# Patient Record
Sex: FEMALE | Race: WHITE | NOT HISPANIC OR LATINO | Employment: OTHER | URBAN - METROPOLITAN AREA
[De-identification: names, ages, dates, MRNs, and addresses within clinical notes are randomized per-mention and may not be internally consistent; named-entity substitution may affect disease eponyms.]

---

## 2017-01-05 ENCOUNTER — OFFICE VISIT (OUTPATIENT)
Dept: FAMILY MEDICINE | Facility: CLINIC | Age: 52
End: 2017-01-05
Payer: COMMERCIAL

## 2017-01-05 VITALS
SYSTOLIC BLOOD PRESSURE: 140 MMHG | HEART RATE: 85 BPM | WEIGHT: 219.56 LBS | DIASTOLIC BLOOD PRESSURE: 93 MMHG | HEIGHT: 66 IN | BODY MASS INDEX: 35.29 KG/M2

## 2017-01-05 DIAGNOSIS — Z85.3 HISTORY OF BREAST CANCER: ICD-10-CM

## 2017-01-05 DIAGNOSIS — M19.90 OSTEOARTHRITIS, UNSPECIFIED OSTEOARTHRITIS TYPE, UNSPECIFIED SITE: ICD-10-CM

## 2017-01-05 DIAGNOSIS — M19.90 ARTHRITIS: ICD-10-CM

## 2017-01-05 DIAGNOSIS — Z76.89 ESTABLISHING CARE WITH NEW DOCTOR, ENCOUNTER FOR: Primary | ICD-10-CM

## 2017-01-05 PROCEDURE — 99999 PR PBB SHADOW E&M-NEW PATIENT-LVL III: CPT | Mod: PBBFAC,,, | Performed by: FAMILY MEDICINE

## 2017-01-05 PROCEDURE — 99203 OFFICE O/P NEW LOW 30 MIN: CPT | Mod: S$GLB,,, | Performed by: FAMILY MEDICINE

## 2017-01-05 PROCEDURE — 1159F MED LIST DOCD IN RCRD: CPT | Mod: S$GLB,,, | Performed by: FAMILY MEDICINE

## 2017-01-05 RX ORDER — TAMOXIFEN CITRATE 20 MG/1
20 TABLET ORAL DAILY
COMMUNITY
End: 2017-08-21

## 2017-01-05 NOTE — PROGRESS NOTES
Subjective:       Patient ID: Gil Cueva is a 51 y.o. female.    Chief Complaint: Establish Care (HX of  DJD (C- 2,3, and 4 removed in 2001))    HPI     Establish Care  Pt reports to the clinic to establish care.   The pt has a medical history which includes history of breast cancer, DJD, arthritis  As far as smoking is concerned, the pt denies.   The pt attempts to maintain a healthy diet and engages in regular exercise.   Consistent seatbelt usage reported.   Pt has no symptoms of depression.   Health maintenance addressed and updated in chart.    Review of Systems   Constitutional: Negative for chills and fever.   HENT: Negative for sore throat.    Eyes: Negative for visual disturbance.   Respiratory: Negative for cough and shortness of breath.    Cardiovascular: Negative for chest pain and leg swelling.   Gastrointestinal: Negative for abdominal pain, blood in stool, constipation, diarrhea and vomiting.   Genitourinary: Negative for difficulty urinating, dysuria and hematuria.   Musculoskeletal: Negative for arthralgias.   Neurological: Negative for dizziness and weakness.       Objective:      Physical Exam   Constitutional: She appears well-developed and well-nourished. No distress.   HENT:   Head: Normocephalic and atraumatic.   Mouth/Throat: Oropharynx is clear and moist. No oropharyngeal exudate.   Eyes: EOM are normal. Pupils are equal, round, and reactive to light.   Neck: Normal range of motion. Neck supple. No thyromegaly present.   Cardiovascular: Normal rate, regular rhythm, normal heart sounds and intact distal pulses.    Pulmonary/Chest: Effort normal and breath sounds normal. No respiratory distress. She has no wheezes.   Abdominal: Soft. Bowel sounds are normal. She exhibits no distension and no mass. There is no tenderness.   Musculoskeletal: She exhibits no edema.   Lymphadenopathy:     She has no cervical adenopathy.   Neurological: She is alert.   Skin: Skin is warm. No rash noted.  No erythema.   Psychiatric: She has a normal mood and affect. Her behavior is normal.   Vitals reviewed.      Assessment:       1. Establishing care with new doctor, encounter for    2. History of breast cancer        Plan:       1. Establishing care with new doctor, encounter for  - Basic metabolic panel; Future  - Lipid panel; Future  - Microalbumin/creatinine urine ratio; Future    2. History of breast cancer  She does continue to follow with oncology for this condition.  She is in remission at this point.     3. Arthritis  Condition currently stable. No changes to medication regimen on today.       Portions of this note were created using Dragon voice recognition software. There may be voice recognition errors found in the text, and attempts were made to correct these errors prior to signature    Darien Ferraro MD    Family Medicine  1/5/2017

## 2017-01-05 NOTE — PATIENT INSTRUCTIONS
Weight Management: Getting Started  Healthy bodies come in all shapes and sizes. Not all bodies are made to be thin. For some people, a healthy weight is higher than the average weight listed on weight charts. Your healthcare provider can help you decide on a healthy weight for you.    Reasons to lose weight  Losing weight can help with some health problems, such as high blood pressure, heart disease, diabetes, sleep apnea, and arthritis. You may also feel more energy.  Set your long-term goal  Your goal doesn't even have to be a specific weight. You may decide on a fitness goal (such as being able to walk 10 miles a week), or a health goal (such as lowering your blood pressure). Choose a goal that is measurable and reasonable, so you know when you've reached it. A goal of reaching a BMI of less than 25 is not always reasonable (or possible).   Make an action plan  Habits dont change overnight. Setting your goals too high can leave you feeling discouraged if you cant reach them. Be realistic. Choose one or two small changes you can make now. Set an action plan for how you are going to make these changes. When you can stick to this plan, keep making a few more small changes. Taking small steps will help you stay on the path to success.  Track your progress  Write down your goals. Then, keep a daily record of your progress. Write down what you eat and how active you are. This record lets you look back on how much youve done. It may also help when youre feeling frustrated. Reward yourself for success. Even if you dont reach every goal, give yourself credit for what you do get done.  Get support  Encouragement from others can help make losing weight easier. Ask your family members and friends for support. They may even want to join you. Also look to your healthcare provider, registered dietitian, and  for help. Your local hospital can give you more information about nutrition, exercise, and  weight loss.  © 1830-6696 The "[employer]. [address] [place] [address], [place] [address]. All rights reserved. This information is not intended as a substitute for professional medical care. Always follow your healthcare professional's instructions.

## 2017-01-05 NOTE — MR AVS SNAPSHOT
[facility]  [address]  [place] [address]  Phone: [phone]  Fax: [phone]                  Gil Cueva   2017 3:20 PM   Office Visit    Description:  Female : 1965   Provider:  Darien Ferraro MD   Department:  [facility]           Reason for Visit     Establish Care           Diagnoses this Visit        Comments    Establishing care with new doctor, encounter for    -  Primary     History of breast cancer         Arthritis         Osteoarthritis, unspecified osteoarthritis type, unspecified site                To Do List           Future Appointments        Provider Department Dept Phone    1/10/2017 9:15 AM LAB, [facility] - Lab [phone]    1/10/2017 9:30 AM SPECIMEN, [facility] - Lab [phone]      Goals (5 Years of Data)     None      Follow-Up and Disposition     Return in about 1 year (around 2018).      [facility] On Call     [facility] On Call Nurse [facility] -  Assistance  Registered nurses in the [facility] On Call Center provide clinical advisement, health education, appointment booking, and other advisory services.  Call for this free service at [phone].             Medications           Message regarding Medications     Verify the changes and/or additions to your medication regime listed below are the same as discussed with your clinician today.  If any of these changes or additions are incorrect, please notify your healthcare provider.             Verify that the below list of medications is an accurate representation of the medications you are currently taking.  If none reported, the list may be blank. If incorrect, please contact your healthcare provider. Carry this list with you in case of emergency.           Current Medications     tamoxifen (NOLVADEX) 20 MG Tab Take 20 mg by mouth once daily.           Clinical Reference Information           Vital Signs - Last Recorded  Most  "recent update: 1/5/2017  2:59 PM by Jerri Carrizales MA    BP Pulse Ht Wt BMI    (!) 140/93 85 5' 6" (1.676 m) 99.6 kg (219 lb 9.3 oz) 35.44 kg/m2      Blood Pressure          Most Recent Value    BP  (!)  140/93      Allergies as of 1/5/2017     Penicillins    Sulfa (Sulfonamide Antibiotics)      Immunizations Administered on Date of Encounter - 1/5/2017     None      Orders Placed During Today's Visit     Future Labs/Procedures Expected by Expires    Basic metabolic panel  1/5/2017 3/6/2018    Lipid panel  1/5/2017 3/6/2018    Microalbumin/creatinine urine ratio  1/5/2017 3/6/2018      [facility] Sign-Up     Activating your [facility] account is as easy as 1-2-3!     1) Visit [email or web address], select Sign Up Now, enter this activation code and your date of birth, then select Next.  [email or web address]  Expires: 2/19/2017  3:29 PM      2) Create a username and password to use when you visit [facility] in the future and select a security question in case you lose your password and select Next.    3) Enter your e-mail address and click Sign Up!    Additional Information  If you have questions, please e-mail [email or web address] or call [phone] to talk to our [facility] staff. Remember, [facility] is NOT to be used for urgent needs. For medical emergencies, dial 911.         Instructions      Weight Management: Getting Started  Healthy bodies come in all shapes and sizes. Not all bodies are made to be thin. For some people, a healthy weight is higher than the average weight listed on weight charts. Your healthcare provider can help you decide on a healthy weight for you.    Reasons to lose weight  Losing weight can help with some health problems, such as high blood pressure, heart disease, diabetes, sleep apnea, and arthritis. You may also feel more energy.  Set your long-term goal  Your goal doesn't even have to be a specific weight. You may decide on a fitness goal (such as being able to walk 10 miles a week), or a " health goal (such as lowering your blood pressure). Choose a goal that is measurable and reasonable, so you know when you've reached it. A goal of reaching a BMI of less than 25 is not always reasonable (or possible).   Make an action plan  Habits dont change overnight. Setting your goals too high can leave you feeling discouraged if you cant reach them. Be realistic. Choose one or two small changes you can make now. Set an action plan for how you are going to make these changes. When you can stick to this plan, keep making a few more small changes. Taking small steps will help you stay on the path to success.  Track your progress  Write down your goals. Then, keep a daily record of your progress. Write down what you eat and how active you are. This record lets you look back on how much youve done. It may also help when youre feeling frustrated. Reward yourself for success. Even if you dont reach every goal, give yourself credit for what you do get done.  Get support  Encouragement from others can help make losing weight easier. Ask your family members and friends for support. They may even want to join you. Also look to your healthcare provider, registered dietitian, and  for help. Your local hospital can give you more information about nutrition, exercise, and weight loss.  © 5008-2114 [employer]. [address] [place] [address], [place] [address]. All rights reserved. This information is not intended as a substitute for professional medical care. Always follow your healthcare professional's instructions.

## 2017-01-09 DIAGNOSIS — Z11.59 NEED FOR HEPATITIS C SCREENING TEST: ICD-10-CM

## 2017-01-10 ENCOUNTER — LAB VISIT (OUTPATIENT)
Dept: LAB | Facility: HOSPITAL | Age: 52
End: 2017-01-10
Attending: FAMILY MEDICINE
Payer: COMMERCIAL

## 2017-01-10 DIAGNOSIS — Z76.89 ESTABLISHING CARE WITH NEW DOCTOR, ENCOUNTER FOR: ICD-10-CM

## 2017-01-10 LAB
ANION GAP SERPL CALC-SCNC: 7 MMOL/L
BUN SERPL-MCNC: 16 MG/DL
CALCIUM SERPL-MCNC: 9.2 MG/DL
CHLORIDE SERPL-SCNC: 102 MMOL/L
CHOLEST/HDLC SERPL: 4.4 {RATIO}
CO2 SERPL-SCNC: 28 MMOL/L
CREAT SERPL-MCNC: 0.7 MG/DL
EST. GFR  (AFRICAN AMERICAN): >60 ML/MIN/1.73 M^2
EST. GFR  (NON AFRICAN AMERICAN): >60 ML/MIN/1.73 M^2
GLUCOSE SERPL-MCNC: 100 MG/DL
HDL/CHOLESTEROL RATIO: 22.5 %
HDLC SERPL-MCNC: 231 MG/DL
HDLC SERPL-MCNC: 52 MG/DL
LDLC SERPL CALC-MCNC: 164 MG/DL
NONHDLC SERPL-MCNC: 179 MG/DL
POTASSIUM SERPL-SCNC: 4.5 MMOL/L
SODIUM SERPL-SCNC: 137 MMOL/L
TRIGL SERPL-MCNC: 75 MG/DL

## 2017-01-10 PROCEDURE — 80061 LIPID PANEL: CPT

## 2017-01-10 PROCEDURE — 80048 BASIC METABOLIC PNL TOTAL CA: CPT

## 2017-01-10 PROCEDURE — 36415 COLL VENOUS BLD VENIPUNCTURE: CPT | Mod: PO

## 2017-01-13 ENCOUNTER — TELEPHONE (OUTPATIENT)
Dept: FAMILY MEDICINE | Facility: CLINIC | Age: 52
End: 2017-01-13

## 2017-01-17 ENCOUNTER — TELEPHONE (OUTPATIENT)
Dept: FAMILY MEDICINE | Facility: CLINIC | Age: 52
End: 2017-01-17

## 2017-05-04 ENCOUNTER — TELEPHONE (OUTPATIENT)
Dept: FAMILY MEDICINE | Facility: CLINIC | Age: 52
End: 2017-05-04

## 2017-05-04 NOTE — TELEPHONE ENCOUNTER
Spoke with patient regarding her call, inform ed her that we could not send pain medication out of state and that she would have to call to schedule an appointment to be seen when she get back in town. Patient expressed understanding.

## 2017-05-04 NOTE — TELEPHONE ENCOUNTER
----- Message from Lora Usama sent at 5/4/2017  7:31 AM CDT -----  [facility] ... She [facility] .. Was taken to urgent care.. Has 2 broken ankles (1 is detached) and a elbow .. Will not prescribe pain meds in [place] ... Only ibuprofen 800 mgs .. Requesting  Pain med to get home .. She cannot stand to go to potty / very upset  .. Has a 9 hour ride home ... Will need a follow up appt on Saturday ... Call pt at  [phone] to discuss / she needs help

## 2017-08-21 ENCOUNTER — OFFICE VISIT (OUTPATIENT)
Dept: HEMATOLOGY/ONCOLOGY | Facility: CLINIC | Age: 52
End: 2017-08-21
Payer: COMMERCIAL

## 2017-08-21 VITALS
TEMPERATURE: 98 F | HEART RATE: 85 BPM | DIASTOLIC BLOOD PRESSURE: 86 MMHG | HEIGHT: 66 IN | SYSTOLIC BLOOD PRESSURE: 133 MMHG | WEIGHT: 220 LBS | RESPIRATION RATE: 18 BRPM | BODY MASS INDEX: 35.36 KG/M2

## 2017-08-21 DIAGNOSIS — C50.112 MALIGNANT NEOPLASM OF CENTRAL PORTION OF LEFT BREAST IN FEMALE, ESTROGEN RECEPTOR POSITIVE: Chronic | ICD-10-CM

## 2017-08-21 DIAGNOSIS — Z17.0 MALIGNANT NEOPLASM OF CENTRAL PORTION OF LEFT BREAST IN FEMALE, ESTROGEN RECEPTOR POSITIVE: Chronic | ICD-10-CM

## 2017-08-21 PROCEDURE — 99213 OFFICE O/P EST LOW 20 MIN: CPT | Mod: ,,, | Performed by: INTERNAL MEDICINE

## 2017-08-21 RX ORDER — HYDROCODONE BITARTRATE AND ACETAMINOPHEN 10; 325 MG/1; MG/1
TABLET ORAL
COMMUNITY
Start: 2017-06-14 | End: 2017-08-21

## 2017-08-21 RX ORDER — TERBINAFINE HYDROCHLORIDE 250 MG/1
TABLET ORAL
COMMUNITY
Start: 2017-07-30 | End: 2017-08-21

## 2017-08-21 RX ORDER — HYDROCODONE BITARTRATE AND ACETAMINOPHEN 5; 325 MG/1; MG/1
TABLET ORAL
COMMUNITY
Start: 2017-07-05 | End: 2017-08-21

## 2017-08-21 NOTE — LETTER
August 21, 2017      Darien Ferraro MD  [address] [facility]  [address]  [place] [address]           [facility]  [address] [facility]  [address]  [place] [address]  Phone: [phone]  Fax: [phone]          Patient: Gil Cueva   MR Number: [id number]   YOB: 1965   Date of Visit: 8/21/2017       Dear Dr. Darien Ferraro:    Thank you for referring Gil Cueva to me for evaluation. Attached you will find relevant portions of my assessment and plan of care.    If you have questions, please do not hesitate to call me. I look forward to following Gil Cueva along with you.    Sincerely,    Jona Pretty MD    Enclosure  CC:  No Recipients    If you would like to receive this communication electronically, please contact [email or web address] or [phone] to request more information on [employer] Link access.    For providers and/or their staff who would like to refer a patient to [facility], please contact us through our one-stop-shop provider referral line, [facility], at [phone].    If you feel you have received this communication in error or would no longer like to receive these types of communications, please e-mail [email or web address]

## 2017-08-21 NOTE — PROGRESS NOTES
"                                                         PROGRESS NOTE    Subjective:       Patient ID: Gil Cueva is a 52 y.o. female.    Dx: 8/8/2011, IDCA  Lump/ALND: 8/22/2011  ER 99%, %, Her2 neg  T1rC5B1, 8mm tumor.   Low risk oncotype:  Began Lennon 10/3/460827/2017    Chief Complaint:  Follow-up and Results (mammo and labs from jan)  f/u breast cancer.     History of Present Illness:   Gil Cueva is a 52 y.o. female who presents routing f/u for breast cancer. No new complaints.        Family and Social history reviewed and is unchanged from 9/16/2011.       ROS:  Review of Systems   Constitutional: Negative for fever.   Respiratory: Negative for shortness of breath.    Cardiovascular: Negative for chest pain and leg swelling.   Gastrointestinal: Negative for abdominal pain and blood in stool.   Genitourinary: Negative for hematuria.   Skin: Negative for rash.        No current outpatient prescriptions on file.        Objective:       Physical Examination:     /86   Pulse 85   Temp 98.2 °F (36.8 °C)   Resp 18   Ht 5' 6" (1.676 m)   Wt 99.8 kg (220 lb)   BMI 35.51 kg/m²     Physical Exam   Constitutional: She appears well-developed and well-nourished.   HENT:   Head: Normocephalic and atraumatic.   Right Ear: External ear normal.   Left Ear: External ear normal.   Mouth/Throat: Oropharynx is clear and moist.   Eyes: Conjunctivae are normal. Pupils are equal, round, and reactive to light.   Neck: No tracheal deviation present. No thyromegaly present.   Cardiovascular: Normal rate, regular rhythm and normal heart sounds.    Pulmonary/Chest: Effort normal and breath sounds normal.       Abdominal: Soft. Bowel sounds are normal. She exhibits no distension and no mass. There is no tenderness.   Musculoskeletal: She exhibits no edema.   Neurological:   Neuro intact througout   Skin: No rash noted.   Psychiatric: She has a normal mood and affect. Her behavior is normal. Judgment and " thought content normal.       Labs:   No results found for this or any previous visit (from the past 336 hour(s)).  CMP  Sodium   Date Value Ref Range Status   01/10/2017 137 136 - 145 mmol/L Final   01/10/2017 138 136 - 145 mmol/L Final     Potassium   Date Value Ref Range Status   01/10/2017 4.5 3.5 - 5.1 mmol/L Final   01/10/2017 4.6 3.5 - 5.1 mmol/L Final     Chloride   Date Value Ref Range Status   01/10/2017 102 95 - 110 mmol/L Final   01/10/2017 102 95 - 110 mmol/L Final     CO2   Date Value Ref Range Status   01/10/2017 28 23 - 29 mmol/L Final   01/10/2017 29 23 - 29 mmol/L Final     Glucose   Date Value Ref Range Status   01/10/2017 100 70 - 110 mg/dL Final   01/10/2017 99 70 - 110 mg/dL Final     BUN, Bld   Date Value Ref Range Status   01/10/2017 16 6 - 20 mg/dL Final   01/10/2017 17 6 - 20 mg/dL Final     Creatinine   Date Value Ref Range Status   01/10/2017 0.7 0.5 - 1.4 mg/dL Final   01/10/2017 0.8 0.5 - 1.4 mg/dL Final     Calcium   Date Value Ref Range Status   01/10/2017 9.2 8.7 - 10.5 mg/dL Final   01/10/2017 9.4 8.7 - 10.5 mg/dL Final     Total Protein   Date Value Ref Range Status   01/10/2017 7.5 6.0 - 8.4 g/dL Final     Albumin   Date Value Ref Range Status   01/10/2017 3.9 3.5 - 5.2 g/dL Final     Total Bilirubin   Date Value Ref Range Status   01/10/2017 0.4 0.1 - 1.0 mg/dL Final     Comment:     For infants and newborns, interpretation of results should be based  on gestational age, weight and in agreement with clinical  observations.  Premature Infant recommended reference ranges:  Up to 24 hours.............<8.0 mg/dL  Up to 48 hours............<12.0 mg/dL  3-5 days..................<15.0 mg/dL  6-29 days.................<15.0 mg/dL       Alkaline Phosphatase   Date Value Ref Range Status   01/10/2017 83 55 - 135 U/L Final     AST   Date Value Ref Range Status   01/10/2017 14 10 - 40 U/L Final     ALT   Date Value Ref Range Status   01/10/2017 17 10 - 44 U/L Final     Anion Gap   Date  Value Ref Range Status   01/10/2017 7 (L) 8 - 16 mmol/L Final   01/10/2017 7 (L) 8 - 16 mmol/L Final     eGFR if    Date Value Ref Range Status   01/10/2017 >60.0 >60 mL/min/1.73 m^2 Final   01/10/2017 >60.0 >60 mL/min/1.73 m^2 Final     eGFR if non    Date Value Ref Range Status   01/10/2017 >60.0 >60 mL/min/1.73 m^2 Final     Comment:     Calculation used to obtain the estimated glomerular filtration  rate (eGFR) is the CKD-EPI equation. Since race is unknown   in our information system, the eGFR values for   -American and Non--American patients are given   for each creatinine result.     01/10/2017 >60.0 >60 mL/min/1.73 m^2 Final     Comment:     Calculation used to obtain the estimated glomerular filtration  rate (eGFR) is the CKD-EPI equation. Since race is unknown   in our information system, the eGFR values for   -American and Non--American patients are given   for each creatinine result.       No results found for: CEA  No results found for: PSA        Assessment/Plan:     Problem List Items Addressed This Visit     Malignant neoplasm of central portion of left breast in female, estrogen receptor positive (Chronic)     Dx: 8/8/2011, IDCA  Lump/ALND: 8/22/2011  ER 99%, %, Her2 neg  [id number], 8mm tumor.   Low risk oncotype:  Began Lennon 10/3/347307/2017    Patient is doing well.  No new issues. mammo negative and she is MARIO.  Will continue q6 mo follow up.             Other Visit Diagnoses    None.         Discussion:     Return in about 6 months (around 2/21/2018).      Electronically signed by Jona Arrington

## 2017-08-21 NOTE — ASSESSMENT & PLAN NOTE
Dx: 8/8/2011, IDCA  Lump/ALND: 8/22/2011  ER 99%, %, Her2 neg  [id number], 8mm tumor.   Low risk oncotype:  Began Lennon 10/3/843760/2017    Patient is doing well.  No new issues. mammo negative and she is MARIO.  Will continue q6 mo follow up.

## 2017-12-21 DIAGNOSIS — Z12.11 COLON CANCER SCREENING: ICD-10-CM

## 2018-02-19 ENCOUNTER — OFFICE VISIT (OUTPATIENT)
Dept: HEMATOLOGY/ONCOLOGY | Facility: CLINIC | Age: 53
End: 2018-02-19
Payer: COMMERCIAL

## 2018-02-19 VITALS
SYSTOLIC BLOOD PRESSURE: 133 MMHG | HEART RATE: 102 BPM | DIASTOLIC BLOOD PRESSURE: 90 MMHG | BODY MASS INDEX: 36.64 KG/M2 | WEIGHT: 227 LBS | RESPIRATION RATE: 18 BRPM | TEMPERATURE: 98 F

## 2018-02-19 DIAGNOSIS — C50.112 MALIGNANT NEOPLASM OF CENTRAL PORTION OF LEFT BREAST IN FEMALE, ESTROGEN RECEPTOR POSITIVE: Chronic | ICD-10-CM

## 2018-02-19 DIAGNOSIS — Z17.0 MALIGNANT NEOPLASM OF CENTRAL PORTION OF LEFT BREAST IN FEMALE, ESTROGEN RECEPTOR POSITIVE: Chronic | ICD-10-CM

## 2018-02-19 PROCEDURE — 99213 OFFICE O/P EST LOW 20 MIN: CPT | Mod: ,,, | Performed by: INTERNAL MEDICINE

## 2018-02-19 RX ORDER — DICLOFENAC SODIUM 20 MG/G
SOLUTION TOPICAL
Refills: 3 | COMMUNITY
Start: 2018-01-23 | End: 2019-07-23

## 2018-02-19 RX ORDER — CYCLOBENZAPRINE HCL 5 MG
TABLET ORAL
COMMUNITY
Start: 2018-01-05 | End: 2019-01-15

## 2018-02-19 NOTE — PROGRESS NOTES
PROGRESS NOTE    Subjective:       Patient ID: Gil Cueva is a 53 y.o. female.    Dx: 8/8/2011, IDCA  Lump/ALND: 8/22/2011  ER 99%, %, Her2 neg  [id number], 8mm tumor.   Low risk oncotype:  Began Lennon 10/3/340795/2017    Chief Complaint:  Follow-up and Results (labs in epic)  f/u breast cancer.     History of Present Illness:   Gil Cueva is a 53 y.o. female who presents routing f/u for breast cancer. No new complaints.  Patient is feeling well but has some insomnia.       Family and Social history reviewed and is unchanged from 9/16/2011.       ROS:  Review of Systems   Constitutional: Negative for fever.   Respiratory: Negative for shortness of breath.    Cardiovascular: Negative for chest pain and leg swelling.   Gastrointestinal: Negative for abdominal pain and blood in stool.   Genitourinary: Negative for hematuria.   Skin: Negative for rash.          Current Outpatient Prescriptions:     cyclobenzaprine (FLEXERIL) 5 MG tablet, , Disp: , Rfl:     PENNSAID 20 mg/gram /actuation(2 %) sopm, , Disp: , Rfl: 3        Objective:       Physical Examination:     BP (!) 133/90   Pulse 102   Temp 98.4 °F (36.9 °C)   Resp 18   Wt 103 kg (227 lb)   BMI 36.64 kg/m²     Physical Exam   Constitutional: She appears well-developed and well-nourished.   HENT:   Head: Normocephalic and atraumatic.   Right Ear: External ear normal.   Left Ear: External ear normal.   Mouth/Throat: Oropharynx is clear and moist.   Eyes: Conjunctivae are normal. Pupils are equal, round, and reactive to light.   Neck: No tracheal deviation present. No thyromegaly present.   Cardiovascular: Normal rate, regular rhythm and normal heart sounds.    Pulmonary/Chest: Effort normal and breath sounds normal.       Abdominal: Soft. Bowel sounds are normal. She exhibits no distension and no mass. There is no tenderness.   Musculoskeletal: She exhibits no edema.   Neurological:    Neuro intact througout   Skin: No rash noted.   Psychiatric: She has a normal mood and affect. Her behavior is normal. Judgment and thought content normal.       Labs:   No results found for this or any previous visit (from the past 336 hour(s)).  CMP  Sodium   Date Value Ref Range Status   01/10/2017 137 136 - 145 mmol/L Final   01/10/2017 138 136 - 145 mmol/L Final     Potassium   Date Value Ref Range Status   01/10/2017 4.5 3.5 - 5.1 mmol/L Final   01/10/2017 4.6 3.5 - 5.1 mmol/L Final     Chloride   Date Value Ref Range Status   01/10/2017 102 95 - 110 mmol/L Final   01/10/2017 102 95 - 110 mmol/L Final     CO2   Date Value Ref Range Status   01/10/2017 28 23 - 29 mmol/L Final   01/10/2017 29 23 - 29 mmol/L Final     Glucose   Date Value Ref Range Status   01/10/2017 100 70 - 110 mg/dL Final   01/10/2017 99 70 - 110 mg/dL Final     BUN, Bld   Date Value Ref Range Status   01/10/2017 16 6 - 20 mg/dL Final   01/10/2017 17 6 - 20 mg/dL Final     Creatinine   Date Value Ref Range Status   01/10/2017 0.7 0.5 - 1.4 mg/dL Final   01/10/2017 0.8 0.5 - 1.4 mg/dL Final     Calcium   Date Value Ref Range Status   01/10/2017 9.2 8.7 - 10.5 mg/dL Final   01/10/2017 9.4 8.7 - 10.5 mg/dL Final     Total Protein   Date Value Ref Range Status   01/10/2017 7.5 6.0 - 8.4 g/dL Final     Albumin   Date Value Ref Range Status   01/10/2017 3.9 3.5 - 5.2 g/dL Final     Total Bilirubin   Date Value Ref Range Status   01/10/2017 0.4 0.1 - 1.0 mg/dL Final     Comment:     For infants and newborns, interpretation of results should be based  on gestational age, weight and in agreement with clinical  observations.  Premature Infant recommended reference ranges:  Up to 24 hours.............<8.0 mg/dL  Up to 48 hours............<12.0 mg/dL  3-5 days..................<15.0 mg/dL  6-29 days.................<15.0 mg/dL       Alkaline Phosphatase   Date Value Ref Range Status   01/10/2017 83 55 - 135 U/L Final     AST   Date Value Ref Range  Status   01/10/2017 14 10 - 40 U/L Final     ALT   Date Value Ref Range Status   01/10/2017 17 10 - 44 U/L Final     Anion Gap   Date Value Ref Range Status   01/10/2017 7 (L) 8 - 16 mmol/L Final   01/10/2017 7 (L) 8 - 16 mmol/L Final     eGFR if    Date Value Ref Range Status   01/10/2017 >60.0 >60 mL/min/1.73 m^2 Final   01/10/2017 >60.0 >60 mL/min/1.73 m^2 Final     eGFR if non    Date Value Ref Range Status   01/10/2017 >60.0 >60 mL/min/1.73 m^2 Final     Comment:     Calculation used to obtain the estimated glomerular filtration  rate (eGFR) is the CKD-EPI equation. Since race is unknown   in our information system, the eGFR values for   -American and Non--American patients are given   for each creatinine result.     01/10/2017 >60.0 >60 mL/min/1.73 m^2 Final     Comment:     Calculation used to obtain the estimated glomerular filtration  rate (eGFR) is the CKD-EPI equation. Since race is unknown   in our information system, the eGFR values for   -American and Non--American patients are given   for each creatinine result.       No results found for: CEA  No results found for: PSA        Assessment/Plan:     Problem List Items Addressed This Visit     Malignant neoplasm of central portion of left breast in female, estrogen receptor positive (Chronic)     Patient is doing well.  Exam is negative and mammogram is due August 2018.  Has completed five years of Lennon.  Continue to see patient every six months.          Relevant Orders    Mammo Digital Diagnostic Bilat with CAD          Discussion:     Follow-up in about 6 months (around 8/19/2018).      Electronically signed by Jona Arrington

## 2018-02-19 NOTE — ASSESSMENT & PLAN NOTE
Patient is doing well.  Exam is negative and mammogram is due August 2018.  Has completed five years of Lennon.  Continue to see patient every six months.

## 2018-02-19 NOTE — LETTER
February 19, 2018      Darien Ferraro MD  [address] [facility]  [address]  [place] [address]           [facility]  [address] [facility]  [address]  [place] [address]  Phone: [phone]  Fax: [phone]          Patient: Gil Cueva   MR Number: [id number]   YOB: 1965   Date of Visit: 2/19/2018       Dear Dr. Darien Ferraro:    Thank you for referring Gil Cueva to me for evaluation. Attached you will find relevant portions of my assessment and plan of care.    If you have questions, please do not hesitate to call me. I look forward to following Gil Cueva along with you.    Sincerely,    Jona Pretty MD    Enclosure  CC:  No Recipients    If you would like to receive this communication electronically, please contact [email or web address] or [phone] to request more information on [employer] access.    For providers and/or their staff who would like to refer a patient to [facility], please contact us through our one-stop-shop provider referral line, [facility], at [phone].    If you feel you have received this communication in error or would no longer like to receive these types of communications, please e-mail [email or web address]

## 2018-03-15 DIAGNOSIS — Z11.59 NEED FOR HEPATITIS C SCREENING TEST: ICD-10-CM

## 2018-08-15 ENCOUNTER — OFFICE VISIT (OUTPATIENT)
Dept: HEMATOLOGY/ONCOLOGY | Facility: CLINIC | Age: 53
End: 2018-08-15
Payer: COMMERCIAL

## 2018-08-15 VITALS
WEIGHT: 203.13 LBS | HEART RATE: 77 BPM | TEMPERATURE: 99 F | SYSTOLIC BLOOD PRESSURE: 118 MMHG | DIASTOLIC BLOOD PRESSURE: 81 MMHG | RESPIRATION RATE: 18 BRPM | BODY MASS INDEX: 32.78 KG/M2

## 2018-08-15 DIAGNOSIS — Z17.0 MALIGNANT NEOPLASM OF CENTRAL PORTION OF LEFT BREAST IN FEMALE, ESTROGEN RECEPTOR POSITIVE: Chronic | ICD-10-CM

## 2018-08-15 DIAGNOSIS — C50.112 MALIGNANT NEOPLASM OF CENTRAL PORTION OF LEFT BREAST IN FEMALE, ESTROGEN RECEPTOR POSITIVE: Chronic | ICD-10-CM

## 2018-08-15 PROCEDURE — 99213 OFFICE O/P EST LOW 20 MIN: CPT | Mod: ,,, | Performed by: INTERNAL MEDICINE

## 2018-08-15 NOTE — PROGRESS NOTES
PROGRESS NOTE    Subjective:       Patient ID: Gil Cueva is a 53 y.o. female.    Dx: 8/8/2011, IDCA  Lump/ALND: 8/22/2011  ER 99%, %, Her2 neg  [id number], 8mm tumor.   Low risk oncotype:  Began Lennon 10/3/518879/2017    Chief Complaint:  Follow-up and Results  f/u breast cancer.     History of Present Illness:   Gil Cueva is a 53 y.o. female who presents routing f/u for breast cancer. No new complaints. Patient dong well at this time.  Exercising and losing wt.      Family and Social history reviewed and is unchanged from 9/16/2011.       ROS:  Review of Systems   Constitutional: Negative for fever.   Respiratory: Negative for shortness of breath.    Cardiovascular: Negative for chest pain and leg swelling.   Gastrointestinal: Negative for abdominal pain and blood in stool.   Genitourinary: Negative for hematuria.   Skin: Negative for rash.          Current Outpatient Medications:     cyclobenzaprine (FLEXERIL) 5 MG tablet, , Disp: , Rfl:     PENNSAID 20 mg/gram /actuation(2 %) sopm, , Disp: , Rfl: 3        Objective:       Physical Examination:     /81   Pulse 77   Temp 98.7 °F (37.1 °C)   Resp 18   Wt 92.1 kg (203 lb 1.6 oz)   BMI 32.78 kg/m²     Physical Exam   Constitutional: She appears well-developed and well-nourished.   HENT:   Head: Normocephalic and atraumatic.   Right Ear: External ear normal.   Left Ear: External ear normal.   Mouth/Throat: Oropharynx is clear and moist.   Eyes: Conjunctivae are normal. Pupils are equal, round, and reactive to light.   Neck: No tracheal deviation present. No thyromegaly present.   Cardiovascular: Normal rate, regular rhythm and normal heart sounds.   Pulmonary/Chest: Effort normal and breath sounds normal.       Abdominal: Soft. Bowel sounds are normal. She exhibits no distension and no mass. There is no tenderness.   Musculoskeletal: She exhibits no edema.   Neurological:   Neuro  intact througout   Skin: No rash noted.   Psychiatric: She has a normal mood and affect. Her behavior is normal. Judgment and thought content normal.       Labs:   No results found for this or any previous visit (from the past 336 hour(s)).  CMP  Sodium   Date Value Ref Range Status   01/10/2017 137 136 - 145 mmol/L Final   01/10/2017 138 136 - 145 mmol/L Final     Potassium   Date Value Ref Range Status   01/10/2017 4.5 3.5 - 5.1 mmol/L Final   01/10/2017 4.6 3.5 - 5.1 mmol/L Final     Chloride   Date Value Ref Range Status   01/10/2017 102 95 - 110 mmol/L Final   01/10/2017 102 95 - 110 mmol/L Final     CO2   Date Value Ref Range Status   01/10/2017 28 23 - 29 mmol/L Final   01/10/2017 29 23 - 29 mmol/L Final     Glucose   Date Value Ref Range Status   01/10/2017 100 70 - 110 mg/dL Final   01/10/2017 99 70 - 110 mg/dL Final     BUN, Bld   Date Value Ref Range Status   01/10/2017 16 6 - 20 mg/dL Final   01/10/2017 17 6 - 20 mg/dL Final     Creatinine   Date Value Ref Range Status   01/10/2017 0.7 0.5 - 1.4 mg/dL Final   01/10/2017 0.8 0.5 - 1.4 mg/dL Final     Calcium   Date Value Ref Range Status   01/10/2017 9.2 8.7 - 10.5 mg/dL Final   01/10/2017 9.4 8.7 - 10.5 mg/dL Final     Total Protein   Date Value Ref Range Status   01/10/2017 7.5 6.0 - 8.4 g/dL Final     Albumin   Date Value Ref Range Status   01/10/2017 3.9 3.5 - 5.2 g/dL Final     Total Bilirubin   Date Value Ref Range Status   01/10/2017 0.4 0.1 - 1.0 mg/dL Final     Comment:     For infants and newborns, interpretation of results should be based  on gestational age, weight and in agreement with clinical  observations.  Premature Infant recommended reference ranges:  Up to 24 hours.............<8.0 mg/dL  Up to 48 hours............<12.0 mg/dL  3-5 days..................<15.0 mg/dL  6-29 days.................<15.0 mg/dL       Alkaline Phosphatase   Date Value Ref Range Status   01/10/2017 83 55 - 135 U/L Final     AST   Date Value Ref Range Status    01/10/2017 14 10 - 40 U/L Final     ALT   Date Value Ref Range Status   01/10/2017 17 10 - 44 U/L Final     Anion Gap   Date Value Ref Range Status   01/10/2017 7 (L) 8 - 16 mmol/L Final   01/10/2017 7 (L) 8 - 16 mmol/L Final     eGFR if    Date Value Ref Range Status   01/10/2017 >60.0 >60 mL/min/1.73 m^2 Final   01/10/2017 >60.0 >60 mL/min/1.73 m^2 Final     eGFR if non    Date Value Ref Range Status   01/10/2017 >60.0 >60 mL/min/1.73 m^2 Final     Comment:     Calculation used to obtain the estimated glomerular filtration  rate (eGFR) is the CKD-EPI equation. Since race is unknown   in our information system, the eGFR values for   -American and Non--American patients are given   for each creatinine result.     01/10/2017 >60.0 >60 mL/min/1.73 m^2 Final     Comment:     Calculation used to obtain the estimated glomerular filtration  rate (eGFR) is the CKD-EPI equation. Since race is unknown   in our information system, the eGFR values for   -American and Non--American patients are given   for each creatinine result.       No results found for: CEA  No results found for: PSA        Assessment/Plan:     Problem List Items Addressed This Visit     Malignant neoplasm of central portion of left breast in female, estrogen receptor positive (Chronic)     Patient is doing well currently.  Mammogram is negative this month.  Exam is negative and she is MARIO.  Will continue to watch every six moths and with yearly mammograms and discussed this today.                Discussion:     Follow-up in about 6 months (around 2/15/2019).      Electronically signed by Jona Arrington

## 2018-08-15 NOTE — ASSESSMENT & PLAN NOTE
Patient is doing well currently.  Mammogram is negative this month.  Exam is negative and she is MARIO.  Will continue to watch every six moths and with yearly mammograms and discussed this today.

## 2018-08-15 NOTE — LETTER
August 15, 2018      MONIKA Ambriz  [address]  [place] [address]           [facility]  [address] [facility]  [address]  [place] [address]  Phone: [phone]  Fax: [phone]          Patient: Gil Cueva   MR Number: [id number]   YOB: 1965   Date of Visit: 8/15/2018       Dear Arleen Reyes:    Thank you for referring Gil Cueva to me for evaluation. Attached you will find relevant portions of my assessment and plan of care.    If you have questions, please do not hesitate to call me. I look forward to following Gil Cueva along with you.    Sincerely,    Jona Pretty MD    Enclosure  CC:  No Recipients    If you would like to receive this communication electronically, please contact [email or web address] or [phone] to request more information on [employer] access.    For providers and/or their staff who would like to refer a patient to [facility], please contact us through our one-stop-shop provider referral line, [facility], at [phone].    If you feel you have received this communication in error or would no longer like to receive these types of communications, please e-mail [email or web address]

## 2018-08-24 ENCOUNTER — TELEPHONE (OUTPATIENT)
Dept: FAMILY MEDICINE | Facility: CLINIC | Age: 53
End: 2018-08-24

## 2018-08-24 NOTE — TELEPHONE ENCOUNTER
Called pt regarding below message. Informed pt that Arleen FRANK does not have any available appts today. Pt verbalized understanding and will report to nearest urgent care.     ----- Message from Mariely Lindsay sent at 8/24/2018  9:56 AM CDT -----  Contact: self  Patient is congested, only wants to see Eric, next available is 9/28. Please call back at [phone] (home)

## 2019-01-15 ENCOUNTER — LAB VISIT (OUTPATIENT)
Dept: LAB | Facility: HOSPITAL | Age: 54
End: 2019-01-15
Attending: FAMILY MEDICINE
Payer: COMMERCIAL

## 2019-01-15 ENCOUNTER — HOSPITAL ENCOUNTER (OUTPATIENT)
Dept: RADIOLOGY | Facility: CLINIC | Age: 54
Discharge: HOME | End: 2019-01-15
Attending: FAMILY MEDICINE
Payer: COMMERCIAL

## 2019-01-15 ENCOUNTER — OFFICE VISIT (OUTPATIENT)
Dept: FAMILY MEDICINE | Facility: CLINIC | Age: 54
End: 2019-01-15
Payer: COMMERCIAL

## 2019-01-15 VITALS
RESPIRATION RATE: 12 BRPM | SYSTOLIC BLOOD PRESSURE: 104 MMHG | BODY MASS INDEX: 29.87 KG/M2 | WEIGHT: 185.88 LBS | OXYGEN SATURATION: 95 % | TEMPERATURE: 98 F | HEART RATE: 80 BPM | DIASTOLIC BLOOD PRESSURE: 67 MMHG | HEIGHT: 66 IN

## 2019-01-15 DIAGNOSIS — Z85.3 HISTORY OF BREAST CANCER: ICD-10-CM

## 2019-01-15 DIAGNOSIS — R06.09 DOE (DYSPNEA ON EXERTION): ICD-10-CM

## 2019-01-15 DIAGNOSIS — Z00.00 ANNUAL PHYSICAL EXAM: ICD-10-CM

## 2019-01-15 DIAGNOSIS — M50.30 DDD (DEGENERATIVE DISC DISEASE), CERVICAL: ICD-10-CM

## 2019-01-15 DIAGNOSIS — Z11.59 NEED FOR HEPATITIS C SCREENING TEST: ICD-10-CM

## 2019-01-15 DIAGNOSIS — Z00.00 ANNUAL PHYSICAL EXAM: Primary | ICD-10-CM

## 2019-01-15 DIAGNOSIS — Z13.29 THYROID DISORDER SCREENING: ICD-10-CM

## 2019-01-15 DIAGNOSIS — J06.9 UPPER RESPIRATORY TRACT INFECTION, UNSPECIFIED TYPE: ICD-10-CM

## 2019-01-15 DIAGNOSIS — Z13.220 LIPID SCREENING: ICD-10-CM

## 2019-01-15 DIAGNOSIS — Z23 NEED FOR PNEUMOCOCCAL VACCINE: ICD-10-CM

## 2019-01-15 DIAGNOSIS — Z23 FLU VACCINE NEED: ICD-10-CM

## 2019-01-15 DIAGNOSIS — Z23 NEED FOR TDAP VACCINATION: ICD-10-CM

## 2019-01-15 LAB
ALBUMIN SERPL BCP-MCNC: 4.2 G/DL
ALP SERPL-CCNC: 63 U/L
ALT SERPL W/O P-5'-P-CCNC: 13 U/L
ANION GAP SERPL CALC-SCNC: 9 MMOL/L
AST SERPL-CCNC: 14 U/L
BASOPHILS # BLD AUTO: 0.04 K/UL
BASOPHILS NFR BLD: 0.9 %
BILIRUB SERPL-MCNC: 0.5 MG/DL
BUN SERPL-MCNC: 17 MG/DL
CALCIUM SERPL-MCNC: 10.1 MG/DL
CHLORIDE SERPL-SCNC: 105 MMOL/L
CHOLEST SERPL-MCNC: 221 MG/DL
CHOLEST/HDLC SERPL: 3.6 {RATIO}
CO2 SERPL-SCNC: 25 MMOL/L
CREAT SERPL-MCNC: 0.7 MG/DL
DIFFERENTIAL METHOD: ABNORMAL
EOSINOPHIL # BLD AUTO: 0 K/UL
EOSINOPHIL NFR BLD: 0.4 %
ERYTHROCYTE [DISTWIDTH] IN BLOOD BY AUTOMATED COUNT: 13.3 %
EST. GFR  (AFRICAN AMERICAN): >60 ML/MIN/1.73 M^2
EST. GFR  (NON AFRICAN AMERICAN): >60 ML/MIN/1.73 M^2
GLUCOSE SERPL-MCNC: 76 MG/DL
HCT VFR BLD AUTO: 41.4 %
HDLC SERPL-MCNC: 62 MG/DL
HDLC SERPL: 28.1 %
HGB BLD-MCNC: 12.8 G/DL
IMM GRANULOCYTES # BLD AUTO: 0.02 K/UL
IMM GRANULOCYTES NFR BLD AUTO: 0.4 %
LDLC SERPL CALC-MCNC: 149.4 MG/DL
LYMPHOCYTES # BLD AUTO: 1.8 K/UL
LYMPHOCYTES NFR BLD: 38.8 %
MCH RBC QN AUTO: 30.1 PG
MCHC RBC AUTO-ENTMCNC: 30.9 G/DL
MCV RBC AUTO: 97 FL
MONOCYTES # BLD AUTO: 0.3 K/UL
MONOCYTES NFR BLD: 7.4 %
NEUTROPHILS # BLD AUTO: 2.4 K/UL
NEUTROPHILS NFR BLD: 52.1 %
NONHDLC SERPL-MCNC: 159 MG/DL
NRBC BLD-RTO: 0 /100 WBC
PLATELET # BLD AUTO: 353 K/UL
PMV BLD AUTO: 10.8 FL
POTASSIUM SERPL-SCNC: 4.8 MMOL/L
PROT SERPL-MCNC: 7.7 G/DL
RBC # BLD AUTO: 4.25 M/UL
SODIUM SERPL-SCNC: 139 MMOL/L
TRIGL SERPL-MCNC: 48 MG/DL
TSH SERPL DL<=0.005 MIU/L-ACNC: 1.04 UIU/ML
WBC # BLD AUTO: 4.61 K/UL

## 2019-01-15 PROCEDURE — 93005 ELECTROCARDIOGRAM TRACING: CPT | Mod: S$GLB,,, | Performed by: FAMILY MEDICINE

## 2019-01-15 PROCEDURE — 71046 X-RAY EXAM CHEST 2 VIEWS: CPT | Mod: 26,,, | Performed by: RADIOLOGY

## 2019-01-15 PROCEDURE — 86803 HEPATITIS C AB TEST: CPT

## 2019-01-15 PROCEDURE — 36415 COLL VENOUS BLD VENIPUNCTURE: CPT | Mod: PO

## 2019-01-15 PROCEDURE — 71046 XR CHEST PA AND LATERAL: ICD-10-PCS | Mod: 26,,, | Performed by: RADIOLOGY

## 2019-01-15 PROCEDURE — 93005 EKG 12-LEAD: ICD-10-PCS | Mod: S$GLB,,, | Performed by: FAMILY MEDICINE

## 2019-01-15 PROCEDURE — 80061 LIPID PANEL: CPT

## 2019-01-15 PROCEDURE — 93000 ELECTROCARDIOGRAM COMPLETE: CPT | Mod: S$GLB,,, | Performed by: INTERNAL MEDICINE

## 2019-01-15 PROCEDURE — 90688 IIV4 VACCINE SPLT 0.5 ML IM: CPT | Mod: S$GLB,,, | Performed by: FAMILY MEDICINE

## 2019-01-15 PROCEDURE — 99999 PR PBB SHADOW E&M-EST. PATIENT-LVL IV: ICD-10-PCS | Mod: PBBFAC,,, | Performed by: FAMILY MEDICINE

## 2019-01-15 PROCEDURE — 90472 PNEUMOCOCCAL POLYSACCHARIDE VACCINE 23-VALENT =>2YO SQ IM: ICD-10-PCS | Mod: S$GLB,,, | Performed by: FAMILY MEDICINE

## 2019-01-15 PROCEDURE — 3008F PR BODY MASS INDEX (BMI) DOCUMENTED: ICD-10-PCS | Mod: CPTII,S$GLB,, | Performed by: FAMILY MEDICINE

## 2019-01-15 PROCEDURE — 99999 PR PBB SHADOW E&M-EST. PATIENT-LVL IV: CPT | Mod: PBBFAC,,, | Performed by: FAMILY MEDICINE

## 2019-01-15 PROCEDURE — 99214 PR OFFICE/OUTPT VISIT, EST, LEVL IV, 30-39 MIN: ICD-10-PCS | Mod: 25,S$GLB,, | Performed by: FAMILY MEDICINE

## 2019-01-15 PROCEDURE — 90688 FLU VACCINE (QUAD) GREATER THAN OR EQUAL TO 3YO W/ PRESERVATIVE: ICD-10-PCS | Mod: S$GLB,,, | Performed by: FAMILY MEDICINE

## 2019-01-15 PROCEDURE — 99214 OFFICE O/P EST MOD 30 MIN: CPT | Mod: 25,S$GLB,, | Performed by: FAMILY MEDICINE

## 2019-01-15 PROCEDURE — 90472 IMMUNIZATION ADMIN EACH ADD: CPT | Mod: S$GLB,,, | Performed by: FAMILY MEDICINE

## 2019-01-15 PROCEDURE — 90471 FLU VACCINE (QUAD) GREATER THAN OR EQUAL TO 3YO W/ PRESERVATIVE: ICD-10-PCS | Mod: S$GLB,,, | Performed by: FAMILY MEDICINE

## 2019-01-15 PROCEDURE — 90732 PPSV23 VACC 2 YRS+ SUBQ/IM: CPT | Mod: S$GLB,,, | Performed by: FAMILY MEDICINE

## 2019-01-15 PROCEDURE — 90732 PNEUMOCOCCAL POLYSACCHARIDE VACCINE 23-VALENT =>2YO SQ IM: ICD-10-PCS | Mod: S$GLB,,, | Performed by: FAMILY MEDICINE

## 2019-01-15 PROCEDURE — 84443 ASSAY THYROID STIM HORMONE: CPT

## 2019-01-15 PROCEDURE — 90471 IMMUNIZATION ADMIN: CPT | Mod: S$GLB,,, | Performed by: FAMILY MEDICINE

## 2019-01-15 PROCEDURE — 85025 COMPLETE CBC W/AUTO DIFF WBC: CPT

## 2019-01-15 PROCEDURE — 71046 X-RAY EXAM CHEST 2 VIEWS: CPT | Mod: TC,FY,PO

## 2019-01-15 PROCEDURE — 80053 COMPREHEN METABOLIC PANEL: CPT

## 2019-01-15 PROCEDURE — 3008F BODY MASS INDEX DOCD: CPT | Mod: CPTII,S$GLB,, | Performed by: FAMILY MEDICINE

## 2019-01-15 PROCEDURE — 93000 EKG 12-LEAD: ICD-10-PCS | Mod: S$GLB,,, | Performed by: INTERNAL MEDICINE

## 2019-01-15 NOTE — NURSING
2 patient identifiers used (name & ). Administered Flu vaccine R Chris IM. Patient tolerated well, no bleeding at insertion site noted. Pain scale 0/10. Aseptic technique maintained. Immunization information given to patient. Advised patient to remain in clinic for 15 minutes to monitor for reaction. No AR noted.2 patient identifiers used (name & ). Administered Pneumovax vaccine R Glt IM. Patient tolerated well, no bleeding at insertion site noted. Pain scale 0/10. Aseptic technique maintained. Immunization information given to patient.    Unable to use L side due to Lymph-edema.

## 2019-01-15 NOTE — PROGRESS NOTES
"Subjective:       Patient ID: Gil Cueva is a 53 y.o. female.    Chief Complaint: Establish Care    HPI  Review of Systems   Constitutional: Positive for activity change. Negative for unexpected weight change.   HENT: Positive for rhinorrhea. Negative for hearing loss and trouble swallowing.    Eyes: Negative for discharge and visual disturbance.   Respiratory: Negative for chest tightness and wheezing.    Cardiovascular: Positive for palpitations. Negative for chest pain.   Gastrointestinal: Positive for diarrhea. Negative for blood in stool, constipation and vomiting.   Endocrine: Negative for polydipsia and polyuria.   Genitourinary: Negative for difficulty urinating, dysuria, hematuria and menstrual problem.   Musculoskeletal: Positive for arthralgias and joint swelling. Negative for neck pain.   Neurological: Positive for headaches. Negative for weakness.   Psychiatric/Behavioral: Negative for confusion and dysphoric mood.       Patient Active Problem List   Diagnosis    History of breast cancer    Arthritis    Osteoarthritis    Malignant neoplasm of central portion of left breast in female, estrogen receptor positive    DDD (degenerative disc disease), cervical s/p fusion 2003     Patient is here to establish care  Onc Dr. Carter h/o left breast ca 2008 lumpectomy     Card Dr. Brown-has seen for palpitations -ended up related to anxiety.  High stress at  Home. 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 Under care of Dr. William.  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      C/o flu like illness starting 12/25.  Has persisted roberto roberson and lack of energy. Dizziness with exercise. Goes to the gym usually 3 days per week. No CP or fainting. No leg swelling. No fever since beginning or cough but has h/o walking pneumonia without cough  Objective:      Physical Exam   Constitutional: She is oriented to person, place, and time. She appears " well-developed and well-nourished.   HENT:   Right Ear: Tympanic membrane and ear canal normal.   Left Ear: Tympanic membrane and ear canal normal.   Nose: Mucosal edema and rhinorrhea present. Right sinus exhibits no maxillary sinus tenderness and no frontal sinus tenderness. Left sinus exhibits no maxillary sinus tenderness and no frontal sinus tenderness.   Mouth/Throat: Posterior oropharyngeal erythema present. No oropharyngeal exudate, posterior oropharyngeal edema or tonsillar abscesses.   Cardiovascular: Normal rate, regular rhythm and normal heart sounds.   Pulmonary/Chest: Effort normal and breath sounds normal.   Musculoskeletal: She exhibits no edema.   Neurological: She is alert and oriented to person, place, and time.   Skin: Skin is warm and dry.   Psychiatric: She has a normal mood and affect.   Nursing note and vitals reviewed.      Assessment:       1. Annual physical exam    2. History of breast cancer    3. Need for Tdap vaccination    4. Need for pneumococcal vaccine    5. Flu vaccine need    6. Lipid screening    7. Need for hepatitis C screening test    8. Thyroid disorder screening    9. TUTTLE (dyspnea on exertion)    10. DDD (degenerative disc disease), cervical s/p fusion 2013    11. Upper respiratory tract infection, unspecified type        Plan:         1. Annual physical exam  Discussed health maintenance guidelines appropriate for age.  Printed materials given.      - Comprehensive metabolic panel; Future  - CBC auto differential; Future    2. History of breast cancer  Cont onc surveillance    3. Need for Tdap vaccination  Defer    4. Need for pneumococcal vaccine  Immunize today.  Counseled patient on risks, benefits and side effects.  Patient elected to proceed with vaccination.    - (In Office Administered) Pneumococcal Polysaccharide Vaccine (23 Valent) (SQ/IM)    5. Flu vaccine need  Immunize today.  Counseled patient on risks, benefits and side effects.  Patient elected to proceed  with vaccination.    - Influenza - Quadrivalent (3 years & older)    6. Lipid screening  Screen and treat as indicated:    - Lipid panel; Future    7. Need for hepatitis C screening test  Screen and treat as indicated:    - Hepatitis C antibody; Future    8. Thyroid disorder screening  Screen and treat as indicated:    - TSH; Future    9. TUTTLE (dyspnea on exertion)  Work up  - X-Ray Chest PA And Lateral; Future  - IN OFFICE EKG 12-LEAD (to Muse)    10. DDD (degenerative disc disease), cervical s/p fusion 2013  Cont home exercises and mgmt    11. Upper respiratory tract infection, unspecified type  I counseled the patient on general home care guidelines for cough and congestion including increasing fluid intake, getting plenty of rest and use of OTC cough and cold medications.  I recommended guafenesin for congestion and dextromethorphan as directed for cough.  A brand like Mucinex DM is recommended.  Avoidance of decongestants is recommended for patients with heart problems and hypertension.  Extra vitamin C may also benefit.  Return to clinic if symptoms last longer than 10 days or sooner if worsen with symptoms like fever > 100.4, severe sinus pain or headache, thick yellow nasal discharge or sputum, dehydration or lethargy.        Time spent with patient: 20 minutes    Patient with be reevaluated in 6 months or sooner prn    Greater than 50% of this visit was spent counseling as described in above documentation:Yes

## 2019-01-16 ENCOUNTER — PATIENT MESSAGE (OUTPATIENT)
Dept: FAMILY MEDICINE | Facility: CLINIC | Age: 54
End: 2019-01-16

## 2019-01-16 LAB — HCV AB SERPL QL IA: NEGATIVE

## 2019-01-17 ENCOUNTER — PATIENT MESSAGE (OUTPATIENT)
Dept: FAMILY MEDICINE | Facility: CLINIC | Age: 54
End: 2019-01-17

## 2019-02-14 ENCOUNTER — OFFICE VISIT (OUTPATIENT)
Dept: ORTHOPEDICS | Facility: CLINIC | Age: 54
End: 2019-02-14
Payer: COMMERCIAL

## 2019-02-14 VITALS
DIASTOLIC BLOOD PRESSURE: 83 MMHG | WEIGHT: 186.38 LBS | SYSTOLIC BLOOD PRESSURE: 116 MMHG | HEIGHT: 66 IN | BODY MASS INDEX: 29.95 KG/M2 | HEART RATE: 69 BPM

## 2019-02-14 DIAGNOSIS — M25.571 RIGHT ANKLE PAIN, UNSPECIFIED CHRONICITY: Primary | ICD-10-CM

## 2019-02-14 PROCEDURE — 3008F PR BODY MASS INDEX (BMI) DOCUMENTED: ICD-10-PCS | Mod: CPTII,S$GLB,, | Performed by: ORTHOPAEDIC SURGERY

## 2019-02-14 PROCEDURE — 99999 PR PBB SHADOW E&M-EST. PATIENT-LVL III: CPT | Mod: PBBFAC,,, | Performed by: ORTHOPAEDIC SURGERY

## 2019-02-14 PROCEDURE — 99999 PR PBB SHADOW E&M-EST. PATIENT-LVL III: ICD-10-PCS | Mod: PBBFAC,,, | Performed by: ORTHOPAEDIC SURGERY

## 2019-02-14 PROCEDURE — 99204 OFFICE O/P NEW MOD 45 MIN: CPT | Mod: S$GLB,,, | Performed by: ORTHOPAEDIC SURGERY

## 2019-02-14 PROCEDURE — 3008F BODY MASS INDEX DOCD: CPT | Mod: CPTII,S$GLB,, | Performed by: ORTHOPAEDIC SURGERY

## 2019-02-14 PROCEDURE — 99204 PR OFFICE/OUTPT VISIT, NEW, LEVL IV, 45-59 MIN: ICD-10-PCS | Mod: S$GLB,,, | Performed by: ORTHOPAEDIC SURGERY

## 2019-02-18 ENCOUNTER — OFFICE VISIT (OUTPATIENT)
Dept: HEMATOLOGY/ONCOLOGY | Facility: CLINIC | Age: 54
End: 2019-02-18
Payer: COMMERCIAL

## 2019-02-18 VITALS
BODY MASS INDEX: 30.25 KG/M2 | HEART RATE: 80 BPM | TEMPERATURE: 98 F | DIASTOLIC BLOOD PRESSURE: 79 MMHG | WEIGHT: 187.38 LBS | SYSTOLIC BLOOD PRESSURE: 110 MMHG | RESPIRATION RATE: 20 BRPM

## 2019-02-18 DIAGNOSIS — Z17.0 MALIGNANT NEOPLASM OF CENTRAL PORTION OF LEFT BREAST IN FEMALE, ESTROGEN RECEPTOR POSITIVE: Chronic | ICD-10-CM

## 2019-02-18 DIAGNOSIS — C50.112 MALIGNANT NEOPLASM OF CENTRAL PORTION OF LEFT BREAST IN FEMALE, ESTROGEN RECEPTOR POSITIVE: Chronic | ICD-10-CM

## 2019-02-18 DIAGNOSIS — R55 SYNCOPE, UNSPECIFIED SYNCOPE TYPE: ICD-10-CM

## 2019-02-18 PROCEDURE — 99214 PR OFFICE/OUTPT VISIT, EST, LEVL IV, 30-39 MIN: ICD-10-PCS | Mod: ,,, | Performed by: INTERNAL MEDICINE

## 2019-02-18 PROCEDURE — 3008F PR BODY MASS INDEX (BMI) DOCUMENTED: ICD-10-PCS | Mod: ,,, | Performed by: INTERNAL MEDICINE

## 2019-02-18 PROCEDURE — 99214 OFFICE O/P EST MOD 30 MIN: CPT | Mod: ,,, | Performed by: INTERNAL MEDICINE

## 2019-02-18 PROCEDURE — 3008F BODY MASS INDEX DOCD: CPT | Mod: ,,, | Performed by: INTERNAL MEDICINE

## 2019-02-18 NOTE — LETTER
February 18, 2019      Erika Morfin MD  [address] [facility]  [place] [address]           [facility] - Hematology Oncology  [address] [facility]  [address]  [place] [address]  Phone: [phone]  Fax: [phone]          Patient: Gil Cueva   MR Number: [id number]   YOB: 1965   Date of Visit: 2/18/2019       Dear Dr. Erika Morfin:    Thank you for referring Gil Cueva to me for evaluation. Attached you will find relevant portions of my assessment and plan of care.    If you have questions, please do not hesitate to call me. I look forward to following Gil Cueva along with you.    Sincerely,    Jona Pretty MD    Enclosure  CC:  No Recipients    If you would like to receive this communication electronically, please contact [email or web address] or [phone] to request more information on [employer] Link access.    For providers and/or their staff who would like to refer a patient to [facility], please contact us through our one-stop-shop provider referral line, [facility], at [phone].    If you feel you have received this communication in error or would no longer like to receive these types of communications, please e-mail [email or web address]

## 2019-02-18 NOTE — ASSESSMENT & PLAN NOTE
Patient physically has nothing to explain this but is does seem to be a vaso-vagal episode.  I discussed today and would like her to see cardiology to make sure there are no issues here.  Will refer to Dr. ENRIQUE Mayo.  Patient agreeable with this plan.

## 2019-02-18 NOTE — ASSESSMENT & PLAN NOTE
Patient is doing well from this standpoint and appears MARIO at this time.  Will continue to follow patient every six months for this and discussed today.  Mammogram due in August of this year.

## 2019-02-18 NOTE — PROGRESS NOTES
PROGRESS NOTE    Subjective:       Patient ID: Gil Cueva is a 54 y.o. female.    Dx: 8/8/2011, IDCA  Lump/ALND: 8/22/2011  ER 99%, %, Her2 neg  [id number], 8mm tumor.   Low risk oncotype:  Began Lennon 10/3/910019/2017    Chief Complaint:  Follow-up and Results  f/u breast cancer.     History of Present Illness:   Gil Cueva is a 54 y.o. female who presents routing f/u for breast cancer. Patient gives an episode of fainting which happened last week.  She describes these as getting sweaty, followed by approximately 10s of being unconscious.  Felt ok afterward, no cardiac or pulmonary symptoms.        Family and Social history reviewed and is unchanged from 9/16/2011.       ROS:  Review of Systems   Constitutional: Negative for fever.   Respiratory: Negative for shortness of breath.    Cardiovascular: Negative for chest pain and leg swelling.   Gastrointestinal: Negative for abdominal pain and blood in stool.   Genitourinary: Negative for hematuria.   Skin: Negative for rash.          Current Outpatient Medications:     PENNSAID 20 mg/gram /actuation(2 %) sopm, , Disp: , Rfl: 3        Objective:       Physical Examination:     /79   Pulse 80   Temp 98.2 °F (36.8 °C)   Resp 20   Wt 85 kg (187 lb 6.4 oz)   BMI 30.25 kg/m²     Physical Exam   Constitutional: She appears well-developed and well-nourished.   HENT:   Head: Normocephalic and atraumatic.   Right Ear: External ear normal.   Left Ear: External ear normal.   Mouth/Throat: Oropharynx is clear and moist.   Eyes: Conjunctivae are normal. Pupils are equal, round, and reactive to light.   Neck: No tracheal deviation present. No thyromegaly present.   Cardiovascular: Normal rate, regular rhythm and normal heart sounds.   Pulmonary/Chest: Effort normal and breath sounds normal.       Abdominal: Soft. Bowel sounds are normal. She exhibits no distension and no mass. There is no  tenderness.   Musculoskeletal: She exhibits no edema.   Neurological:   Neuro intact througout   Skin: No rash noted.   Psychiatric: She has a normal mood and affect. Her behavior is normal. Judgment and thought content normal.       Labs:   No results found for this or any previous visit (from the past 336 hour(s)).  CMP  Sodium   Date Value Ref Range Status   01/15/2019 139 136 - 145 mmol/L Final     Potassium   Date Value Ref Range Status   01/15/2019 4.8 3.5 - 5.1 mmol/L Final     Chloride   Date Value Ref Range Status   01/15/2019 105 95 - 110 mmol/L Final     CO2   Date Value Ref Range Status   01/15/2019 25 23 - 29 mmol/L Final     Glucose   Date Value Ref Range Status   01/15/2019 76 70 - 110 mg/dL Final     BUN, Bld   Date Value Ref Range Status   01/15/2019 17 6 - 20 mg/dL Final     Creatinine   Date Value Ref Range Status   01/15/2019 0.7 0.5 - 1.4 mg/dL Final     Calcium   Date Value Ref Range Status   01/15/2019 10.1 8.7 - 10.5 mg/dL Final     Total Protein   Date Value Ref Range Status   01/15/2019 7.7 6.0 - 8.4 g/dL Final     Albumin   Date Value Ref Range Status   01/15/2019 4.2 3.5 - 5.2 g/dL Final     Total Bilirubin   Date Value Ref Range Status   01/15/2019 0.5 0.1 - 1.0 mg/dL Final     Comment:     For infants and newborns, interpretation of results should be based  on gestational age, weight and in agreement with clinical  observations.  Premature Infant recommended reference ranges:  Up to 24 hours.............<8.0 mg/dL  Up to 48 hours............<12.0 mg/dL  3-5 days..................<15.0 mg/dL  6-29 days.................<15.0 mg/dL       Alkaline Phosphatase   Date Value Ref Range Status   01/15/2019 63 55 - 135 U/L Final     AST   Date Value Ref Range Status   01/15/2019 14 10 - 40 U/L Final     ALT   Date Value Ref Range Status   01/15/2019 13 10 - 44 U/L Final     Anion Gap   Date Value Ref Range Status   01/15/2019 9 8 - 16 mmol/L Final     eGFR if    Date Value Ref  Range Status   01/15/2019 >60.0 >60 mL/min/1.73 m^2 Final     eGFR if non    Date Value Ref Range Status   01/15/2019 >60.0 >60 mL/min/1.73 m^2 Final     Comment:     Calculation used to obtain the estimated glomerular filtration  rate (eGFR) is the CKD-EPI equation.        No results found for: CEA  No results found for: PSA        Assessment/Plan:     Problem List Items Addressed This Visit     Malignant neoplasm of central portion of left breast in female, estrogen receptor positive (Chronic)     Patient is doing well from this standpoint and appears MARIO at this time.  Will continue to follow patient every six months for this and discussed today.  Mammogram due in August of this year.          Relevant Orders    CBC auto differential    Comprehensive metabolic panel    Syncope     Patient physically has nothing to explain this but is does seem to be a vaso-vagal episode.  I discussed today and would like her to see cardiology to make sure there are no issues here.  Will refer to Dr. ENRIQUE Mayo.  Patient agreeable with this plan.                Discussion:     Follow-up in about 6 months (around 8/18/2019).      Electronically signed by Jona Arrington

## 2019-02-28 NOTE — PROGRESS NOTES
"Orthopaedic Surgery History and Physical     History of present illness:   Gil Cueva is a 54 y.o. female who presents with RIGHT ankle pain s/p fall from standing height [place].  Underwent previous ORIF and has continued to have post-operative pain.  Has returned to most all ADL's.      Allergies:   Review of patient's allergies indicates:   Allergen Reactions    Penicillins     Sulfa (sulfonamide antibiotics)        Past medical history:   No past medical history on file.    Past surgical history:  Past Surgical History:   Procedure Laterality Date    ANKLE SURGERY Right     2016    BREAST LUMPECTOMY Left 2008    CERVICAL FUSION N/A 20003    c-3, 4, 5    ROTATOR CUFF REPAIR Right 2016    Dr. Perez       Social history:   Reviewed per EPIC history for tobacco or alcohol use     Medications:    Current Outpatient Medications:     PENNSAID 20 mg/gram /actuation(2 %) sopm, , Disp: , Rfl: 3    Review of systems:  Denies chest pain, palpitations, shortness of breath.   Denies excessive thirst, urination or heat or cold intolerance.   Denies nausea, vomiting, melena or hematochezia.    Denies fever, chills, night sweats, weight loss.    Denies dysuria or hematuria.   Denies history of anxiety or depression.   Denies any skin abnormalities or rash.   Denies upper or lower extremity paresthesias or lightheadedness.    Denies cough, shortness of breath or hemoptysis.     Physical Exam:   Vitals:    02/14/19 1046   BP: 116/83   Pulse: 69   Weight: 84.5 kg (186 lb 6.4 oz)   Height: 5' 6" (1.676 m)     Awake/alert/oriented x3, No acute distress, Afebrile, Vital signs stable  Normocephalic, Atraumatic  Heart is beating at normal rate  Good inspiratory effort with unlaboured breathing  Abdomen soft/nondistended/nontender    Right lower extremity  Motor intact L2-S1  Sensation intact L2-S2  2+ popliteal/dorsalis pedis/posterior tibial pulses  <2s CR refill to " digits  +scars    Assessment:   54 y.o. female with RIGHT ankle pain    Plan:   Activity as tolerated  RTC prn    Ag Negron MD  [facility]

## 2019-04-25 ENCOUNTER — TELEPHONE (OUTPATIENT)
Dept: HEMATOLOGY/ONCOLOGY | Facility: CLINIC | Age: 54
End: 2019-04-25

## 2019-04-25 DIAGNOSIS — C50.112 MALIGNANT NEOPLASM OF CENTRAL PORTION OF LEFT BREAST IN FEMALE, ESTROGEN RECEPTOR POSITIVE: Primary | ICD-10-CM

## 2019-04-25 DIAGNOSIS — Z17.0 MALIGNANT NEOPLASM OF CENTRAL PORTION OF LEFT BREAST IN FEMALE, ESTROGEN RECEPTOR POSITIVE: Primary | ICD-10-CM

## 2019-04-25 DIAGNOSIS — Z85.3 HISTORY OF BREAST CANCER: ICD-10-CM

## 2019-04-25 NOTE — TELEPHONE ENCOUNTER
----- Message from Heather Shahid sent at 4/25/2019 11:36 AM CDT -----  Need mammo orders input for patient.

## 2019-04-25 NOTE — TELEPHONE ENCOUNTER
Called to clarify that the mammo isn't due until aug 2019. Pt understands and just wanted to make sure it gets scheduled.

## 2019-05-28 DIAGNOSIS — Z17.0 MALIGNANT NEOPLASM OF CENTRAL PORTION OF LEFT BREAST IN FEMALE, ESTROGEN RECEPTOR POSITIVE: Primary | Chronic | ICD-10-CM

## 2019-05-28 DIAGNOSIS — C50.112 MALIGNANT NEOPLASM OF CENTRAL PORTION OF LEFT BREAST IN FEMALE, ESTROGEN RECEPTOR POSITIVE: Primary | Chronic | ICD-10-CM

## 2019-06-21 DIAGNOSIS — Z12.11 COLON CANCER SCREENING: ICD-10-CM

## 2019-07-09 ENCOUNTER — PATIENT OUTREACH (OUTPATIENT)
Dept: ADMINISTRATIVE | Facility: HOSPITAL | Age: 54
End: 2019-07-09

## 2019-07-23 ENCOUNTER — OFFICE VISIT (OUTPATIENT)
Dept: FAMILY MEDICINE | Facility: CLINIC | Age: 54
End: 2019-07-23
Payer: COMMERCIAL

## 2019-07-23 VITALS
DIASTOLIC BLOOD PRESSURE: 60 MMHG | RESPIRATION RATE: 14 BRPM | SYSTOLIC BLOOD PRESSURE: 100 MMHG | WEIGHT: 183 LBS | OXYGEN SATURATION: 99 % | BODY MASS INDEX: 29.41 KG/M2 | HEART RATE: 78 BPM | TEMPERATURE: 98 F | HEIGHT: 66 IN

## 2019-07-23 DIAGNOSIS — F41.9 ANXIETY: ICD-10-CM

## 2019-07-23 DIAGNOSIS — C50.112 MALIGNANT NEOPLASM OF CENTRAL PORTION OF LEFT FEMALE BREAST: Primary | ICD-10-CM

## 2019-07-23 DIAGNOSIS — E78.00 HYPERCHOLESTEREMIA: Primary | ICD-10-CM

## 2019-07-23 DIAGNOSIS — G47.00 INSOMNIA, UNSPECIFIED TYPE: ICD-10-CM

## 2019-07-23 DIAGNOSIS — Z17.0 ESTROGEN RECEPTOR POSITIVE: ICD-10-CM

## 2019-07-23 DIAGNOSIS — Z85.3 HISTORY OF BREAST CANCER: ICD-10-CM

## 2019-07-23 PROCEDURE — 99999 PR PBB SHADOW E&M-EST. PATIENT-LVL IV: CPT | Mod: PBBFAC,,, | Performed by: FAMILY MEDICINE

## 2019-07-23 PROCEDURE — 3008F PR BODY MASS INDEX (BMI) DOCUMENTED: ICD-10-PCS | Mod: CPTII,S$GLB,, | Performed by: FAMILY MEDICINE

## 2019-07-23 PROCEDURE — 3008F BODY MASS INDEX DOCD: CPT | Mod: CPTII,S$GLB,, | Performed by: FAMILY MEDICINE

## 2019-07-23 PROCEDURE — 99214 PR OFFICE/OUTPT VISIT, EST, LEVL IV, 30-39 MIN: ICD-10-PCS | Mod: S$GLB,,, | Performed by: FAMILY MEDICINE

## 2019-07-23 PROCEDURE — 99999 PR PBB SHADOW E&M-EST. PATIENT-LVL IV: ICD-10-PCS | Mod: PBBFAC,,, | Performed by: FAMILY MEDICINE

## 2019-07-23 PROCEDURE — 99214 OFFICE O/P EST MOD 30 MIN: CPT | Mod: S$GLB,,, | Performed by: FAMILY MEDICINE

## 2019-07-23 RX ORDER — AMITRIPTYLINE HYDROCHLORIDE 25 MG/1
25 TABLET, FILM COATED ORAL NIGHTLY PRN
Qty: 30 TABLET | Refills: 11 | Status: SHIPPED | OUTPATIENT
Start: 2019-07-23 | End: 2019-08-26

## 2019-07-23 RX ORDER — HYDROXYZINE HYDROCHLORIDE 25 MG/1
TABLET, FILM COATED ORAL
Qty: 30 TABLET | Refills: 5 | Status: SHIPPED | OUTPATIENT
Start: 2019-07-23 | End: 2019-08-26

## 2019-07-23 NOTE — PROGRESS NOTES
"Subjective:       Patient ID: Gil Cueva is a 54 y.o. female.    Chief Complaint: Follow-up (6mth f/u)    HPI  Review of Systems   Constitutional: Negative for activity change and unexpected weight change.   HENT: Negative for hearing loss, rhinorrhea and trouble swallowing.    Eyes: Negative for discharge and visual disturbance.   Respiratory: Negative for chest tightness and wheezing.    Cardiovascular: Negative for chest pain and palpitations.   Gastrointestinal: Negative for blood in stool, constipation, diarrhea and vomiting.   Endocrine: Negative for polydipsia and polyuria.   Genitourinary: Negative for difficulty urinating, dysuria, hematuria and menstrual problem.   Musculoskeletal: Negative for arthralgias, joint swelling and neck pain.   Neurological: Positive for headaches. Negative for weakness.   Psychiatric/Behavioral: Negative for confusion and dysphoric mood.       Patient Active Problem List   Diagnosis    History of breast cancer    Arthritis    Osteoarthritis    Malignant neoplasm of central portion of left breast in female, estrogen receptor positive    DDD (degenerative disc disease), cervical s/p fusion 2003    Syncope     Patient is here for a chronic conditions follow up.    Onc Dr. Carter h/o left breast ca 2008 lumpectomy      Card Dr. Brown-has seen for palpitations -ended up related to anxiety.  High stress at  Home. 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 Under care of Dr. William.  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  Extremely resistant to taking meds. Intolerant to several which made her worse     High chol     PAP utd- 2 months ago Dr. Alvarado    Declines vaccines  Objective:      Physical Exam   Constitutional: She is oriented to person, place, and time. She appears well-developed and well-nourished.   Cardiovascular: Normal rate, regular rhythm and normal heart sounds.   Pulmonary/Chest: Effort " normal and breath sounds normal.   Musculoskeletal: She exhibits no edema.   Neurological: She is alert and oriented to person, place, and time.   Skin: Skin is warm and dry.   Psychiatric: She has a normal mood and affect.   Nursing note and vitals reviewed.      Assessment:       1. Hypercholesteremia    2. History of breast cancer    3. Anxiety    4. Insomnia, unspecified type        Plan:       1. Hypercholesteremia  Stable condition.  Continue current medications.  Will adjust based on lab findings or if condition changes.    - CBC auto differential; Future  - Comprehensive metabolic panel; Future  - Lipid panel; Future    2. History of breast cancer  Cont surveillance    3. Anxiety  Use prn  - hydrOXYzine HCl (ATARAX) 25 MG tablet; 1-2 po qhs prn insomnia/anxiety attacks  Dispense: 30 tablet; Refill: 5  Add  - amitriptyline (ELAVIL) 25 MG tablet; Take 1 tablet (25 mg total) by mouth nightly as needed for Insomnia.  Dispense: 30 tablet; Refill: 11  Recommend counseling    4. Insomnia, unspecified type  Use prn  - hydrOXYzine HCl (ATARAX) 25 MG tablet; 1-2 po qhs prn insomnia/anxiety attacks  Dispense: 30 tablet; Refill: 5  - amitriptyline (ELAVIL) 25 MG tablet; Take 1 tablet (25 mg total) by mouth nightly as needed for Insomnia.  Dispense: 30 tablet; Refill: 11        Time spent with patient: 20 minutes    Patient with be reevaluated in 6 months or sooner prn    Greater than 50% of this visit was spent counseling as described in above documentation:Yes

## 2019-07-24 ENCOUNTER — PATIENT MESSAGE (OUTPATIENT)
Dept: FAMILY MEDICINE | Facility: CLINIC | Age: 54
End: 2019-07-24

## 2019-08-20 ENCOUNTER — HOSPITAL ENCOUNTER (OUTPATIENT)
Dept: RADIOLOGY | Facility: HOSPITAL | Age: 54
Discharge: HOME | End: 2019-08-20
Attending: INTERNAL MEDICINE
Payer: COMMERCIAL

## 2019-08-20 VITALS — BODY MASS INDEX: 29.25 KG/M2 | WEIGHT: 182 LBS | HEIGHT: 66 IN

## 2019-08-20 DIAGNOSIS — C50.112 MALIGNANT NEOPLASM OF CENTRAL PORTION OF LEFT FEMALE BREAST: ICD-10-CM

## 2019-08-20 DIAGNOSIS — Z17.0 ESTROGEN RECEPTOR POSITIVE: ICD-10-CM

## 2019-08-20 PROCEDURE — 77066 DX MAMMO INCL CAD BI: CPT | Mod: TC,PO

## 2019-08-22 ENCOUNTER — TELEPHONE (OUTPATIENT)
Dept: HEMATOLOGY/ONCOLOGY | Facility: CLINIC | Age: 54
End: 2019-08-22

## 2019-08-22 NOTE — TELEPHONE ENCOUNTER
Called to see if the pt had any labs done prior to f/u appt.  Patient states she was not given blood work orders. Marisel has been given the call.

## 2019-08-26 ENCOUNTER — OFFICE VISIT (OUTPATIENT)
Dept: HEMATOLOGY/ONCOLOGY | Facility: CLINIC | Age: 54
End: 2019-08-26
Payer: COMMERCIAL

## 2019-08-26 VITALS
RESPIRATION RATE: 20 BRPM | HEART RATE: 73 BPM | TEMPERATURE: 99 F | DIASTOLIC BLOOD PRESSURE: 82 MMHG | BODY MASS INDEX: 29.65 KG/M2 | WEIGHT: 183.69 LBS | SYSTOLIC BLOOD PRESSURE: 130 MMHG

## 2019-08-26 DIAGNOSIS — Z17.0 MALIGNANT NEOPLASM OF CENTRAL PORTION OF LEFT BREAST IN FEMALE, ESTROGEN RECEPTOR POSITIVE: Chronic | ICD-10-CM

## 2019-08-26 DIAGNOSIS — R55 SYNCOPE, UNSPECIFIED SYNCOPE TYPE: ICD-10-CM

## 2019-08-26 DIAGNOSIS — C50.112 MALIGNANT NEOPLASM OF CENTRAL PORTION OF LEFT BREAST IN FEMALE, ESTROGEN RECEPTOR POSITIVE: Chronic | ICD-10-CM

## 2019-08-26 PROCEDURE — 3008F PR BODY MASS INDEX (BMI) DOCUMENTED: ICD-10-PCS | Mod: S$GLB,,, | Performed by: INTERNAL MEDICINE

## 2019-08-26 PROCEDURE — 99214 OFFICE O/P EST MOD 30 MIN: CPT | Mod: S$GLB,,, | Performed by: INTERNAL MEDICINE

## 2019-08-26 PROCEDURE — 99214 PR OFFICE/OUTPT VISIT, EST, LEVL IV, 30-39 MIN: ICD-10-PCS | Mod: S$GLB,,, | Performed by: INTERNAL MEDICINE

## 2019-08-26 PROCEDURE — 3008F BODY MASS INDEX DOCD: CPT | Mod: S$GLB,,, | Performed by: INTERNAL MEDICINE

## 2019-08-26 NOTE — PROGRESS NOTES
PROGRESS NOTE    Subjective:       Patient ID: Gil Cueva is a 54 y.o. female.    Dx: 8/8/2011, IDCA  Lump/ALND: 8/22/2011  ER 99%, %, Her2 neg  [id number], 8mm tumor.   Low risk oncotype:  Began Lennon 10/3/906007/2017    Chief Complaint:  No chief complaint on file.  f/u breast cancer.     History of Present Illness:   Gil Cueva is a 54 y.o. female who presents routine f/u for breast cancer.   Had syncope c/o last visit, she saw Dr. ENRIQUE Mayo and had work up.  Heart monitor etc.  She states her work up was negative.      Mammo neg 8/20/2019.   No Labs    Family and Social history reviewed and is unchanged from 9/16/2011.       ROS:  Review of Systems   Constitutional: Negative for fever.   Respiratory: Negative for shortness of breath.    Cardiovascular: Negative for chest pain and leg swelling.   Gastrointestinal: Negative for abdominal pain and blood in stool.   Genitourinary: Negative for hematuria.   Skin: Negative for rash.        No current outpatient medications on file.        Objective:       Physical Examination:     /82   Pulse 73   Temp 98.7 °F (37.1 °C) (Oral)   Resp 20   Wt 83.3 kg (183 lb 11.2 oz)   BMI 29.65 kg/m²     Physical Exam   Constitutional: She appears well-developed and well-nourished.   HENT:   Head: Normocephalic and atraumatic.   Right Ear: External ear normal.   Left Ear: External ear normal.   Mouth/Throat: Oropharynx is clear and moist.   Eyes: Pupils are equal, round, and reactive to light. Conjunctivae are normal.   Neck: No tracheal deviation present. No thyromegaly present.   Cardiovascular: Normal rate, regular rhythm and normal heart sounds.   Pulmonary/Chest: Effort normal and breath sounds normal.       Abdominal: Soft. Bowel sounds are normal. She exhibits no distension and no mass. There is no tenderness.   Musculoskeletal: She exhibits no edema.   Neurological:   Neuro intact througout    Skin: No rash noted.   Psychiatric: She has a normal mood and affect. Her behavior is normal. Judgment and thought content normal.       Labs:   No results found for this or any previous visit (from the past 336 hour(s)).  CMP  Sodium   Date Value Ref Range Status   01/15/2019 139 136 - 145 mmol/L Final     Potassium   Date Value Ref Range Status   01/15/2019 4.8 3.5 - 5.1 mmol/L Final     Chloride   Date Value Ref Range Status   01/15/2019 105 95 - 110 mmol/L Final     CO2   Date Value Ref Range Status   01/15/2019 25 23 - 29 mmol/L Final     Glucose   Date Value Ref Range Status   01/15/2019 76 70 - 110 mg/dL Final     BUN, Bld   Date Value Ref Range Status   01/15/2019 17 6 - 20 mg/dL Final     Creatinine   Date Value Ref Range Status   01/15/2019 0.7 0.5 - 1.4 mg/dL Final     Calcium   Date Value Ref Range Status   01/15/2019 10.1 8.7 - 10.5 mg/dL Final     Total Protein   Date Value Ref Range Status   01/15/2019 7.7 6.0 - 8.4 g/dL Final     Albumin   Date Value Ref Range Status   01/15/2019 4.2 3.5 - 5.2 g/dL Final     Total Bilirubin   Date Value Ref Range Status   01/15/2019 0.5 0.1 - 1.0 mg/dL Final     Comment:     For infants and newborns, interpretation of results should be based  on gestational age, weight and in agreement with clinical  observations.  Premature Infant recommended reference ranges:  Up to 24 hours.............<8.0 mg/dL  Up to 48 hours............<12.0 mg/dL  3-5 days..................<15.0 mg/dL  6-29 days.................<15.0 mg/dL       Alkaline Phosphatase   Date Value Ref Range Status   01/15/2019 63 55 - 135 U/L Final     AST   Date Value Ref Range Status   01/15/2019 14 10 - 40 U/L Final     ALT   Date Value Ref Range Status   01/15/2019 13 10 - 44 U/L Final     Anion Gap   Date Value Ref Range Status   01/15/2019 9 8 - 16 mmol/L Final     eGFR if    Date Value Ref Range Status   01/15/2019 >60.0 >60 mL/min/1.73 m^2 Final     eGFR if non    Date  Value Ref Range Status   01/15/2019 >60.0 >60 mL/min/1.73 m^2 Final     Comment:     Calculation used to obtain the estimated glomerular filtration  rate (eGFR) is the CKD-EPI equation.        No results found for: CEA  No results found for: PSA        Assessment/Plan:     Problem List Items Addressed This Visit     Malignant neoplasm of central portion of left breast in female, estrogen receptor positive (Chronic)     Patient is doing well.  She appears MARIO at this time.  Will send for labs and will continue to see her on a six month basis.           Relevant Orders    CBC auto differential    Comprehensive metabolic panel    Syncope     Patient had cardiology work up since last visit and this was reported negative.  Will get sent by Dr. ENRIQUE Mayo.  No further episodes of this.                 Discussion:     Follow up in about 6 months (around 2/26/2020).      Electronically signed by Jona Arrington

## 2019-08-26 NOTE — ASSESSMENT & PLAN NOTE
Patient had cardiology work up since last visit and this was reported negative.  Will get sent by Dr. ENRIQUE Mayo.  No further episodes of this.

## 2019-08-26 NOTE — ASSESSMENT & PLAN NOTE
Patient is doing well.  She appears MARIO at this time.  Will send for labs and will continue to see her on a six month basis.

## 2019-08-28 ENCOUNTER — PATIENT OUTREACH (OUTPATIENT)
Dept: ADMINISTRATIVE | Facility: HOSPITAL | Age: 54
End: 2019-08-28

## 2019-09-05 ENCOUNTER — PATIENT OUTREACH (OUTPATIENT)
Dept: ADMINISTRATIVE | Facility: HOSPITAL | Age: 54
End: 2019-09-05

## 2019-09-09 ENCOUNTER — PATIENT OUTREACH (OUTPATIENT)
Dept: ADMINISTRATIVE | Facility: HOSPITAL | Age: 54
End: 2019-09-09

## 2019-09-09 NOTE — PROGRESS NOTES
Requested pap smear report on August 27, 2019 from [facility]

## 2019-09-23 ENCOUNTER — TELEPHONE (OUTPATIENT)
Dept: FAMILY MEDICINE | Facility: CLINIC | Age: 54
End: 2019-09-23

## 2019-09-23 ENCOUNTER — LAB VISIT (OUTPATIENT)
Dept: LAB | Facility: HOSPITAL | Age: 54
End: 2019-09-23
Attending: FAMILY MEDICINE
Payer: COMMERCIAL

## 2019-09-23 DIAGNOSIS — C50.112 MALIGNANT NEOPLASM OF CENTRAL PORTION OF LEFT BREAST IN FEMALE, ESTROGEN RECEPTOR POSITIVE: Chronic | ICD-10-CM

## 2019-09-23 DIAGNOSIS — E78.00 HYPERCHOLESTEREMIA: ICD-10-CM

## 2019-09-23 DIAGNOSIS — Z17.0 MALIGNANT NEOPLASM OF CENTRAL PORTION OF LEFT BREAST IN FEMALE, ESTROGEN RECEPTOR POSITIVE: Chronic | ICD-10-CM

## 2019-09-23 LAB
ALBUMIN SERPL BCP-MCNC: 4.2 G/DL (ref 3.5–5.2)
ALBUMIN SERPL BCP-MCNC: 4.2 G/DL (ref 3.5–5.2)
ALP SERPL-CCNC: 53 U/L (ref 55–135)
ALP SERPL-CCNC: 53 U/L (ref 55–135)
ALT SERPL W/O P-5'-P-CCNC: 10 U/L (ref 10–44)
ALT SERPL W/O P-5'-P-CCNC: 10 U/L (ref 10–44)
ANION GAP SERPL CALC-SCNC: 9 MMOL/L (ref 8–16)
ANION GAP SERPL CALC-SCNC: 9 MMOL/L (ref 8–16)
AST SERPL-CCNC: 14 U/L (ref 10–40)
AST SERPL-CCNC: 14 U/L (ref 10–40)
BASOPHILS # BLD AUTO: 0.03 K/UL (ref 0–0.2)
BASOPHILS # BLD AUTO: 0.03 K/UL (ref 0–0.2)
BASOPHILS NFR BLD: 0.7 % (ref 0–1.9)
BASOPHILS NFR BLD: 0.7 % (ref 0–1.9)
BILIRUB SERPL-MCNC: 0.4 MG/DL (ref 0.1–1)
BILIRUB SERPL-MCNC: 0.4 MG/DL (ref 0.1–1)
BUN SERPL-MCNC: 18 MG/DL (ref 6–20)
BUN SERPL-MCNC: 18 MG/DL (ref 6–20)
CALCIUM SERPL-MCNC: 9.5 MG/DL (ref 8.7–10.5)
CALCIUM SERPL-MCNC: 9.5 MG/DL (ref 8.7–10.5)
CHLORIDE SERPL-SCNC: 105 MMOL/L (ref 95–110)
CHLORIDE SERPL-SCNC: 105 MMOL/L (ref 95–110)
CHOLEST SERPL-MCNC: 212 MG/DL (ref 120–199)
CHOLEST/HDLC SERPL: 3.4 {RATIO} (ref 2–5)
CO2 SERPL-SCNC: 25 MMOL/L (ref 23–29)
CO2 SERPL-SCNC: 25 MMOL/L (ref 23–29)
CREAT SERPL-MCNC: 0.8 MG/DL (ref 0.5–1.4)
CREAT SERPL-MCNC: 0.8 MG/DL (ref 0.5–1.4)
DIFFERENTIAL METHOD: NORMAL
DIFFERENTIAL METHOD: NORMAL
EOSINOPHIL # BLD AUTO: 0 K/UL (ref 0–0.5)
EOSINOPHIL # BLD AUTO: 0 K/UL (ref 0–0.5)
EOSINOPHIL NFR BLD: 0.7 % (ref 0–8)
EOSINOPHIL NFR BLD: 0.7 % (ref 0–8)
ERYTHROCYTE [DISTWIDTH] IN BLOOD BY AUTOMATED COUNT: 12.9 % (ref 11.5–14.5)
ERYTHROCYTE [DISTWIDTH] IN BLOOD BY AUTOMATED COUNT: 12.9 % (ref 11.5–14.5)
EST. GFR  (AFRICAN AMERICAN): >60 ML/MIN/1.73 M^2
EST. GFR  (AFRICAN AMERICAN): >60 ML/MIN/1.73 M^2
EST. GFR  (NON AFRICAN AMERICAN): >60 ML/MIN/1.73 M^2
EST. GFR  (NON AFRICAN AMERICAN): >60 ML/MIN/1.73 M^2
GLUCOSE SERPL-MCNC: 89 MG/DL (ref 70–110)
GLUCOSE SERPL-MCNC: 89 MG/DL (ref 70–110)
HCT VFR BLD AUTO: 38.4 % (ref 37–48.5)
HCT VFR BLD AUTO: 38.4 % (ref 37–48.5)
HDLC SERPL-MCNC: 63 MG/DL (ref 40–75)
HDLC SERPL: 29.7 % (ref 20–50)
HGB BLD-MCNC: 12.4 G/DL (ref 12–16)
HGB BLD-MCNC: 12.4 G/DL (ref 12–16)
IMM GRANULOCYTES # BLD AUTO: 0.02 K/UL (ref 0–0.04)
IMM GRANULOCYTES # BLD AUTO: 0.02 K/UL (ref 0–0.04)
IMM GRANULOCYTES NFR BLD AUTO: 0.4 % (ref 0–0.5)
IMM GRANULOCYTES NFR BLD AUTO: 0.4 % (ref 0–0.5)
LDLC SERPL CALC-MCNC: 141.2 MG/DL (ref 63–159)
LYMPHOCYTES # BLD AUTO: 1.8 K/UL (ref 1–4.8)
LYMPHOCYTES # BLD AUTO: 1.8 K/UL (ref 1–4.8)
LYMPHOCYTES NFR BLD: 40.3 % (ref 18–48)
LYMPHOCYTES NFR BLD: 40.3 % (ref 18–48)
MCH RBC QN AUTO: 30.9 PG (ref 27–31)
MCH RBC QN AUTO: 30.9 PG (ref 27–31)
MCHC RBC AUTO-ENTMCNC: 32.3 G/DL (ref 32–36)
MCHC RBC AUTO-ENTMCNC: 32.3 G/DL (ref 32–36)
MCV RBC AUTO: 96 FL (ref 82–98)
MCV RBC AUTO: 96 FL (ref 82–98)
MONOCYTES # BLD AUTO: 0.4 K/UL (ref 0.3–1)
MONOCYTES # BLD AUTO: 0.4 K/UL (ref 0.3–1)
MONOCYTES NFR BLD: 9.2 % (ref 4–15)
MONOCYTES NFR BLD: 9.2 % (ref 4–15)
NEUTROPHILS # BLD AUTO: 2.2 K/UL (ref 1.8–7.7)
NEUTROPHILS # BLD AUTO: 2.2 K/UL (ref 1.8–7.7)
NEUTROPHILS NFR BLD: 48.7 % (ref 38–73)
NEUTROPHILS NFR BLD: 48.7 % (ref 38–73)
NONHDLC SERPL-MCNC: 149 MG/DL
NRBC BLD-RTO: 0 /100 WBC
NRBC BLD-RTO: 0 /100 WBC
PLATELET # BLD AUTO: 326 K/UL (ref 150–350)
PLATELET # BLD AUTO: 326 K/UL (ref 150–350)
PMV BLD AUTO: 10.6 FL (ref 9.2–12.9)
PMV BLD AUTO: 10.6 FL (ref 9.2–12.9)
POTASSIUM SERPL-SCNC: 4.9 MMOL/L (ref 3.5–5.1)
POTASSIUM SERPL-SCNC: 4.9 MMOL/L (ref 3.5–5.1)
PROT SERPL-MCNC: 7.4 G/DL (ref 6–8.4)
PROT SERPL-MCNC: 7.4 G/DL (ref 6–8.4)
RBC # BLD AUTO: 4.01 M/UL (ref 4–5.4)
RBC # BLD AUTO: 4.01 M/UL (ref 4–5.4)
SODIUM SERPL-SCNC: 139 MMOL/L (ref 136–145)
SODIUM SERPL-SCNC: 139 MMOL/L (ref 136–145)
TRIGL SERPL-MCNC: 39 MG/DL (ref 30–150)
WBC # BLD AUTO: 4.47 K/UL (ref 3.9–12.7)
WBC # BLD AUTO: 4.47 K/UL (ref 3.9–12.7)

## 2019-09-23 PROCEDURE — 36415 COLL VENOUS BLD VENIPUNCTURE: CPT | Mod: PO

## 2019-09-23 PROCEDURE — 85025 COMPLETE CBC W/AUTO DIFF WBC: CPT

## 2019-09-23 PROCEDURE — 80053 COMPREHEN METABOLIC PANEL: CPT

## 2019-09-23 PROCEDURE — 80061 LIPID PANEL: CPT

## 2019-09-23 NOTE — TELEPHONE ENCOUNTER
----- Message from Meghan Castillo sent at 9/23/2019  9:34 AM CDT -----  Patient said she gave a copy of her lab orders to Dr. Morfin and need the results sent to Yoel Mayo.

## 2019-09-24 ENCOUNTER — TELEPHONE (OUTPATIENT)
Dept: FAMILY MEDICINE | Facility: CLINIC | Age: 54
End: 2019-09-24

## 2019-09-24 ENCOUNTER — PATIENT MESSAGE (OUTPATIENT)
Dept: FAMILY MEDICINE | Facility: CLINIC | Age: 54
End: 2019-09-24

## 2019-09-24 NOTE — TELEPHONE ENCOUNTER
----- Message from Arleen Cheatham LPN sent at 9/23/2019  1:43 PM CDT -----  Regarding: fax lab results  Fax lab results to Dr. ENRIQUE Mayo.

## 2020-02-13 ENCOUNTER — OFFICE VISIT (OUTPATIENT)
Dept: FAMILY MEDICINE | Facility: CLINIC | Age: 55
End: 2020-02-13
Payer: COMMERCIAL

## 2020-02-13 VITALS
RESPIRATION RATE: 12 BRPM | OXYGEN SATURATION: 99 % | BODY MASS INDEX: 28.63 KG/M2 | DIASTOLIC BLOOD PRESSURE: 60 MMHG | WEIGHT: 178.13 LBS | SYSTOLIC BLOOD PRESSURE: 120 MMHG | HEART RATE: 95 BPM | HEIGHT: 66 IN | TEMPERATURE: 98 F

## 2020-02-13 DIAGNOSIS — F41.1 GAD (GENERALIZED ANXIETY DISORDER): ICD-10-CM

## 2020-02-13 DIAGNOSIS — Z85.3 HISTORY OF BREAST CANCER: ICD-10-CM

## 2020-02-13 DIAGNOSIS — E78.00 HYPERCHOLESTEREMIA: ICD-10-CM

## 2020-02-13 DIAGNOSIS — G47.00 INSOMNIA, UNSPECIFIED TYPE: Primary | ICD-10-CM

## 2020-02-13 PROCEDURE — 99213 OFFICE O/P EST LOW 20 MIN: CPT | Mod: S$GLB,,, | Performed by: FAMILY MEDICINE

## 2020-02-13 PROCEDURE — 99999 PR PBB SHADOW E&M-EST. PATIENT-LVL IV: CPT | Mod: PBBFAC,,, | Performed by: FAMILY MEDICINE

## 2020-02-13 PROCEDURE — 99999 PR PBB SHADOW E&M-EST. PATIENT-LVL IV: ICD-10-PCS | Mod: PBBFAC,,, | Performed by: FAMILY MEDICINE

## 2020-02-13 PROCEDURE — 3008F BODY MASS INDEX DOCD: CPT | Mod: CPTII,S$GLB,, | Performed by: FAMILY MEDICINE

## 2020-02-13 PROCEDURE — 99213 PR OFFICE/OUTPT VISIT, EST, LEVL III, 20-29 MIN: ICD-10-PCS | Mod: S$GLB,,, | Performed by: FAMILY MEDICINE

## 2020-02-13 PROCEDURE — 3008F PR BODY MASS INDEX (BMI) DOCUMENTED: ICD-10-PCS | Mod: CPTII,S$GLB,, | Performed by: FAMILY MEDICINE

## 2020-02-13 RX ORDER — ALPRAZOLAM 0.5 MG/1
0.5 TABLET ORAL 3 TIMES DAILY PRN
Qty: 90 TABLET | Refills: 0 | Status: SHIPPED | OUTPATIENT
Start: 2020-02-13 | End: 2020-04-24 | Stop reason: SDUPTHER

## 2020-02-13 NOTE — PROGRESS NOTES
"Subjective:       Patient ID: Gil Cueva is a 55 y.o. female.    Chief Complaint: Follow-up (6mth f/u )    HPI  Review of Systems   Constitutional: Negative for activity change.   Eyes: Negative for discharge.   Respiratory: Negative for wheezing.    Cardiovascular: Negative for chest pain and palpitations.   Gastrointestinal: Negative for constipation, diarrhea and vomiting.   Genitourinary: Negative for difficulty urinating and hematuria.   Neurological: Negative for headaches.   Psychiatric/Behavioral: Negative for dysphoric mood.       Patient Active Problem List   Diagnosis    History of breast cancer    Arthritis    Osteoarthritis    Malignant neoplasm of central portion of left breast in female, estrogen receptor positive    DDD (degenerative disc disease), cervical s/p fusion 2003    Syncope     Patient is here for a chronic conditions follow up.    Onc Dr. Carter h/o left breast ca 2008 lumpectomy      Card Dr. Brown-has seen for palpitations -ended up related to anxiety.  High stress at  Home. 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 Under care of Dr. William.  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  Extremely resistant to taking meds. Intolerant to several which made her worse     High chol      PAP utd- 5/2019 ago Dr. Alvarado     Declines vaccines  Objective:      Physical Exam   Constitutional: She is oriented to person, place, and time. She appears well-developed and well-nourished.   Cardiovascular: Normal rate, regular rhythm and normal heart sounds.   Pulmonary/Chest: Effort normal and breath sounds normal.   Musculoskeletal: She exhibits no edema.   Neurological: She is alert and oriented to person, place, and time.   Skin: Skin is warm and dry.   Psychiatric: She has a normal mood and affect.   Nursing note and vitals reviewed.      Assessment:       1. Insomnia, unspecified type    2. Hypercholesteremia    3. ADIA " (generalized anxiety disorder)    4. History of breast cancer        Plan:       1. Hypercholesteremia  I recommend a heart healthy diet rich in fiber, fresh vegetables and fruit and low in saturated fats (fried foods, red meat, etc.).  I also recommend regular exercise including a minimum of 150 minutes of cardio exercise per week and 2-30 minute workouts of strength training like light weights, yoga, pilates, etc.  You should work toward a body mass index of < 25.        - CBC auto differential; Future  - Comprehensive metabolic panel; Future  - Lipid panel; Future    2. Insomnia, unspecified type  Uncontrolled.  USe prn  - ALPRAZolam (XANAX) 0.5 MG tablet; Take 1 tablet (0.5 mg total) by mouth 3 (three) times daily as needed for Anxiety.  Dispense: 90 tablet; Refill: 0    3. ADIA (generalized anxiety disorder)  Use prn  - ALPRAZolam (XANAX) 0.5 MG tablet; Take 1 tablet (0.5 mg total) by mouth 3 (three) times daily as needed for Anxiety.  Dispense: 90 tablet; Refill: 0    4. History of breast cancer  Cont onc surveillance'      Time spent with patient: 20 minutes    Patient with be reevaluated in 6 months or sooner prn    Greater than 50% of this visit was spent counseling as described in above documentation:Yes

## 2020-02-17 ENCOUNTER — OFFICE VISIT (OUTPATIENT)
Dept: HEMATOLOGY/ONCOLOGY | Facility: CLINIC | Age: 55
End: 2020-02-17
Payer: COMMERCIAL

## 2020-02-17 VITALS
WEIGHT: 179.81 LBS | SYSTOLIC BLOOD PRESSURE: 139 MMHG | BODY MASS INDEX: 29.02 KG/M2 | RESPIRATION RATE: 19 BRPM | HEART RATE: 76 BPM | TEMPERATURE: 99 F | DIASTOLIC BLOOD PRESSURE: 90 MMHG

## 2020-02-17 DIAGNOSIS — C50.112 MALIGNANT NEOPLASM OF CENTRAL PORTION OF LEFT BREAST IN FEMALE, ESTROGEN RECEPTOR POSITIVE: Chronic | ICD-10-CM

## 2020-02-17 DIAGNOSIS — Z17.0 MALIGNANT NEOPLASM OF CENTRAL PORTION OF LEFT BREAST IN FEMALE, ESTROGEN RECEPTOR POSITIVE: Chronic | ICD-10-CM

## 2020-02-17 PROCEDURE — 96372 THER/PROPH/DIAG INJ SC/IM: CPT | Mod: S$GLB,,, | Performed by: INTERNAL MEDICINE

## 2020-02-17 PROCEDURE — 3008F PR BODY MASS INDEX (BMI) DOCUMENTED: ICD-10-PCS | Mod: S$GLB,,, | Performed by: INTERNAL MEDICINE

## 2020-02-17 PROCEDURE — 99213 PR OFFICE/OUTPT VISIT, EST, LEVL III, 20-29 MIN: ICD-10-PCS | Mod: 25,S$GLB,, | Performed by: INTERNAL MEDICINE

## 2020-02-17 PROCEDURE — 3008F BODY MASS INDEX DOCD: CPT | Mod: S$GLB,,, | Performed by: INTERNAL MEDICINE

## 2020-02-17 PROCEDURE — 96372 PR INJECTION,THERAP/PROPH/DIAG2ST, IM OR SUBCUT: ICD-10-PCS | Mod: S$GLB,,, | Performed by: INTERNAL MEDICINE

## 2020-02-17 PROCEDURE — 99213 OFFICE O/P EST LOW 20 MIN: CPT | Mod: 25,S$GLB,, | Performed by: INTERNAL MEDICINE

## 2020-02-17 RX ORDER — CYANOCOBALAMIN 1000 UG/ML
1000 INJECTION, SOLUTION INTRAMUSCULAR; SUBCUTANEOUS
Status: SHIPPED | OUTPATIENT
Start: 2020-02-17

## 2020-02-17 RX ADMIN — CYANOCOBALAMIN 1000 MCG: 1000 INJECTION, SOLUTION INTRAMUSCULAR; SUBCUTANEOUS at 10:02

## 2020-02-17 NOTE — ASSESSMENT & PLAN NOTE
Patient is doing well from this standpoint and appears MARIO.  Will continue to see her on a six month basis and will get mammogram in August of 2020.

## 2020-02-17 NOTE — PROGRESS NOTES
PROGRESS NOTE    Subjective:       Patient ID: Gil Cueva is a 55 y.o. female.    Dx: 8/8/2011, IDCA  Lump/ALND: 8/22/2011  ER 99%, %, Her2 neg  [id number], 8mm tumor.   Low risk oncotype:  Began Lennon 10/3/536041/2017    Chief Complaint:  No chief complaint on file.  f/u breast cancer.     History of Present Illness:   Gil Cueva is a 55 y.o. female who presents routine f/u for breast cancer.     Patient has the flu currently.  No new complaints otherwise.     Mammo neg 8/20/2019.   No Labs    Family and Social history reviewed and is unchanged from 9/16/2011.       ROS:  Review of Systems   Constitutional: Negative for appetite change, fever and unexpected weight change.   Eyes: Negative for visual disturbance.   Respiratory: Negative for cough and shortness of breath.    Cardiovascular: Negative for chest pain and leg swelling.   Gastrointestinal: Negative for abdominal pain, blood in stool and diarrhea.   Genitourinary: Negative for frequency and hematuria.   Musculoskeletal: Negative for back pain.   Skin: Negative for rash.   Neurological: Negative for headaches.   Hematological: Negative for adenopathy.   Psychiatric/Behavioral: The patient is not nervous/anxious.           Current Outpatient Medications:     ALPRAZolam (XANAX) 0.5 MG tablet, Take 1 tablet (0.5 mg total) by mouth 3 (three) times daily as needed for Anxiety., Disp: 90 tablet, Rfl: 0        Objective:       Physical Examination:     BP (!) 139/90   Pulse 76   Temp 98.6 °F (37 °C) (Oral)   Resp 19   Wt 81.6 kg (179 lb 12.8 oz)   BMI 29.02 kg/m²     Physical Exam   Constitutional: She appears well-developed and well-nourished.   HENT:   Head: Normocephalic and atraumatic.   Right Ear: External ear normal.   Left Ear: External ear normal.   Mouth/Throat: Oropharynx is clear and moist.   Eyes: Pupils are equal, round, and reactive to light. Conjunctivae are normal.    Neck: No tracheal deviation present. No thyromegaly present.   Cardiovascular: Normal rate, regular rhythm and normal heart sounds.   Pulmonary/Chest: Effort normal and breath sounds normal.       Abdominal: Soft. Bowel sounds are normal. She exhibits no distension and no mass. There is no tenderness.   Musculoskeletal: She exhibits no edema.   Neurological:   Neuro intact througout   Skin: No rash noted.   Psychiatric: She has a normal mood and affect. Her behavior is normal. Judgment and thought content normal.       Labs:   No results found for this or any previous visit (from the past 336 hour(s)).  CMP  Sodium   Date Value Ref Range Status   09/23/2019 139 136 - 145 mmol/L Final   09/23/2019 139 136 - 145 mmol/L Final     Potassium   Date Value Ref Range Status   09/23/2019 4.9 3.5 - 5.1 mmol/L Final   09/23/2019 4.9 3.5 - 5.1 mmol/L Final     Chloride   Date Value Ref Range Status   09/23/2019 105 95 - 110 mmol/L Final   09/23/2019 105 95 - 110 mmol/L Final     CO2   Date Value Ref Range Status   09/23/2019 25 23 - 29 mmol/L Final   09/23/2019 25 23 - 29 mmol/L Final     Glucose   Date Value Ref Range Status   09/23/2019 89 70 - 110 mg/dL Final   09/23/2019 89 70 - 110 mg/dL Final     BUN, Bld   Date Value Ref Range Status   09/23/2019 18 6 - 20 mg/dL Final   09/23/2019 18 6 - 20 mg/dL Final     Creatinine   Date Value Ref Range Status   09/23/2019 0.8 0.5 - 1.4 mg/dL Final   09/23/2019 0.8 0.5 - 1.4 mg/dL Final     Calcium   Date Value Ref Range Status   09/23/2019 9.5 8.7 - 10.5 mg/dL Final   09/23/2019 9.5 8.7 - 10.5 mg/dL Final     Total Protein   Date Value Ref Range Status   09/23/2019 7.4 6.0 - 8.4 g/dL Final   09/23/2019 7.4 6.0 - 8.4 g/dL Final     Albumin   Date Value Ref Range Status   09/23/2019 4.2 3.5 - 5.2 g/dL Final   09/23/2019 4.2 3.5 - 5.2 g/dL Final     Total Bilirubin   Date Value Ref Range Status   09/23/2019 0.4 0.1 - 1.0 mg/dL Final     Comment:     For infants and newborns,  interpretation of results should be based  on gestational age, weight and in agreement with clinical  observations.  Premature Infant recommended reference ranges:  Up to 24 hours.............<8.0 mg/dL  Up to 48 hours............<12.0 mg/dL  3-5 days..................<15.0 mg/dL  6-29 days.................<15.0 mg/dL     09/23/2019 0.4 0.1 - 1.0 mg/dL Final     Comment:     For infants and newborns, interpretation of results should be based  on gestational age, weight and in agreement with clinical  observations.  Premature Infant recommended reference ranges:  Up to 24 hours.............<8.0 mg/dL  Up to 48 hours............<12.0 mg/dL  3-5 days..................<15.0 mg/dL  6-29 days.................<15.0 mg/dL       Alkaline Phosphatase   Date Value Ref Range Status   09/23/2019 53 (L) 55 - 135 U/L Final   09/23/2019 53 (L) 55 - 135 U/L Final     AST   Date Value Ref Range Status   09/23/2019 14 10 - 40 U/L Final   09/23/2019 14 10 - 40 U/L Final     ALT   Date Value Ref Range Status   09/23/2019 10 10 - 44 U/L Final   09/23/2019 10 10 - 44 U/L Final     Anion Gap   Date Value Ref Range Status   09/23/2019 9 8 - 16 mmol/L Final   09/23/2019 9 8 - 16 mmol/L Final     eGFR if    Date Value Ref Range Status   09/23/2019 >60.0 >60 mL/min/1.73 m^2 Final   09/23/2019 >60.0 >60 mL/min/1.73 m^2 Final     eGFR if non    Date Value Ref Range Status   09/23/2019 >60.0 >60 mL/min/1.73 m^2 Final     Comment:     Calculation used to obtain the estimated glomerular filtration  rate (eGFR) is the CKD-EPI equation.      09/23/2019 >60.0 >60 mL/min/1.73 m^2 Final     Comment:     Calculation used to obtain the estimated glomerular filtration  rate (eGFR) is the CKD-EPI equation.        No results found for: CEA  No results found for: PSA        Assessment/Plan:     Problem List Items Addressed This Visit     Malignant neoplasm of central portion of left breast in female, estrogen receptor positive  (Chronic)     Patient is doing well from this standpoint and appears MARIO.  Will continue to see her on a six month basis and will get mammogram in August of 2020.           Relevant Orders    Mammo Digital Diagnostic Bilat w/ Nabeel    US Breast Bilateral Complete          Discussion:     Follow up in about 6 months (around 8/17/2020).      Electronically signed by Jona Arrington

## 2020-04-23 ENCOUNTER — PATIENT MESSAGE (OUTPATIENT)
Dept: FAMILY MEDICINE | Facility: CLINIC | Age: 55
End: 2020-04-23

## 2020-04-23 DIAGNOSIS — F41.1 GAD (GENERALIZED ANXIETY DISORDER): ICD-10-CM

## 2020-04-23 DIAGNOSIS — G47.00 INSOMNIA, UNSPECIFIED TYPE: ICD-10-CM

## 2020-04-24 ENCOUNTER — PATIENT MESSAGE (OUTPATIENT)
Dept: FAMILY MEDICINE | Facility: CLINIC | Age: 55
End: 2020-04-24

## 2020-04-24 RX ORDER — ALPRAZOLAM 0.5 MG/1
0.5 TABLET ORAL 3 TIMES DAILY PRN
Qty: 90 TABLET | Refills: 0 | Status: SHIPPED | OUTPATIENT
Start: 2020-04-24 | End: 2020-07-14 | Stop reason: SDUPTHER

## 2020-04-30 DIAGNOSIS — R10.84 GENERALIZED ABDOMINAL PAIN: ICD-10-CM

## 2020-05-05 ENCOUNTER — HOSPITAL ENCOUNTER (OUTPATIENT)
Dept: RADIOLOGY | Facility: HOSPITAL | Age: 55
Discharge: HOME | End: 2020-05-05
Attending: SPECIALIST
Payer: COMMERCIAL

## 2020-05-05 DIAGNOSIS — R10.84 GENERALIZED ABDOMINAL PAIN: ICD-10-CM

## 2020-05-05 PROCEDURE — 25500020 PHARM REV CODE 255: Mod: PO | Performed by: SPECIALIST

## 2020-05-05 PROCEDURE — 74178 CT ABD&PLV WO CNTR FLWD CNTR: CPT | Mod: TC,PO

## 2020-05-05 RX ADMIN — IOHEXOL 100 ML: 350 INJECTION, SOLUTION INTRAVENOUS at 10:05

## 2020-07-30 ENCOUNTER — PATIENT MESSAGE (OUTPATIENT)
Dept: FAMILY MEDICINE | Facility: CLINIC | Age: 55
End: 2020-07-30

## 2020-07-30 DIAGNOSIS — Z01.84 ENCOUNTER FOR ANTIBODY RESPONSE EXAMINATION: ICD-10-CM

## 2020-08-10 ENCOUNTER — LAB VISIT (OUTPATIENT)
Dept: LAB | Facility: HOSPITAL | Age: 55
End: 2020-08-10
Attending: FAMILY MEDICINE
Payer: COMMERCIAL

## 2020-08-10 DIAGNOSIS — E78.00 HYPERCHOLESTEREMIA: ICD-10-CM

## 2020-08-10 DIAGNOSIS — Z01.84 ENCOUNTER FOR ANTIBODY RESPONSE EXAMINATION: ICD-10-CM

## 2020-08-10 LAB
ALBUMIN SERPL BCP-MCNC: 4.2 G/DL (ref 3.5–5.2)
ALP SERPL-CCNC: 52 U/L (ref 55–135)
ALT SERPL W/O P-5'-P-CCNC: 18 U/L (ref 10–44)
ANION GAP SERPL CALC-SCNC: 9 MMOL/L (ref 8–16)
AST SERPL-CCNC: 18 U/L (ref 10–40)
BASOPHILS # BLD AUTO: 0.03 K/UL (ref 0–0.2)
BASOPHILS NFR BLD: 0.7 % (ref 0–1.9)
BILIRUB SERPL-MCNC: 0.6 MG/DL (ref 0.1–1)
BUN SERPL-MCNC: 20 MG/DL (ref 6–20)
CALCIUM SERPL-MCNC: 9.5 MG/DL (ref 8.7–10.5)
CHLORIDE SERPL-SCNC: 106 MMOL/L (ref 95–110)
CHOLEST SERPL-MCNC: 233 MG/DL (ref 120–199)
CHOLEST/HDLC SERPL: 3.2 {RATIO} (ref 2–5)
CO2 SERPL-SCNC: 25 MMOL/L (ref 23–29)
CREAT SERPL-MCNC: 0.8 MG/DL (ref 0.5–1.4)
DIFFERENTIAL METHOD: ABNORMAL
EOSINOPHIL # BLD AUTO: 0 K/UL (ref 0–0.5)
EOSINOPHIL NFR BLD: 0.9 % (ref 0–8)
ERYTHROCYTE [DISTWIDTH] IN BLOOD BY AUTOMATED COUNT: 12.8 % (ref 11.5–14.5)
EST. GFR  (AFRICAN AMERICAN): >60 ML/MIN/1.73 M^2
EST. GFR  (NON AFRICAN AMERICAN): >60 ML/MIN/1.73 M^2
GLUCOSE SERPL-MCNC: 92 MG/DL (ref 70–110)
HCT VFR BLD AUTO: 40.2 % (ref 37–48.5)
HDLC SERPL-MCNC: 73 MG/DL (ref 40–75)
HDLC SERPL: 31.3 % (ref 20–50)
HGB BLD-MCNC: 12.8 G/DL (ref 12–16)
IMM GRANULOCYTES # BLD AUTO: 0.01 K/UL (ref 0–0.04)
IMM GRANULOCYTES NFR BLD AUTO: 0.2 % (ref 0–0.5)
LDLC SERPL CALC-MCNC: 145.6 MG/DL (ref 63–159)
LYMPHOCYTES # BLD AUTO: 1.7 K/UL (ref 1–4.8)
LYMPHOCYTES NFR BLD: 35.9 % (ref 18–48)
MCH RBC QN AUTO: 31.4 PG (ref 27–31)
MCHC RBC AUTO-ENTMCNC: 31.8 G/DL (ref 32–36)
MCV RBC AUTO: 99 FL (ref 82–98)
MONOCYTES # BLD AUTO: 0.4 K/UL (ref 0.3–1)
MONOCYTES NFR BLD: 7.6 % (ref 4–15)
NEUTROPHILS # BLD AUTO: 2.5 K/UL (ref 1.8–7.7)
NEUTROPHILS NFR BLD: 54.7 % (ref 38–73)
NONHDLC SERPL-MCNC: 160 MG/DL
NRBC BLD-RTO: 0 /100 WBC
PLATELET # BLD AUTO: 358 K/UL (ref 150–350)
PMV BLD AUTO: 10.4 FL (ref 9.2–12.9)
POTASSIUM SERPL-SCNC: 4.6 MMOL/L (ref 3.5–5.1)
PROT SERPL-MCNC: 7.4 G/DL (ref 6–8.4)
RBC # BLD AUTO: 4.07 M/UL (ref 4–5.4)
SARS-COV-2 IGG SERPLBLD QL IA.RAPID: NEGATIVE
SODIUM SERPL-SCNC: 140 MMOL/L (ref 136–145)
TRIGL SERPL-MCNC: 72 MG/DL (ref 30–150)
WBC # BLD AUTO: 4.59 K/UL (ref 3.9–12.7)

## 2020-08-10 PROCEDURE — 85025 COMPLETE CBC W/AUTO DIFF WBC: CPT

## 2020-08-10 PROCEDURE — 86769 SARS-COV-2 COVID-19 ANTIBODY: CPT

## 2020-08-10 PROCEDURE — 80053 COMPREHEN METABOLIC PANEL: CPT

## 2020-08-10 PROCEDURE — 36415 COLL VENOUS BLD VENIPUNCTURE: CPT | Mod: PO

## 2020-08-10 PROCEDURE — 80061 LIPID PANEL: CPT

## 2020-08-21 ENCOUNTER — HOSPITAL ENCOUNTER (OUTPATIENT)
Dept: RADIOLOGY | Facility: HOSPITAL | Age: 55
Discharge: HOME | End: 2020-08-21
Attending: INTERNAL MEDICINE
Payer: COMMERCIAL

## 2020-08-21 VITALS — WEIGHT: 179.88 LBS | BODY MASS INDEX: 28.91 KG/M2 | HEIGHT: 66 IN

## 2020-08-21 DIAGNOSIS — Z17.0 MALIGNANT NEOPLASM OF CENTRAL PORTION OF LEFT BREAST IN FEMALE, ESTROGEN RECEPTOR POSITIVE: ICD-10-CM

## 2020-08-21 DIAGNOSIS — Z17.0 MALIGNANT NEOPLASM OF CENTRAL PORTION OF LEFT BREAST IN FEMALE, ESTROGEN RECEPTOR POSITIVE: Chronic | ICD-10-CM

## 2020-08-21 DIAGNOSIS — C50.112 MALIGNANT NEOPLASM OF CENTRAL PORTION OF LEFT BREAST IN FEMALE, ESTROGEN RECEPTOR POSITIVE: ICD-10-CM

## 2020-08-21 DIAGNOSIS — C50.112 MALIGNANT NEOPLASM OF CENTRAL PORTION OF LEFT BREAST IN FEMALE, ESTROGEN RECEPTOR POSITIVE: Chronic | ICD-10-CM

## 2020-08-21 PROCEDURE — 77066 DX MAMMO INCL CAD BI: CPT | Mod: TC,PO

## 2020-08-26 ENCOUNTER — OFFICE VISIT (OUTPATIENT)
Dept: HEMATOLOGY/ONCOLOGY | Facility: CLINIC | Age: 55
End: 2020-08-26
Payer: COMMERCIAL

## 2020-08-26 VITALS
HEART RATE: 97 BPM | TEMPERATURE: 97 F | WEIGHT: 184.31 LBS | RESPIRATION RATE: 17 BRPM | DIASTOLIC BLOOD PRESSURE: 88 MMHG | BODY MASS INDEX: 29.75 KG/M2 | SYSTOLIC BLOOD PRESSURE: 137 MMHG

## 2020-08-26 DIAGNOSIS — C50.112 MALIGNANT NEOPLASM OF CENTRAL PORTION OF LEFT BREAST IN FEMALE, ESTROGEN RECEPTOR POSITIVE: Chronic | ICD-10-CM

## 2020-08-26 DIAGNOSIS — Z17.0 MALIGNANT NEOPLASM OF CENTRAL PORTION OF LEFT BREAST IN FEMALE, ESTROGEN RECEPTOR POSITIVE: Chronic | ICD-10-CM

## 2020-08-26 PROCEDURE — 99213 OFFICE O/P EST LOW 20 MIN: CPT | Mod: S$GLB,,, | Performed by: INTERNAL MEDICINE

## 2020-08-26 PROCEDURE — 99213 PR OFFICE/OUTPT VISIT, EST, LEVL III, 20-29 MIN: ICD-10-PCS | Mod: S$GLB,,, | Performed by: INTERNAL MEDICINE

## 2020-08-26 NOTE — PROGRESS NOTES
PROGRESS NOTE    Subjective:       Patient ID: Gil Cueva is a 55 y.o. female.    Dx: 8/8/2011, IDCA  Lump/ALND: 8/22/2011  ER 99%, %, Her2 neg  [id number], 8mm tumor.   Low risk oncotype:  Began Lennon 10/3/027622/2017    Chief Complaint:  No chief complaint on file.  f/u breast cancer.     History of Present Illness:   Gil Cueva is a 55 y.o. female who presents routine f/u for breast cancer.     Patient has no new complaints today.     Mammo neg 8/21/2020. Labs    Family and Social history reviewed and is unchanged from 9/16/2011.       ROS:  Review of Systems   Constitutional: Negative for appetite change, fever and unexpected weight change.   HENT: Negative for mouth sores.    Eyes: Negative for visual disturbance.   Respiratory: Negative for cough and shortness of breath.    Cardiovascular: Negative for chest pain and leg swelling.   Gastrointestinal: Negative for abdominal pain, blood in stool and diarrhea.   Genitourinary: Negative for frequency and hematuria.   Musculoskeletal: Negative for back pain.   Skin: Negative for rash.   Neurological: Negative for headaches.   Hematological: Negative for adenopathy.   Psychiatric/Behavioral: The patient is not nervous/anxious.           Current Outpatient Medications:     ALPRAZolam (XANAX) 0.5 MG tablet, Take 1 tablet (0.5 mg total) by mouth 3 (three) times daily as needed for Anxiety., Disp: 90 tablet, Rfl: 0    Current Facility-Administered Medications:     cyanocobalamin injection 1,000 mcg, 1,000 mcg, Subcutaneous, Q30 Days, Jona Pretty MD, 1,000 mcg at 02/17/20 1025        Objective:       Physical Examination:     /88   Pulse 97   Temp 97.4 °F (36.3 °C)   Resp 17   Wt 83.6 kg (184 lb 4.8 oz)   BMI 29.75 kg/m²     Physical Exam  Constitutional:       Appearance: She is well-developed.   HENT:      Head: Normocephalic and atraumatic.      Right Ear: External ear  normal.      Left Ear: External ear normal.   Eyes:      Conjunctiva/sclera: Conjunctivae normal.      Pupils: Pupils are equal, round, and reactive to light.   Neck:      Thyroid: No thyromegaly.      Trachea: No tracheal deviation.   Cardiovascular:      Rate and Rhythm: Normal rate and regular rhythm.      Heart sounds: Normal heart sounds.   Pulmonary:      Effort: Pulmonary effort is normal.      Breath sounds: Normal breath sounds.   Chest:       Abdominal:      General: Bowel sounds are normal. There is no distension.      Palpations: Abdomen is soft. There is no mass.      Tenderness: There is no abdominal tenderness.   Skin:     Findings: No rash.   Neurological:      Comments: Neuro intact througout   Psychiatric:         Behavior: Behavior normal.         Thought Content: Thought content normal.         Judgment: Judgment normal.         Labs:   No results found for this or any previous visit (from the past 336 hour(s)).  CMP  Sodium   Date Value Ref Range Status   08/10/2020 140 136 - 145 mmol/L Final     Potassium   Date Value Ref Range Status   08/10/2020 4.6 3.5 - 5.1 mmol/L Final     Chloride   Date Value Ref Range Status   08/10/2020 106 95 - 110 mmol/L Final     CO2   Date Value Ref Range Status   08/10/2020 25 23 - 29 mmol/L Final     Glucose   Date Value Ref Range Status   08/10/2020 92 70 - 110 mg/dL Final     BUN, Bld   Date Value Ref Range Status   08/10/2020 20 6 - 20 mg/dL Final     Creatinine   Date Value Ref Range Status   08/10/2020 0.8 0.5 - 1.4 mg/dL Final     Calcium   Date Value Ref Range Status   08/10/2020 9.5 8.7 - 10.5 mg/dL Final     Total Protein   Date Value Ref Range Status   08/10/2020 7.4 6.0 - 8.4 g/dL Final     Albumin   Date Value Ref Range Status   08/10/2020 4.2 3.5 - 5.2 g/dL Final     Total Bilirubin   Date Value Ref Range Status   08/10/2020 0.6 0.1 - 1.0 mg/dL Final     Comment:     For infants and newborns, interpretation of results should be based  on gestational  age, weight and in agreement with clinical  observations.  Premature Infant recommended reference ranges:  Up to 24 hours.............<8.0 mg/dL  Up to 48 hours............<12.0 mg/dL  3-5 days..................<15.0 mg/dL  6-29 days.................<15.0 mg/dL       Alkaline Phosphatase   Date Value Ref Range Status   08/10/2020 52 (L) 55 - 135 U/L Final     AST   Date Value Ref Range Status   08/10/2020 18 10 - 40 U/L Final     ALT   Date Value Ref Range Status   08/10/2020 18 10 - 44 U/L Final     Anion Gap   Date Value Ref Range Status   08/10/2020 9 8 - 16 mmol/L Final     eGFR if    Date Value Ref Range Status   08/10/2020 >60.0 >60 mL/min/1.73 m^2 Final     eGFR if non    Date Value Ref Range Status   08/10/2020 >60.0 >60 mL/min/1.73 m^2 Final     Comment:     Calculation used to obtain the estimated glomerular filtration  rate (eGFR) is the CKD-EPI equation.        No results found for: CEA  No results found for: PSA        Assessment/Plan:     Problem List Items Addressed This Visit     Malignant neoplasm of central portion of left breast in female, estrogen receptor positive (Chronic)     Patient is doing well at this time and mammogram and exam is negative.  Patient appears MARIO.  Will continue to watch her on a six month basis and discussed this today.                Discussion:     Follow up in about 6 months (around 2/26/2021).      Electronically signed by Jona Arrington

## 2020-08-26 NOTE — ASSESSMENT & PLAN NOTE
Patient is doing well at this time and mammogram and exam is negative.  Patient appears MARIO.  Will continue to watch her on a six month basis and discussed this today.

## 2020-09-08 DIAGNOSIS — F41.1 GAD (GENERALIZED ANXIETY DISORDER): ICD-10-CM

## 2020-09-08 DIAGNOSIS — G47.00 INSOMNIA, UNSPECIFIED TYPE: ICD-10-CM

## 2020-09-08 RX ORDER — ALPRAZOLAM 0.5 MG/1
0.5 TABLET ORAL 3 TIMES DAILY PRN
Qty: 90 TABLET | Refills: 0 | Status: SHIPPED | OUTPATIENT
Start: 2020-09-08 | End: 2020-10-26 | Stop reason: SDUPTHER

## 2020-09-08 NOTE — TELEPHONE ENCOUNTER
LR--7-  LOV--2-  FOV--9-  [facility] Drug Screen--None Noted   Pain Contract--None Noted  Checked [place] Prescription Monitoring Program site. No unusual or abnormal behavior noted.Confirmed per  this medication last refilled on 7-.

## 2020-10-26 DIAGNOSIS — F41.1 GAD (GENERALIZED ANXIETY DISORDER): ICD-10-CM

## 2020-10-26 DIAGNOSIS — G47.00 INSOMNIA, UNSPECIFIED TYPE: ICD-10-CM

## 2020-10-27 RX ORDER — ALPRAZOLAM 0.5 MG/1
0.5 TABLET ORAL 3 TIMES DAILY PRN
Qty: 90 TABLET | Refills: 0 | Status: SHIPPED | OUTPATIENT
Start: 2020-10-27 | End: 2020-12-03 | Stop reason: SDUPTHER

## 2020-12-03 DIAGNOSIS — F41.1 GAD (GENERALIZED ANXIETY DISORDER): ICD-10-CM

## 2020-12-03 DIAGNOSIS — G47.00 INSOMNIA, UNSPECIFIED TYPE: ICD-10-CM

## 2020-12-06 RX ORDER — ALPRAZOLAM 0.5 MG/1
0.5 TABLET ORAL 3 TIMES DAILY PRN
Qty: 90 TABLET | Refills: 0 | Status: SHIPPED | OUTPATIENT
Start: 2020-12-06 | End: 2021-01-11 | Stop reason: SDUPTHER

## 2021-01-11 DIAGNOSIS — G47.00 INSOMNIA, UNSPECIFIED TYPE: ICD-10-CM

## 2021-01-11 DIAGNOSIS — F41.1 GAD (GENERALIZED ANXIETY DISORDER): ICD-10-CM

## 2021-01-15 RX ORDER — ALPRAZOLAM 0.5 MG/1
0.5 TABLET ORAL 3 TIMES DAILY PRN
Qty: 90 TABLET | Refills: 0 | Status: SHIPPED | OUTPATIENT
Start: 2021-01-15 | End: 2021-02-08 | Stop reason: SDUPTHER

## 2021-02-01 ENCOUNTER — OFFICE VISIT (OUTPATIENT)
Dept: FAMILY MEDICINE | Facility: CLINIC | Age: 56
End: 2021-02-01
Payer: COMMERCIAL

## 2021-02-01 ENCOUNTER — LAB VISIT (OUTPATIENT)
Dept: LAB | Facility: HOSPITAL | Age: 56
End: 2021-02-01
Attending: NURSE PRACTITIONER
Payer: COMMERCIAL

## 2021-02-01 VITALS
DIASTOLIC BLOOD PRESSURE: 62 MMHG | HEIGHT: 66 IN | HEART RATE: 80 BPM | TEMPERATURE: 97 F | SYSTOLIC BLOOD PRESSURE: 116 MMHG | OXYGEN SATURATION: 96 % | BODY MASS INDEX: 31.75 KG/M2 | WEIGHT: 197.56 LBS

## 2021-02-01 DIAGNOSIS — F41.1 GAD (GENERALIZED ANXIETY DISORDER): ICD-10-CM

## 2021-02-01 DIAGNOSIS — E66.9 OBESITY (BMI 30.0-34.9): ICD-10-CM

## 2021-02-01 DIAGNOSIS — Z01.89 PATIENT REQUESTED DIAGNOSTIC TESTING: ICD-10-CM

## 2021-02-01 DIAGNOSIS — F41.1 GAD (GENERALIZED ANXIETY DISORDER): Primary | ICD-10-CM

## 2021-02-01 PROCEDURE — 82607 VITAMIN B-12: CPT

## 2021-02-01 PROCEDURE — 83540 ASSAY OF IRON: CPT

## 2021-02-01 PROCEDURE — 86769 SARS-COV-2 COVID-19 ANTIBODY: CPT

## 2021-02-01 PROCEDURE — 36415 COLL VENOUS BLD VENIPUNCTURE: CPT | Mod: PO

## 2021-02-01 PROCEDURE — 3008F PR BODY MASS INDEX (BMI) DOCUMENTED: ICD-10-PCS | Mod: CPTII,S$GLB,, | Performed by: NURSE PRACTITIONER

## 2021-02-01 PROCEDURE — 1125F AMNT PAIN NOTED PAIN PRSNT: CPT | Mod: S$GLB,,, | Performed by: NURSE PRACTITIONER

## 2021-02-01 PROCEDURE — 99999 PR PBB SHADOW E&M-EST. PATIENT-LVL IV: CPT | Mod: PBBFAC,,, | Performed by: NURSE PRACTITIONER

## 2021-02-01 PROCEDURE — 99999 PR PBB SHADOW E&M-EST. PATIENT-LVL IV: ICD-10-PCS | Mod: PBBFAC,,, | Performed by: NURSE PRACTITIONER

## 2021-02-01 PROCEDURE — 80307 DRUG TEST PRSMV CHEM ANLYZR: CPT

## 2021-02-01 PROCEDURE — 99213 PR OFFICE/OUTPT VISIT, EST, LEVL III, 20-29 MIN: ICD-10-PCS | Mod: S$GLB,,, | Performed by: NURSE PRACTITIONER

## 2021-02-01 PROCEDURE — 82728 ASSAY OF FERRITIN: CPT

## 2021-02-01 PROCEDURE — 3008F BODY MASS INDEX DOCD: CPT | Mod: CPTII,S$GLB,, | Performed by: NURSE PRACTITIONER

## 2021-02-01 PROCEDURE — 1125F PR PAIN SEVERITY QUANTIFIED, PAIN PRESENT: ICD-10-PCS | Mod: S$GLB,,, | Performed by: NURSE PRACTITIONER

## 2021-02-01 PROCEDURE — 99213 OFFICE O/P EST LOW 20 MIN: CPT | Mod: S$GLB,,, | Performed by: NURSE PRACTITIONER

## 2021-02-02 LAB
FERRITIN SERPL-MCNC: 118 NG/ML (ref 20–300)
IRON SERPL-MCNC: 93 UG/DL (ref 30–160)
SARS-COV-2 IGG SERPLBLD QL IA.RAPID: NEGATIVE
SATURATED IRON: 23 % (ref 20–50)
TOTAL IRON BINDING CAPACITY: 404 UG/DL (ref 250–450)
TRANSFERRIN SERPL-MCNC: 273 MG/DL (ref 200–375)
VIT B12 SERPL-MCNC: 479 PG/ML (ref 210–950)

## 2021-02-05 LAB
6MAM UR QL: NOT DETECTED
7AMINOCLONAZEPAM UR QL: NOT DETECTED
A-OH ALPRAZ UR QL: PRESENT
ALPRAZ UR QL: PRESENT
AMPHET UR QL SCN: NOT DETECTED
ANNOTATION COMMENT IMP: NORMAL
ANNOTATION COMMENT IMP: NORMAL
BARBITURATES UR QL: NOT DETECTED
BUPRENORPHINE UR QL: NOT DETECTED
BZE UR QL: NOT DETECTED
CARBOXYTHC UR QL: NOT DETECTED
CARISOPRODOL UR QL: NOT DETECTED
CLONAZEPAM UR QL: NOT DETECTED
CODEINE UR QL: NOT DETECTED
CREAT UR-MCNC: 42.9 MG/DL (ref 20–400)
DIAZEPAM UR QL: NOT DETECTED
ETHYL GLUCURONIDE UR QL: PRESENT
FENTANYL UR QL: NOT DETECTED
HYDROCODONE UR QL: NOT DETECTED
HYDROMORPHONE UR QL: NOT DETECTED
LORAZEPAM UR QL: NOT DETECTED
MDA UR QL: NOT DETECTED
MDEA UR QL: NOT DETECTED
MDMA UR QL: NOT DETECTED
ME-PHENIDATE UR QL: NOT DETECTED
MEPERIDINE UR QL: NOT DETECTED
METHADONE UR QL: NOT DETECTED
METHAMPHET UR QL: NOT DETECTED
MIDAZOLAM UR QL SCN: NOT DETECTED
MORPHINE UR QL: NOT DETECTED
NORBUPRENORPHINE UR QL CFM: NOT DETECTED
NORDIAZEPAM UR QL: NOT DETECTED
NORFENTANYL UR QL: NOT DETECTED
NORHYDROCODONE UR QL CFM: NOT DETECTED
NOROXYCODONE UR QL CFM: NOT DETECTED
NOROXYMORPHONE: NOT DETECTED
OXAZEPAM UR QL: NOT DETECTED
OXYCODONE UR QL: NOT DETECTED
OXYMORPHONE UR QL: NOT DETECTED
PATHOLOGY STUDY: NORMAL
PCP UR QL: NOT DETECTED
PHENTERMINE UR QL: NOT DETECTED
PROPOXYPH UR QL: NOT DETECTED
SERVICE CMNT-IMP: NORMAL
TAPENTADOL UR QL SCN: NOT DETECTED
TAPENTADOL-O-SULF: NOT DETECTED
TEMAZEPAM UR QL: NOT DETECTED
TRAMADOL UR QL: NOT DETECTED
ZOLPIDEM UR QL: NOT DETECTED

## 2021-02-08 DIAGNOSIS — G47.00 INSOMNIA, UNSPECIFIED TYPE: ICD-10-CM

## 2021-02-08 DIAGNOSIS — F41.1 GAD (GENERALIZED ANXIETY DISORDER): ICD-10-CM

## 2021-02-09 RX ORDER — ALPRAZOLAM 0.5 MG/1
0.5 TABLET ORAL 3 TIMES DAILY PRN
Qty: 90 TABLET | Refills: 0 | Status: SHIPPED | OUTPATIENT
Start: 2021-02-15 | End: 2021-03-13

## 2021-02-24 ENCOUNTER — OFFICE VISIT (OUTPATIENT)
Dept: HEMATOLOGY/ONCOLOGY | Facility: CLINIC | Age: 56
End: 2021-02-24
Payer: COMMERCIAL

## 2021-02-24 VITALS
SYSTOLIC BLOOD PRESSURE: 133 MMHG | HEART RATE: 80 BPM | WEIGHT: 198 LBS | BODY MASS INDEX: 31.82 KG/M2 | RESPIRATION RATE: 18 BRPM | DIASTOLIC BLOOD PRESSURE: 84 MMHG | HEIGHT: 66 IN

## 2021-02-24 DIAGNOSIS — C50.112 MALIGNANT NEOPLASM OF CENTRAL PORTION OF LEFT BREAST IN FEMALE, ESTROGEN RECEPTOR POSITIVE: Chronic | ICD-10-CM

## 2021-02-24 DIAGNOSIS — Z17.0 MALIGNANT NEOPLASM OF CENTRAL PORTION OF LEFT BREAST IN FEMALE, ESTROGEN RECEPTOR POSITIVE: Chronic | ICD-10-CM

## 2021-02-24 PROCEDURE — 99213 OFFICE O/P EST LOW 20 MIN: CPT | Mod: S$GLB,,, | Performed by: INTERNAL MEDICINE

## 2021-02-24 PROCEDURE — 1126F AMNT PAIN NOTED NONE PRSNT: CPT | Mod: S$GLB,,, | Performed by: INTERNAL MEDICINE

## 2021-02-24 PROCEDURE — 99213 PR OFFICE/OUTPT VISIT, EST, LEVL III, 20-29 MIN: ICD-10-PCS | Mod: S$GLB,,, | Performed by: INTERNAL MEDICINE

## 2021-02-24 PROCEDURE — 3008F PR BODY MASS INDEX (BMI) DOCUMENTED: ICD-10-PCS | Mod: S$GLB,,, | Performed by: INTERNAL MEDICINE

## 2021-02-24 PROCEDURE — 3008F BODY MASS INDEX DOCD: CPT | Mod: S$GLB,,, | Performed by: INTERNAL MEDICINE

## 2021-02-24 PROCEDURE — 1126F PR PAIN SEVERITY QUANTIFIED, NO PAIN PRESENT: ICD-10-PCS | Mod: S$GLB,,, | Performed by: INTERNAL MEDICINE

## 2021-03-12 ENCOUNTER — PATIENT MESSAGE (OUTPATIENT)
Dept: FAMILY MEDICINE | Facility: CLINIC | Age: 56
End: 2021-03-12

## 2021-03-12 DIAGNOSIS — F41.1 GAD (GENERALIZED ANXIETY DISORDER): ICD-10-CM

## 2021-03-12 DIAGNOSIS — G47.00 INSOMNIA, UNSPECIFIED TYPE: ICD-10-CM

## 2021-03-12 RX ORDER — ALPRAZOLAM 0.5 MG/1
0.5 TABLET ORAL 3 TIMES DAILY PRN
Qty: 90 TABLET | Refills: 0 | Status: CANCELLED | OUTPATIENT
Start: 2021-03-12 | End: 2021-04-11

## 2021-03-15 ENCOUNTER — PATIENT MESSAGE (OUTPATIENT)
Dept: FAMILY MEDICINE | Facility: CLINIC | Age: 56
End: 2021-03-15

## 2021-03-23 ENCOUNTER — PATIENT MESSAGE (OUTPATIENT)
Dept: HEMATOLOGY/ONCOLOGY | Facility: CLINIC | Age: 56
End: 2021-03-23

## 2021-05-06 ENCOUNTER — PATIENT MESSAGE (OUTPATIENT)
Dept: RESEARCH | Facility: HOSPITAL | Age: 56
End: 2021-05-06

## 2021-06-02 ENCOUNTER — PATIENT MESSAGE (OUTPATIENT)
Dept: FAMILY MEDICINE | Facility: CLINIC | Age: 56
End: 2021-06-02

## 2021-08-17 ENCOUNTER — PATIENT MESSAGE (OUTPATIENT)
Dept: FAMILY MEDICINE | Facility: CLINIC | Age: 56
End: 2021-08-17

## 2021-08-17 DIAGNOSIS — Z20.822 EXPOSURE TO COVID-19 VIRUS: Primary | ICD-10-CM

## 2021-08-19 ENCOUNTER — IMMUNIZATION (OUTPATIENT)
Dept: PRIMARY CARE CLINIC | Facility: CLINIC | Age: 56
End: 2021-08-19
Payer: COMMERCIAL

## 2021-08-19 DIAGNOSIS — Z23 NEED FOR VACCINATION: Primary | ICD-10-CM

## 2021-08-19 PROCEDURE — 0001A COVID-19, MRNA, LNP-S, PF, 30 MCG/0.3 ML DOSE VACCINE: ICD-10-PCS | Mod: CV19,S$GLB,, | Performed by: FAMILY MEDICINE

## 2021-08-19 PROCEDURE — 0001A COVID-19, MRNA, LNP-S, PF, 30 MCG/0.3 ML DOSE VACCINE: CPT | Mod: CV19,S$GLB,, | Performed by: FAMILY MEDICINE

## 2021-08-19 PROCEDURE — 91300 COVID-19, MRNA, LNP-S, PF, 30 MCG/0.3 ML DOSE VACCINE: CPT | Mod: S$GLB,,, | Performed by: FAMILY MEDICINE

## 2021-08-19 PROCEDURE — 91300 COVID-19, MRNA, LNP-S, PF, 30 MCG/0.3 ML DOSE VACCINE: ICD-10-PCS | Mod: S$GLB,,, | Performed by: FAMILY MEDICINE

## 2021-08-20 ENCOUNTER — PATIENT MESSAGE (OUTPATIENT)
Dept: FAMILY MEDICINE | Facility: CLINIC | Age: 56
End: 2021-08-20

## 2021-08-25 ENCOUNTER — LAB VISIT (OUTPATIENT)
Dept: LAB | Facility: HOSPITAL | Age: 56
End: 2021-08-25
Attending: INTERNAL MEDICINE
Payer: COMMERCIAL

## 2021-08-25 ENCOUNTER — OFFICE VISIT (OUTPATIENT)
Dept: HEMATOLOGY/ONCOLOGY | Facility: CLINIC | Age: 56
End: 2021-08-25
Payer: COMMERCIAL

## 2021-08-25 VITALS
BODY MASS INDEX: 32.02 KG/M2 | WEIGHT: 204 LBS | SYSTOLIC BLOOD PRESSURE: 135 MMHG | RESPIRATION RATE: 18 BRPM | DIASTOLIC BLOOD PRESSURE: 85 MMHG | HEART RATE: 78 BPM | HEIGHT: 67 IN

## 2021-08-25 DIAGNOSIS — C50.112 MALIGNANT NEOPLASM OF CENTRAL PORTION OF LEFT BREAST IN FEMALE, ESTROGEN RECEPTOR POSITIVE: Chronic | ICD-10-CM

## 2021-08-25 DIAGNOSIS — Z17.0 MALIGNANT NEOPLASM OF CENTRAL PORTION OF LEFT BREAST IN FEMALE, ESTROGEN RECEPTOR POSITIVE: Chronic | ICD-10-CM

## 2021-08-25 DIAGNOSIS — M25.50 ARTHRALGIA, UNSPECIFIED JOINT: ICD-10-CM

## 2021-08-25 LAB
ALBUMIN SERPL BCP-MCNC: 4.4 G/DL (ref 3.5–5.2)
ALP SERPL-CCNC: 57 U/L (ref 55–135)
ALT SERPL W/O P-5'-P-CCNC: 26 U/L (ref 10–44)
ANION GAP SERPL CALC-SCNC: 8 MMOL/L (ref 8–16)
AST SERPL-CCNC: 21 U/L (ref 10–40)
BASOPHILS # BLD AUTO: 0.04 K/UL (ref 0–0.2)
BASOPHILS NFR BLD: 0.7 % (ref 0–1.9)
BILIRUB SERPL-MCNC: 0.8 MG/DL (ref 0.1–1)
BUN SERPL-MCNC: 18 MG/DL (ref 6–20)
CALCIUM SERPL-MCNC: 9.3 MG/DL (ref 8.7–10.5)
CHLORIDE SERPL-SCNC: 106 MMOL/L (ref 95–110)
CO2 SERPL-SCNC: 26 MMOL/L (ref 23–29)
CREAT SERPL-MCNC: 0.8 MG/DL (ref 0.5–1.4)
CRP SERPL-MCNC: 0.66 MG/DL
DIFFERENTIAL METHOD: ABNORMAL
EOSINOPHIL # BLD AUTO: 0 K/UL (ref 0–0.5)
EOSINOPHIL NFR BLD: 0.7 % (ref 0–8)
ERYTHROCYTE [DISTWIDTH] IN BLOOD BY AUTOMATED COUNT: 12.6 % (ref 11.5–14.5)
ERYTHROCYTE [SEDIMENTATION RATE] IN BLOOD BY WESTERGREN METHOD: 17 MM/HR (ref 0–20)
EST. GFR  (AFRICAN AMERICAN): >60 ML/MIN/1.73 M^2
EST. GFR  (NON AFRICAN AMERICAN): >60 ML/MIN/1.73 M^2
GLUCOSE SERPL-MCNC: 100 MG/DL (ref 70–110)
HCT VFR BLD AUTO: 40.6 % (ref 37–48.5)
HGB BLD-MCNC: 13.5 G/DL (ref 12–16)
IMM GRANULOCYTES # BLD AUTO: 0.02 K/UL (ref 0–0.04)
IMM GRANULOCYTES NFR BLD AUTO: 0.3 % (ref 0–0.5)
LYMPHOCYTES # BLD AUTO: 1.9 K/UL (ref 1–4.8)
LYMPHOCYTES NFR BLD: 31.2 % (ref 18–48)
MCH RBC QN AUTO: 31.8 PG (ref 27–31)
MCHC RBC AUTO-ENTMCNC: 33.3 G/DL (ref 32–36)
MCV RBC AUTO: 96 FL (ref 82–98)
MONOCYTES # BLD AUTO: 0.5 K/UL (ref 0.3–1)
MONOCYTES NFR BLD: 8.4 % (ref 4–15)
NEUTROPHILS # BLD AUTO: 3.5 K/UL (ref 1.8–7.7)
NEUTROPHILS NFR BLD: 58.7 % (ref 38–73)
NRBC BLD-RTO: 0 /100 WBC
PLATELET # BLD AUTO: 329 K/UL (ref 150–450)
PMV BLD AUTO: 9.2 FL (ref 9.2–12.9)
POTASSIUM SERPL-SCNC: 4.7 MMOL/L (ref 3.5–5.1)
PROT SERPL-MCNC: 7.5 G/DL (ref 6–8.4)
RBC # BLD AUTO: 4.25 M/UL (ref 4–5.4)
SODIUM SERPL-SCNC: 140 MMOL/L (ref 136–145)
WBC # BLD AUTO: 5.96 K/UL (ref 3.9–12.7)

## 2021-08-25 PROCEDURE — 85651 RBC SED RATE NONAUTOMATED: CPT | Performed by: INTERNAL MEDICINE

## 2021-08-25 PROCEDURE — 86431 RHEUMATOID FACTOR QUANT: CPT | Performed by: INTERNAL MEDICINE

## 2021-08-25 PROCEDURE — 85025 COMPLETE CBC W/AUTO DIFF WBC: CPT | Performed by: INTERNAL MEDICINE

## 2021-08-25 PROCEDURE — 36415 COLL VENOUS BLD VENIPUNCTURE: CPT | Performed by: INTERNAL MEDICINE

## 2021-08-25 PROCEDURE — 99214 OFFICE O/P EST MOD 30 MIN: CPT | Mod: S$GLB,,, | Performed by: INTERNAL MEDICINE

## 2021-08-25 PROCEDURE — 86140 C-REACTIVE PROTEIN: CPT | Performed by: INTERNAL MEDICINE

## 2021-08-25 PROCEDURE — 99214 PR OFFICE/OUTPT VISIT, EST, LEVL IV, 30-39 MIN: ICD-10-PCS | Mod: S$GLB,,, | Performed by: INTERNAL MEDICINE

## 2021-08-25 PROCEDURE — 80053 COMPREHEN METABOLIC PANEL: CPT | Performed by: INTERNAL MEDICINE

## 2021-08-25 PROCEDURE — 86038 ANTINUCLEAR ANTIBODIES: CPT | Performed by: INTERNAL MEDICINE

## 2021-08-26 LAB
ANA TITR SER IF: NEGATIVE {TITER}
RHEUMATOID FACT SERPL-ACNC: <10 IU/ML (ref 0–13.9)

## 2021-09-09 ENCOUNTER — IMMUNIZATION (OUTPATIENT)
Dept: PRIMARY CARE CLINIC | Facility: CLINIC | Age: 56
End: 2021-09-09
Payer: COMMERCIAL

## 2021-09-09 DIAGNOSIS — Z23 NEED FOR VACCINATION: Primary | ICD-10-CM

## 2021-09-09 PROCEDURE — 0002A COVID-19, MRNA, LNP-S, PF, 30 MCG/0.3 ML DOSE VACCINE: ICD-10-PCS | Mod: CV19,S$GLB,, | Performed by: FAMILY MEDICINE

## 2021-09-09 PROCEDURE — 0002A COVID-19, MRNA, LNP-S, PF, 30 MCG/0.3 ML DOSE VACCINE: CPT | Mod: CV19,S$GLB,, | Performed by: FAMILY MEDICINE

## 2021-09-09 PROCEDURE — 91300 COVID-19, MRNA, LNP-S, PF, 30 MCG/0.3 ML DOSE VACCINE: ICD-10-PCS | Mod: S$GLB,,, | Performed by: FAMILY MEDICINE

## 2021-09-09 PROCEDURE — 91300 COVID-19, MRNA, LNP-S, PF, 30 MCG/0.3 ML DOSE VACCINE: CPT | Mod: S$GLB,,, | Performed by: FAMILY MEDICINE

## 2021-09-16 ENCOUNTER — PATIENT MESSAGE (OUTPATIENT)
Dept: FAMILY MEDICINE | Facility: CLINIC | Age: 56
End: 2021-09-16

## 2021-09-17 ENCOUNTER — PATIENT MESSAGE (OUTPATIENT)
Dept: FAMILY MEDICINE | Facility: CLINIC | Age: 56
End: 2021-09-17

## 2021-09-17 DIAGNOSIS — G47.00 INSOMNIA, UNSPECIFIED TYPE: ICD-10-CM

## 2021-09-17 DIAGNOSIS — F41.1 GAD (GENERALIZED ANXIETY DISORDER): ICD-10-CM

## 2021-09-17 RX ORDER — ALPRAZOLAM 0.5 MG/1
TABLET ORAL
Qty: 90 TABLET | Refills: 0 | Status: SHIPPED | OUTPATIENT
Start: 2021-09-17 | End: 2022-01-28 | Stop reason: ALTCHOICE

## 2021-09-20 ENCOUNTER — PATIENT MESSAGE (OUTPATIENT)
Dept: FAMILY MEDICINE | Facility: CLINIC | Age: 56
End: 2021-09-20

## 2022-01-28 ENCOUNTER — TELEPHONE (OUTPATIENT)
Dept: SPINE | Facility: CLINIC | Age: 57
End: 2022-01-28
Payer: COMMERCIAL

## 2022-01-28 ENCOUNTER — OFFICE VISIT (OUTPATIENT)
Dept: FAMILY MEDICINE | Facility: CLINIC | Age: 57
End: 2022-01-28
Payer: MEDICARE

## 2022-01-28 VITALS
HEIGHT: 67 IN | HEART RATE: 78 BPM | TEMPERATURE: 99 F | RESPIRATION RATE: 16 BRPM | DIASTOLIC BLOOD PRESSURE: 78 MMHG | BODY MASS INDEX: 32.76 KG/M2 | OXYGEN SATURATION: 95 % | WEIGHT: 208.75 LBS | SYSTOLIC BLOOD PRESSURE: 122 MMHG

## 2022-01-28 DIAGNOSIS — F41.1 GAD (GENERALIZED ANXIETY DISORDER): ICD-10-CM

## 2022-01-28 DIAGNOSIS — G47.00 INSOMNIA, UNSPECIFIED TYPE: ICD-10-CM

## 2022-01-28 DIAGNOSIS — S09.90XS HEAD TRAUMA, SEQUELA: Primary | ICD-10-CM

## 2022-01-28 DIAGNOSIS — M50.30 DDD (DEGENERATIVE DISC DISEASE), CERVICAL: ICD-10-CM

## 2022-01-28 PROCEDURE — 99999 PR PBB SHADOW E&M-EST. PATIENT-LVL IV: CPT | Mod: PBBFAC,,, | Performed by: FAMILY MEDICINE

## 2022-01-28 PROCEDURE — 99999 PR PBB SHADOW E&M-EST. PATIENT-LVL IV: ICD-10-PCS | Mod: PBBFAC,,, | Performed by: FAMILY MEDICINE

## 2022-01-28 PROCEDURE — 99214 OFFICE O/P EST MOD 30 MIN: CPT | Mod: S$PBB,,, | Performed by: FAMILY MEDICINE

## 2022-01-28 PROCEDURE — 99214 PR OFFICE/OUTPT VISIT, EST, LEVL IV, 30-39 MIN: ICD-10-PCS | Mod: S$PBB,,, | Performed by: FAMILY MEDICINE

## 2022-01-28 PROCEDURE — 99214 OFFICE O/P EST MOD 30 MIN: CPT | Mod: PBBFAC,PO | Performed by: FAMILY MEDICINE

## 2022-01-28 RX ORDER — ALPRAZOLAM 0.5 MG/1
TABLET ORAL
Qty: 90 TABLET | Refills: 5 | Status: SHIPPED | OUTPATIENT
Start: 2022-01-28 | End: 2022-07-29 | Stop reason: SDUPTHER
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
## 2022-01-31 ENCOUNTER — TELEPHONE (OUTPATIENT)
Dept: FAMILY MEDICINE | Facility: CLINIC | Age: 57
End: 2022-01-31
Payer: COMMERCIAL

## 2022-02-22 NOTE — PROGRESS NOTES
"                                                         PROGRESS NOTE    Subjective:       Patient ID: Gil Cueva is a 57 y.o. female.    Dx: 8/8/2011, IDCA  Lump/ALND: 8/22/2011  ER 99%, %, Her2 neg  [id number], 8mm tumor.   Low risk oncotype:  Began Lennon 10/3/2011-2/2017    Chief Complaint:  No chief complaint on file.  f/u breast cancer.     History of Present Illness:   Gil Cueva is a 57 y.o. female who presents routine f/u for breast cancer.     Ms. Cueva has no new complaints today.  RA/inflammation labs negative      Mammo neg 8/21/2020. Labs    Family and Social history reviewed and is unchanged from 9/16/2011.       ROS:  Review of Systems   Constitutional: Negative for appetite change, fever and unexpected weight change.   HENT: Negative for mouth sores.    Eyes: Negative for visual disturbance.   Respiratory: Negative for cough and shortness of breath.    Cardiovascular: Negative for chest pain and leg swelling.   Gastrointestinal: Negative for abdominal pain, blood in stool and diarrhea.   Genitourinary: Negative for frequency and hematuria.   Musculoskeletal: Negative for back pain.   Skin: Negative for rash.   Neurological: Negative for headaches.   Hematological: Negative for adenopathy.   Psychiatric/Behavioral: The patient is not nervous/anxious.           Current Outpatient Medications:     ALPRAZolam (XANAX) 0.5 MG tablet, TAKE 1 TABLET(0.5 MG) BY MOUTH THREE TIMES DAILY AS NEEDED FOR ANXIETY, Disp: 90 tablet, Rfl: 5    meloxicam (MOBIC) 15 MG tablet, Take 15 mg by mouth once daily., Disp: , Rfl:     Current Facility-Administered Medications:     cyanocobalamin injection 1,000 mcg, 1,000 mcg, Subcutaneous, Q30 Days, Jona Pretty MD, 1,000 mcg at 02/17/20 1025        Objective:       Physical Examination:     BP (!) 139/91   Pulse 82   Temp 98.3 °F (36.8 °C)   Resp 18   Ht 5' 6" (1.676 m)   Wt 93.4 kg (206 lb)   BMI 33.25 kg/m²     Physical Exam  Constitutional: "       Appearance: She is well-developed.   HENT:      Head: Normocephalic and atraumatic.      Right Ear: External ear normal.      Left Ear: External ear normal.   Eyes:      Conjunctiva/sclera: Conjunctivae normal.      Pupils: Pupils are equal, round, and reactive to light.   Neck:      Thyroid: No thyromegaly.      Trachea: No tracheal deviation.   Cardiovascular:      Rate and Rhythm: Normal rate and regular rhythm.      Heart sounds: Normal heart sounds.   Pulmonary:      Effort: Pulmonary effort is normal.      Breath sounds: Normal breath sounds.   Chest:       Abdominal:      General: Bowel sounds are normal. There is no distension.      Palpations: Abdomen is soft. There is no mass.      Tenderness: There is no abdominal tenderness.   Skin:     Findings: No rash.   Neurological:      Comments: Neuro intact througout   Psychiatric:         Behavior: Behavior normal.         Thought Content: Thought content normal.         Judgment: Judgment normal.         Labs:   No results found for this or any previous visit (from the past 336 hour(s)).  CMP  Sodium   Date Value Ref Range Status   08/25/2021 140 136 - 145 mmol/L Final     Potassium   Date Value Ref Range Status   08/25/2021 4.7 3.5 - 5.1 mmol/L Final     Chloride   Date Value Ref Range Status   08/25/2021 106 95 - 110 mmol/L Final     CO2   Date Value Ref Range Status   08/25/2021 26 23 - 29 mmol/L Final     Glucose   Date Value Ref Range Status   08/25/2021 100 70 - 110 mg/dL Final     BUN   Date Value Ref Range Status   08/25/2021 18 6 - 20 mg/dL Final     Creatinine   Date Value Ref Range Status   08/25/2021 0.8 0.5 - 1.4 mg/dL Final     Calcium   Date Value Ref Range Status   08/25/2021 9.3 8.7 - 10.5 mg/dL Final     Total Protein   Date Value Ref Range Status   08/25/2021 7.5 6.0 - 8.4 g/dL Final     Albumin   Date Value Ref Range Status   08/25/2021 4.4 3.5 - 5.2 g/dL Final     Total Bilirubin   Date Value Ref Range Status   08/25/2021 0.8 0.1 -  1.0 mg/dL Final     Comment:     For infants and newborns, interpretation of results should be based  on gestational age, weight and in agreement with clinical  observations.    Premature Infant recommended reference ranges:  Up to 24 hours.............<8.0 mg/dL  Up to 48 hours............<12.0 mg/dL  3-5 days..................<15.0 mg/dL  6-29 days.................<15.0 mg/dL       Alkaline Phosphatase   Date Value Ref Range Status   08/25/2021 57 55 - 135 U/L Final     AST   Date Value Ref Range Status   08/25/2021 21 10 - 40 U/L Final     ALT   Date Value Ref Range Status   08/25/2021 26 10 - 44 U/L Final     Anion Gap   Date Value Ref Range Status   08/25/2021 8 8 - 16 mmol/L Final     eGFR if    Date Value Ref Range Status   08/25/2021 >60.0 >60 mL/min/1.73 m^2 Final     eGFR if non    Date Value Ref Range Status   08/25/2021 >60.0 >60 mL/min/1.73 m^2 Final     Comment:     Calculation used to obtain the estimated glomerular filtration  rate (eGFR) is the CKD-EPI equation.        No results found for: CEA  No results found for: PSA        Assessment/Plan:     Problem List Items Addressed This Visit     Malignant neoplasm of central portion of left breast in female, estrogen receptor positive (Chronic)     Patient is doing well and appears MARIO.  Exam is negative and she will be due for mammogram in six months.  Will arrange.  Will continue to see her every six months.      Note is made that RA work up was negative after last visit.            Relevant Orders    CBC Auto Differential    Comprehensive Metabolic Panel    Mammo Digital Screening Bilat w/ Nabeel      Other Visit Diagnoses     Encounter for screening mammogram for high-risk patient    -  Primary    Relevant Orders    Mammo Digital Screening Bilat w/ Nabeel          Discussion:     Follow up in about 6 months (around 8/23/2022).      Electronically signed by Jona Arrington

## 2022-02-23 ENCOUNTER — OFFICE VISIT (OUTPATIENT)
Dept: HEMATOLOGY/ONCOLOGY | Facility: CLINIC | Age: 57
End: 2022-02-23
Payer: MEDICARE

## 2022-02-23 VITALS
HEART RATE: 82 BPM | TEMPERATURE: 98 F | WEIGHT: 206 LBS | RESPIRATION RATE: 18 BRPM | HEIGHT: 66 IN | SYSTOLIC BLOOD PRESSURE: 139 MMHG | DIASTOLIC BLOOD PRESSURE: 91 MMHG | BODY MASS INDEX: 33.11 KG/M2

## 2022-02-23 DIAGNOSIS — Z12.31 ENCOUNTER FOR SCREENING MAMMOGRAM FOR HIGH-RISK PATIENT: Primary | ICD-10-CM

## 2022-02-23 DIAGNOSIS — E53.8 B12 DEFICIENCY: Primary | ICD-10-CM

## 2022-02-23 DIAGNOSIS — Z17.0 MALIGNANT NEOPLASM OF CENTRAL PORTION OF LEFT BREAST IN FEMALE, ESTROGEN RECEPTOR POSITIVE: Chronic | ICD-10-CM

## 2022-02-23 DIAGNOSIS — C50.112 MALIGNANT NEOPLASM OF CENTRAL PORTION OF LEFT BREAST IN FEMALE, ESTROGEN RECEPTOR POSITIVE: Chronic | ICD-10-CM

## 2022-02-23 PROCEDURE — 99213 PR OFFICE/OUTPT VISIT, EST, LEVL III, 20-29 MIN: ICD-10-PCS | Mod: S$GLB,,, | Performed by: INTERNAL MEDICINE

## 2022-02-23 PROCEDURE — 99213 OFFICE O/P EST LOW 20 MIN: CPT | Mod: S$GLB,,, | Performed by: INTERNAL MEDICINE

## 2022-02-23 RX ORDER — MELOXICAM 15 MG/1
15 TABLET ORAL DAILY
COMMUNITY
Start: 2022-01-12 | End: 2023-07-05

## 2022-02-23 RX ORDER — CYANOCOBALAMIN 1000 UG/ML
1000 INJECTION, SOLUTION INTRAMUSCULAR; SUBCUTANEOUS ONCE
Qty: 1 ML | Refills: 0 | Status: SHIPPED | OUTPATIENT
Start: 2022-02-23 | End: 2022-02-23

## 2022-02-23 NOTE — ASSESSMENT & PLAN NOTE
Patient is doing well and appears MARIO.  Exam is negative and she will be due for mammogram in six months.  Will arrange.  Will continue to see her every six months.      Note is made that RA work up was negative after last visit.

## 2022-03-08 DIAGNOSIS — M25.561 RIGHT KNEE PAIN: Primary | ICD-10-CM

## 2022-03-18 ENCOUNTER — HOSPITAL ENCOUNTER (OUTPATIENT)
Dept: RADIOLOGY | Facility: HOSPITAL | Age: 57
Discharge: HOME | End: 2022-03-18
Attending: ORTHOPAEDIC SURGERY
Payer: MEDICARE

## 2022-03-18 DIAGNOSIS — M25.561 RIGHT KNEE PAIN: ICD-10-CM

## 2022-03-18 PROCEDURE — 73721 MRI JNT OF LWR EXTRE W/O DYE: CPT | Mod: TC,PO,RT

## 2022-03-21 ENCOUNTER — HOSPITAL ENCOUNTER (OUTPATIENT)
Dept: RADIOLOGY | Facility: HOSPITAL | Age: 57
Discharge: HOME | End: 2022-03-21
Attending: INTERNAL MEDICINE
Payer: MEDICARE

## 2022-03-21 DIAGNOSIS — Z12.31 ENCOUNTER FOR SCREENING MAMMOGRAM FOR HIGH-RISK PATIENT: ICD-10-CM

## 2022-03-21 DIAGNOSIS — C50.112 MALIGNANT NEOPLASM OF CENTRAL PORTION OF LEFT BREAST IN FEMALE, ESTROGEN RECEPTOR POSITIVE: ICD-10-CM

## 2022-03-21 DIAGNOSIS — Z17.0 MALIGNANT NEOPLASM OF CENTRAL PORTION OF LEFT BREAST IN FEMALE, ESTROGEN RECEPTOR POSITIVE: ICD-10-CM

## 2022-03-21 PROCEDURE — 77063 BREAST TOMOSYNTHESIS BI: CPT | Mod: TC,PO

## 2022-03-23 ENCOUNTER — OFFICE VISIT (OUTPATIENT)
Dept: SPINE | Facility: CLINIC | Age: 57
End: 2022-03-23
Payer: MEDICARE

## 2022-03-23 VITALS — RESPIRATION RATE: 18 BRPM | BODY MASS INDEX: 33.11 KG/M2 | WEIGHT: 206 LBS | HEIGHT: 66 IN

## 2022-03-23 DIAGNOSIS — M50.30 DDD (DEGENERATIVE DISC DISEASE), CERVICAL: ICD-10-CM

## 2022-03-23 DIAGNOSIS — M25.50 ARTHRALGIA, UNSPECIFIED JOINT: Primary | ICD-10-CM

## 2022-03-23 PROCEDURE — 99204 PR OFFICE/OUTPT VISIT, NEW, LEVL IV, 45-59 MIN: ICD-10-PCS | Mod: S$GLB,,, | Performed by: PHYSICAL MEDICINE & REHABILITATION

## 2022-03-23 PROCEDURE — 99204 OFFICE O/P NEW MOD 45 MIN: CPT | Mod: S$GLB,,, | Performed by: PHYSICAL MEDICINE & REHABILITATION

## 2022-03-23 NOTE — PROGRESS NOTES
SUBJECTIVE:    Patient ID: Gil Cueva is a 57 y.o. female.    Chief Complaint: Back Pain    This is a 57-year-old woman who sees Dr. Morfin for her primary care.  She has history of breast cancer followed by Dr. Pretty otherwise she denies any chronic major medical problems.  She does have a history of unspecified neck fusion surgery about 20 years ago.  Sounds like that was done for cervical stenosis.  I do not have the details.  She gets occasional neck discomfort and back pain but her main issue is he aching pain in all of the joints on the right side of her body.  This occurs about once per month.  She says she gets a low-grade fever when it occurs.  She is not having any neck or back pain now.  She is not having john radicular arm or leg pain.  I note that she had some screening Rheumatology lab work ordered in August of last year including an R[place] RA ESR and CRP all of which  were negative.        Past Medical History:   Diagnosis Date    Breast cancer      Social History     Socioeconomic History    Marital status:    Tobacco Use    Smoking status: Never Smoker    Smokeless tobacco: Never Used   Substance and Sexual Activity    Alcohol use: Yes    Drug use: No     Social Determinants of Health     Financial Resource Strain: High Risk    Difficulty of Paying Living Expenses: Hard   Food Insecurity: Food Insecurity Present    Worried About Running Out of Food in the Last Year: Sometimes true    Ran Out of Food in the Last Year: Often true   Transportation Needs: No Transportation Needs    Lack of Transportation (Medical): No    Lack of Transportation (Non-Medical): No   Physical Activity: Sufficiently Active    Days of Exercise per Week: 4 days    Minutes of Exercise per Session: 60 min   Stress: Stress Concern Present    Feeling of Stress : Rather much   Social Connections: Unknown    Frequency of Communication with Friends and Family: More than three times a week     "Frequency of Social Gatherings with Friends and Family: Once a week    Active Member of Clubs or Organizations: Yes    Attends Club or Organization Meetings: 1 to 4 times per year    Marital Status:    Housing Stability: Low Risk     Unable to Pay for Housing in the Last Year: No    Number of Places Lived in the Last Year: 1    Unstable Housing in the Last Year: No     Past Surgical History:   Procedure Laterality Date    ANKLE SURGERY Right     2016    BREAST LUMPECTOMY Left 2008    CERVICAL FUSION N/A 20003    c-3, 4, 5    ROTATOR CUFF REPAIR Right 2016    Dr. Perez     Family History   Problem Relation Age of Onset    Breast cancer Maternal Aunt      Vitals:    03/23/22 0941   Resp: 18   Weight: 93.4 kg (206 lb)   Height: 5' 6" (1.676 m)       Review of Systems   Constitutional: Negative for chills, diaphoresis, fatigue, fever and unexpected weight change.   HENT: Negative for trouble swallowing.    Eyes: Negative for visual disturbance.   Respiratory: Negative for shortness of breath.    Cardiovascular: Negative for chest pain.   Gastrointestinal: Negative for abdominal pain, constipation, nausea and vomiting.   Genitourinary: Negative for difficulty urinating.   Musculoskeletal: Negative for arthralgias, back pain, gait problem, joint swelling, myalgias, neck pain and neck stiffness.   Neurological: Negative for dizziness, speech difficulty, weakness, light-headedness, numbness and headaches.          Objective:      Physical Exam  Neurological:      Mental Status: She is alert and oriented to person, place, and time.      Comments: She is awake and in no acute distress  No point tenderness or palpable masses about the cervical spine  Reflexes- +1-+2 reflexes at the following:   C5-Biceps   C6-Brachioradialis   C7-Triceps   L3/4-Patellar   S1-Achilles  Izabel sign is negative bilaterally  Strength testing- 5/5 strength in the following muscle groups:  C5-Elbow flexion  C6-Wrist " extension  C7-Elbow extension  C8-Finger flexion  T1-Finger abduction  L2-Hip flexion  L3-Knee extension  L4-Ankle dorsiflexion  L5-Great toe extension  S1-Ankle plantar flexion    There may be slight swelling of the right wrist.  There is no erythema or increased warmth.  There is no synovitis involving the wrists or hands.             Assessment:       1. Arthralgia, unspecified joint    2. DDD (degenerative disc disease), cervical s/p fusion 2003           Plan:     she has a nonfocal neurological examination and no historical red flags.  Not complaining of neck or low back pain at this time.  I do not think her symptoms are radicular.  Sounds more like arthralgia.  She has no evidence for any active arthritic process.  I recommend a referral to Rheumatology for further evaluation.  She can follow up here as needed      Arthralgia, unspecified joint  -     Ambulatory referral/consult to Rheumatology; Future; Expected date: 03/30/2022    DDD (degenerative disc disease), cervical s/p fusion 2003  -     Ambulatory referral[facility]consult to [facility]

## 2022-07-29 ENCOUNTER — OFFICE VISIT (OUTPATIENT)
Dept: FAMILY MEDICINE | Facility: CLINIC | Age: 57
End: 2022-07-29
Payer: MEDICARE

## 2022-07-29 VITALS
BODY MASS INDEX: 32.59 KG/M2 | RESPIRATION RATE: 14 BRPM | WEIGHT: 202.81 LBS | DIASTOLIC BLOOD PRESSURE: 80 MMHG | HEART RATE: 82 BPM | TEMPERATURE: 99 F | OXYGEN SATURATION: 98 % | HEIGHT: 66 IN | SYSTOLIC BLOOD PRESSURE: 120 MMHG

## 2022-07-29 DIAGNOSIS — H10.33 ACUTE CONJUNCTIVITIS OF BOTH EYES, UNSPECIFIED ACUTE CONJUNCTIVITIS TYPE: ICD-10-CM

## 2022-07-29 DIAGNOSIS — F41.1 GAD (GENERALIZED ANXIETY DISORDER): ICD-10-CM

## 2022-07-29 DIAGNOSIS — E78.5 HYPERLIPIDEMIA, UNSPECIFIED HYPERLIPIDEMIA TYPE: ICD-10-CM

## 2022-07-29 DIAGNOSIS — E66.9 OBESITY (BMI 30.0-34.9): ICD-10-CM

## 2022-07-29 DIAGNOSIS — Z85.3 HISTORY OF BREAST CANCER: ICD-10-CM

## 2022-07-29 DIAGNOSIS — E53.8 B12 DEFICIENCY: ICD-10-CM

## 2022-07-29 DIAGNOSIS — G47.00 INSOMNIA, UNSPECIFIED TYPE: Primary | ICD-10-CM

## 2022-07-29 PROBLEM — E66.811 OBESITY (BMI 30.0-34.9): Status: ACTIVE | Noted: 2022-07-29

## 2022-07-29 PROCEDURE — 99999 PR PBB SHADOW E&M-EST. PATIENT-LVL IV: CPT | Mod: PBBFAC,,, | Performed by: FAMILY MEDICINE

## 2022-07-29 PROCEDURE — 99214 PR OFFICE/OUTPT VISIT, EST, LEVL IV, 30-39 MIN: ICD-10-PCS | Mod: S$PBB,,, | Performed by: FAMILY MEDICINE

## 2022-07-29 PROCEDURE — 99999 PR PBB SHADOW E&M-EST. PATIENT-LVL IV: ICD-10-PCS | Mod: PBBFAC,,, | Performed by: FAMILY MEDICINE

## 2022-07-29 PROCEDURE — 99214 OFFICE O/P EST MOD 30 MIN: CPT | Mod: PBBFAC,PO | Performed by: FAMILY MEDICINE

## 2022-07-29 PROCEDURE — 99214 OFFICE O/P EST MOD 30 MIN: CPT | Mod: S$PBB,,, | Performed by: FAMILY MEDICINE

## 2022-07-29 RX ORDER — ALPRAZOLAM 0.5 MG/1
TABLET ORAL
Qty: 90 TABLET | Refills: 5 | Status: SHIPPED | OUTPATIENT
Start: 2022-07-29 | End: 2023-01-31 | Stop reason: SDUPTHER

## 2022-07-29 NOTE — PROGRESS NOTES
"Subjective:       Patient ID: Gil Cueva is a 57 y.o. female.    Chief Complaint: Follow-up (6mth f/u )    HPI  Review of Systems   Constitutional: Negative for fatigue and unexpected weight change.   Respiratory: Negative for chest tightness and shortness of breath.    Cardiovascular: Negative for chest pain, palpitations and leg swelling.   Gastrointestinal: Negative for abdominal pain.   Musculoskeletal: Negative for arthralgias.   Neurological: Negative for dizziness, syncope, light-headedness and headaches.       Patient Active Problem List   Diagnosis    History of breast cancer    Arthritis    Osteoarthritis    Malignant neoplasm of central portion of left breast in female, estrogen receptor positive    DDD (degenerative disc disease), cervical s/p fusion 2003    Syncope    Arthralgia    Insomnia    ADIA (generalized anxiety disorder)    Obesity (BMI 30.0-34.9)    Hyperlipidemia    B12 deficiency     Patient is here for a chronic conditions follow up.    Chronic insomnia and ADIA controlled with xanax .5 mg tid(see below)    Cervical ddd s/p fusion 2003    Onc Dr. Carter h/o left breast ca 2008 lumpectomy      Card Dr. Brown-has seen for palpitations -ended up related to anxiety.  High stress at  Home. 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 Under care of Dr. William.  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  Extremely resistant to taking anti-depressant meds. Intolerant to several which made her worse     High chol      PAP utd- 5/2019 ago Dr. Alvarado     Declines vaccines     Eye Dr. MYNOR Cifuentes-treats occ red eye and eyelid irritation with gooey d/c with rare limited use of prednisolone eye drops. Use 3-4 times per year for 2-5 days. Usually left eye. Needs refill    GI colonoscopy 2015 + polyps. Recommended 5 year surveillance  Objective:      Physical Exam  Vitals and nursing note reviewed.   Constitutional:       " "Appearance: She is well-developed.   Cardiovascular:      Rate and Rhythm: Normal rate and regular rhythm.      Heart sounds: Normal heart sounds.   Pulmonary:      Effort: Pulmonary effort is normal.      Breath sounds: Normal breath sounds.   Skin:     General: Skin is warm and dry.   Neurological:      Mental Status: She is alert and oriented to person, place, and time.         Assessment:       1. Insomnia, unspecified type    2. ADIA (generalized anxiety disorder)    3. Obesity (BMI 30.0-34.9)    4. History of breast cancer    5. Hyperlipidemia, unspecified hyperlipidemia type    6. B12 deficiency    7. Acute conjunctivitis of both eyes, unspecified acute conjunctivitis type        Plan:         1. Insomnia, unspecified type  Cont current mgmt  - ALPRAZolam (XANAX) 0.5 MG tablet; TAKE 1 TABLET(0.5 MG) BY MOUTH THREE TIMES DAILY AS NEEDED FOR ANXIETY  Dispense: 90 tablet; Refill: 5    2. ADIA (generalized anxiety disorder)  Cont current mgmt  - ALPRAZolam (XANAX) 0.5 MG tablet; TAKE 1 TABLET(0.5 MG) BY MOUTH THREE TIMES DAILY AS NEEDED FOR ANXIETY  Dispense: 90 tablet; Refill: 5    3. Obesity (BMI 30.0-34.9)  Counseled patient on his ideal body weight, health consequences of being obese and current recommendations including weekly exercise and a heart healthy diet.  Current BMI is:Estimated body mass index is 32.74 kg/m² as calculated from the following:    Height as of this encounter: 5' 6" (1.676 m).    Weight as of this encounter: 92 kg (202 lb 13.2 oz)..  Patient is aware that ideal BMI < 25 or Weight in (lb) to have BMI = 25: 154.6.        4. History of breast cancer  Cont heme/onc monitoring    5. Hyperlipidemia, unspecified hyperlipidemia type  I recommend a heart healthy diet rich in fiber, fresh vegetables and fruit and low in saturated fats (fried foods, red meat, etc.).  I also recommend regular exercise including a minimum of 150 minutes of cardio exercise per week and 2-30 minute workouts of strength " training like light weights, yoga, pilates, etc.  You should work toward a body mass index of < 25.        - CBC Auto Differential; Future  - Comprehensive Metabolic Panel; Future  - Lipid Panel; Future    6. B12 deficiency  Normal. Screen and treat as indicated:    - Vitamin B12; Future    7. Acute conjunctivitis of both eyes, unspecified acute conjunctivitis type  Use for rare flares  - prednisoLONE acetate (PRED MILD) 0.12 % ophthalmic suspension; Place 1 drop into both eyes 4 (four) times daily as needed (conjunctivits).  Dispense: 5 mL; Refill: 0        Time spent with patient: 20 minutes    Patient with be reevaluated in 6 months or sooner prn    Greater than 50% of this visit was spent counseling as described in above documentation:Yes

## 2022-08-23 NOTE — PROGRESS NOTES
"                                                         PROGRESS NOTE    Subjective:       Patient ID: Gil Cueva is a 57 y.o. female.    Dx: 8/8/2011, IDCA  Lump/ALND: 8/22/2011  ER 99%, %, Her2 neg  [id number], 8mm tumor.   Low risk oncotype:  Began Lennon 10/3/2011-2/2017    Chief Complaint:  No chief complaint on file.  f/u breast cancer.     History of Present Illness:   Gil Cueva is a 57 y.o. female who presents routine f/u for breast cancer.     Patient is doing well.  No new complaints at this time.      Mammo neg 3/21/2022    Family and Social history reviewed and is unchanged from 9/16/2011.       ROS:  Review of Systems   Constitutional: Negative for appetite change, fever and unexpected weight change.   HENT: Negative for mouth sores.    Eyes: Negative for visual disturbance.   Respiratory: Negative for cough and shortness of breath.    Cardiovascular: Negative for chest pain and leg swelling.   Gastrointestinal: Negative for abdominal pain, blood in stool and diarrhea.   Genitourinary: Negative for frequency and hematuria.   Musculoskeletal: Negative for back pain.   Skin: Negative for rash.   Neurological: Negative for headaches.   Hematological: Negative for adenopathy.   Psychiatric/Behavioral: The patient is not nervous/anxious.           Current Outpatient Medications:     ALPRAZolam (XANAX) 0.5 MG tablet, TAKE 1 TABLET(0.5 MG) BY MOUTH THREE TIMES DAILY AS NEEDED FOR ANXIETY, Disp: 90 tablet, Rfl: 5    meloxicam (MOBIC) 15 MG tablet, Take 15 mg by mouth once daily., Disp: , Rfl:     Current Facility-Administered Medications:     cyanocobalamin injection 1,000 mcg, 1,000 mcg, Subcutaneous, Q30 Days, Jona Pretty MD, 1,000 mcg at 02/17/20 1025        Objective:       Physical Examination:     /76   Pulse 75   Temp 98.4 °F (36.9 °C)   Resp 18   Ht 5' 6" (1.676 m)   Wt 91.2 kg (201 lb)   BMI 32.44 kg/m²     Physical Exam  Constitutional:       Appearance: She is " well-developed.   HENT:      Head: Normocephalic and atraumatic.      Right Ear: External ear normal.      Left Ear: External ear normal.   Eyes:      Conjunctiva/sclera: Conjunctivae normal.      Pupils: Pupils are equal, round, and reactive to light.   Neck:      Thyroid: No thyromegaly.      Trachea: No tracheal deviation.   Cardiovascular:      Rate and Rhythm: Normal rate and regular rhythm.      Heart sounds: Normal heart sounds.   Pulmonary:      Effort: Pulmonary effort is normal.      Breath sounds: Normal breath sounds.   Chest:       Abdominal:      General: Bowel sounds are normal. There is no distension.      Palpations: Abdomen is soft. There is no mass.      Tenderness: There is no abdominal tenderness.   Skin:     Findings: No rash.   Neurological:      Comments: Neuro intact througout   Psychiatric:         Behavior: Behavior normal.         Thought Content: Thought content normal.         Judgment: Judgment normal.         Labs:   No results found for this or any previous visit (from the past 336 hour(s)).  CMP  Sodium   Date Value Ref Range Status   08/25/2021 140 136 - 145 mmol/L Final     Potassium   Date Value Ref Range Status   08/25/2021 4.7 3.5 - 5.1 mmol/L Final     Chloride   Date Value Ref Range Status   08/25/2021 106 95 - 110 mmol/L Final     CO2   Date Value Ref Range Status   08/25/2021 26 23 - 29 mmol/L Final     Glucose   Date Value Ref Range Status   08/25/2021 100 70 - 110 mg/dL Final     BUN   Date Value Ref Range Status   08/25/2021 18 6 - 20 mg/dL Final     Creatinine   Date Value Ref Range Status   08/25/2021 0.8 0.5 - 1.4 mg/dL Final     Calcium   Date Value Ref Range Status   08/25/2021 9.3 8.7 - 10.5 mg/dL Final     Total Protein   Date Value Ref Range Status   08/25/2021 7.5 6.0 - 8.4 g/dL Final     Albumin   Date Value Ref Range Status   08/25/2021 4.4 3.5 - 5.2 g/dL Final     Total Bilirubin   Date Value Ref Range Status   08/25/2021 0.8 0.1 - 1.0 mg/dL Final      Comment:     For infants and newborns, interpretation of results should be based  on gestational age, weight and in agreement with clinical  observations.    Premature Infant recommended reference ranges:  Up to 24 hours.............<8.0 mg/dL  Up to 48 hours............<12.0 mg/dL  3-5 days..................<15.0 mg/dL  6-29 days.................<15.0 mg/dL       Alkaline Phosphatase   Date Value Ref Range Status   08/25/2021 57 55 - 135 U/L Final     AST   Date Value Ref Range Status   08/25/2021 21 10 - 40 U/L Final     ALT   Date Value Ref Range Status   08/25/2021 26 10 - 44 U/L Final     Anion Gap   Date Value Ref Range Status   08/25/2021 8 8 - 16 mmol/L Final     eGFR if    Date Value Ref Range Status   08/25/2021 >60.0 >60 mL/min/1.73 m^2 Final     eGFR if non    Date Value Ref Range Status   08/25/2021 >60.0 >60 mL/min/1.73 m^2 Final     Comment:     Calculation used to obtain the estimated glomerular filtration  rate (eGFR) is the CKD-EPI equation.        No results found for: CEA  No results found for: PSA        Assessment/Plan:     Problem List Items Addressed This Visit     Malignant neoplasm of central portion of left breast in female, estrogen receptor positive (Chronic)     Patient is doing well and appears MARIO.  Her exam is negative and mammogram was good in March of this year.  Will continue to see her every six months and discussed this today.                   Discussion:     Follow up in about 6 months (around 2/24/2023).      Electronically signed by Jona Arrington

## 2022-08-24 ENCOUNTER — OFFICE VISIT (OUTPATIENT)
Dept: HEMATOLOGY/ONCOLOGY | Facility: CLINIC | Age: 57
End: 2022-08-24
Payer: MEDICARE

## 2022-08-24 VITALS
SYSTOLIC BLOOD PRESSURE: 129 MMHG | WEIGHT: 201 LBS | TEMPERATURE: 98 F | DIASTOLIC BLOOD PRESSURE: 76 MMHG | HEART RATE: 75 BPM | BODY MASS INDEX: 32.3 KG/M2 | RESPIRATION RATE: 18 BRPM | HEIGHT: 66 IN

## 2022-08-24 DIAGNOSIS — C50.112 MALIGNANT NEOPLASM OF CENTRAL PORTION OF LEFT BREAST IN FEMALE, ESTROGEN RECEPTOR POSITIVE: Chronic | ICD-10-CM

## 2022-08-24 DIAGNOSIS — Z17.0 MALIGNANT NEOPLASM OF CENTRAL PORTION OF LEFT BREAST IN FEMALE, ESTROGEN RECEPTOR POSITIVE: Chronic | ICD-10-CM

## 2022-08-24 PROCEDURE — 99213 PR OFFICE/OUTPT VISIT, EST, LEVL III, 20-29 MIN: ICD-10-PCS | Mod: S$GLB,,, | Performed by: INTERNAL MEDICINE

## 2022-08-24 PROCEDURE — 99213 OFFICE O/P EST LOW 20 MIN: CPT | Mod: S$GLB,,, | Performed by: INTERNAL MEDICINE

## 2022-08-24 NOTE — ASSESSMENT & PLAN NOTE
Patient is doing well and appears MARIO.  Her exam is negative and mammogram was good in March of this year.  Will continue to see her every six months and discussed this today.

## 2022-09-05 ENCOUNTER — PATIENT MESSAGE (OUTPATIENT)
Dept: FAMILY MEDICINE | Facility: CLINIC | Age: 57
End: 2022-09-05
Payer: MEDICARE

## 2022-09-06 NOTE — TELEPHONE ENCOUNTER
Pilonidal cysts are usually present from birth and if suddenly swelling or becoming tender could indicate an infection. We should see you to be sure what it is.  Can be with me or any of our providers. I will ask staff to call you.

## 2022-09-20 ENCOUNTER — OFFICE VISIT (OUTPATIENT)
Dept: OPHTHALMOLOGY | Facility: CLINIC | Age: 57
End: 2022-09-20
Payer: MEDICARE

## 2022-09-20 DIAGNOSIS — H02.88B MEIBOMIAN GLAND DYSFUNCTION (MGD), BILATERAL, BOTH UPPER AND LOWER LIDS: Primary | ICD-10-CM

## 2022-09-20 DIAGNOSIS — H04.123 DRY EYE SYNDROME, BILATERAL: ICD-10-CM

## 2022-09-20 DIAGNOSIS — H02.88A MEIBOMIAN GLAND DYSFUNCTION (MGD), BILATERAL, BOTH UPPER AND LOWER LIDS: Primary | ICD-10-CM

## 2022-09-20 PROCEDURE — 99999 PR PBB SHADOW E&M-EST. PATIENT-LVL III: CPT | Mod: PBBFAC,,, | Performed by: OPHTHALMOLOGY

## 2022-09-20 PROCEDURE — 99213 OFFICE O/P EST LOW 20 MIN: CPT | Mod: PBBFAC,PO | Performed by: OPHTHALMOLOGY

## 2022-09-20 PROCEDURE — 99204 OFFICE O/P NEW MOD 45 MIN: CPT | Mod: S$PBB,,, | Performed by: OPHTHALMOLOGY

## 2022-09-20 PROCEDURE — 99204 PR OFFICE/OUTPT VISIT, NEW, LEVL IV, 45-59 MIN: ICD-10-PCS | Mod: S$PBB,,, | Performed by: OPHTHALMOLOGY

## 2022-09-20 PROCEDURE — 99999 PR PBB SHADOW E&M-EST. PATIENT-LVL III: ICD-10-PCS | Mod: PBBFAC,,, | Performed by: OPHTHALMOLOGY

## 2022-09-20 NOTE — PATIENT INSTRUCTIONS
What is blepharitis?   -- Blepharitis is inflammation of the eyelids that causes redness and swelling of the lids. The symptoms might get better and then come back. Blepharitis can affect your tear film and lead to dry eyes.    Blepharitis is more common in people who have certain skin conditions, including:  ?Rosacea - This causes redness and raised, red bumps on the cheeks, nose, chin, forehead, or eyelids.  ?Seborrhea - This causes redness, scaly patches, and itching, mostly on the scalp. Dandruff is a mild form of seborrhea.    What are the symptoms of blepharitis? -- The symptoms include:  ?Eyelids that are red, swollen, and itchy  ?A gritty or burning feeling in the eyes  ?Red eyes  ?Crusty, matted eyelashes in the morning  ?Flaking or scaling of the eyelid skin    Is there anything I can do on my own to feel better? -- Yes. You can:  ?Put warm, wet pressure on your eyes - Wet a clean wash cloth with warm (not scalding hot) water and put it over your eyes. When the wash cloth cools, reheat it with warm water and put it back over your eyes. Repeat these steps for 5 minutes, 2 to 4 times a day.  ?Gently rub your eyelids - Do this right after putting warm, wet pressure on your eyes. Use the washcloth or a clean fingertip to gently rub your eyelid in small circles.  ?Wash your eyelids - Use plain warm water or warm water with a drop of baby shampoo on a clean washcloth, gauze pad, or cotton swab. Gently clean any crusty material off the eyelashes and eyelids. Do not rub hard or you can cause more irritation. You can also use over-the-counter eyelid scrubs and pads.    How is blepharitis treated? -- If the treatments you do on your own do not help, your doctor might prescribe:  ?An antibiotic cream or ointment to put on your eyelids  ?Antibiotic pills    What is dry eye?   -- Dry eye happens when your eyes either do not make enough tears or the tears that they make evaporate (go away) too quickly. This causes your  "eyes to feel dry and irritated. The medical term for dry eye is "keratoconjunctivitis sicca."    What causes dry eye?  -- Many people have dry eye, including about one-third of older adults. A few people with dry eye might have another disease, such as Sjögren's syndrome, diabetes, or Parkinson disease. Some medicines can cause dry eye symptoms or make them worse.    What are the symptoms of dry eye?   -- People have different symptoms but the most common ones are eyes that:  ?Feel dry or burn  ?Look red  Other symptoms can include:  ?Being bothered by light  ?Feeling like something is in your eyes  ?Blurry vision  ?Watery eyes  ?Discomfort wearing contact lenses  In some people the symptoms are worse when it is cold or windy or if they are in a dry environment.    How is dry eye treated? -- Artificial tears or lubricant eye drops are the main treatment for dry eye. They can keep the eye moist and help with the symptoms of dry eye. You can buy artificial tears at the drug store or grocery store without a prescription. They come in liquid, gel, or ointments. Your doctor or nurse will help you decide which form is best for you. It is usually best to avoid eye drops that are meant to reduce redness.    Artificial tears help with the symptoms of dry eye, but they do not cure the condition. They work only as long as you keep using them.  Other things you can do to help improve your symptoms are:  ?Try to blink a lot, especially when you are reading or using the computer. This helps keep your eye moist.  ?Avoid excess air conditioning or heating as much as you can. Also avoid sitting directly in the flow of the cold or hot air.  ?Use a humidifier in your bedroom and any other space where you spend a lot of time.  ?Avoid smoke and smoky air  ?Wear protective eyewear when you are outside. Glasses that cover more of your face, or have special shields on the sides, can protect your eyes from wind and dry air.    If your dry " eye does not get better, your doctor can do more tests and might suggest other treatments. These include prescription eye drops or ointments, special glasses or goggles, oral medicines (pills), or minor surgical procedures.

## 2022-09-20 NOTE — PROGRESS NOTES
HPI    Pt presents today due to irritated OS.     States seven years ago she started with a stye on OS lower lid. After 4   years she had it removed.   Complains since then, the eye becomes red and inflammed. She believes she   has clogged oil glands and states the styes keep coming back as well as   continued intermittent flare up of the eye. Complains of eye pain when she   has the flare ups, states pain can get as high as an 8 on a scale of 1-10.       Was given prednisolone but states she does not like to use it because it   gives her headaches.    Here for a second opinion. States she had a full eye exam two weeks ago.     Ocular meds:   Pred QD OS, PRN.   Last edited by Loida Salgado on 9/20/2022  8:11 AM.            Assessment /Plan     For exam results, see Encounter Report.    Meibomian gland dysfunction (MGD), bilateral, both upper and lower lids    Dry eye syndrome, bilateral    1. Meibomian gland dysfunction (MGD), bilateral, both upper and lower lids  OS>OD, with ocular rosacea  Has tried many treatment options in the past  Episodic  Not using Wcs regularly  Discussed doxy course    For now  Warm compresses x 2 daily  Artificial tears, PF up to QID    Consider course of doxy  Rare use of pred, short term, probably okay    2. Dry eye syndrome, bilateral  As above    F/u 4-5 months, DFE

## 2022-10-14 ENCOUNTER — PATIENT MESSAGE (OUTPATIENT)
Dept: OPHTHALMOLOGY | Facility: CLINIC | Age: 57
End: 2022-10-14
Payer: MEDICARE

## 2022-12-27 ENCOUNTER — LAB VISIT (OUTPATIENT)
Dept: LAB | Facility: HOSPITAL | Age: 57
End: 2022-12-27
Attending: FAMILY MEDICINE
Payer: MEDICARE

## 2022-12-27 DIAGNOSIS — E53.8 B12 DEFICIENCY: ICD-10-CM

## 2022-12-27 DIAGNOSIS — E78.5 HYPERLIPIDEMIA, UNSPECIFIED HYPERLIPIDEMIA TYPE: ICD-10-CM

## 2022-12-27 LAB
ALBUMIN SERPL BCP-MCNC: 4.1 G/DL (ref 3.5–5.2)
ALP SERPL-CCNC: 54 U/L (ref 55–135)
ALT SERPL W/O P-5'-P-CCNC: 14 U/L (ref 10–44)
ANION GAP SERPL CALC-SCNC: 7 MMOL/L (ref 8–16)
AST SERPL-CCNC: 16 U/L (ref 10–40)
BASOPHILS # BLD AUTO: 0.03 K/UL (ref 0–0.2)
BASOPHILS NFR BLD: 0.6 % (ref 0–1.9)
BILIRUB SERPL-MCNC: 0.7 MG/DL (ref 0.1–1)
BUN SERPL-MCNC: 16 MG/DL (ref 6–20)
CALCIUM SERPL-MCNC: 9.1 MG/DL (ref 8.7–10.5)
CHLORIDE SERPL-SCNC: 103 MMOL/L (ref 95–110)
CHOLEST SERPL-MCNC: 219 MG/DL (ref 120–199)
CHOLEST/HDLC SERPL: 3.1 {RATIO} (ref 2–5)
CO2 SERPL-SCNC: 27 MMOL/L (ref 23–29)
CREAT SERPL-MCNC: 0.6 MG/DL (ref 0.5–1.4)
DIFFERENTIAL METHOD: NORMAL
EOSINOPHIL # BLD AUTO: 0 K/UL (ref 0–0.5)
EOSINOPHIL NFR BLD: 0.8 % (ref 0–8)
ERYTHROCYTE [DISTWIDTH] IN BLOOD BY AUTOMATED COUNT: 14.2 % (ref 11.5–14.5)
EST. GFR  (NO RACE VARIABLE): >60 ML/MIN/1.73 M^2
GLUCOSE SERPL-MCNC: 100 MG/DL (ref 70–110)
HCT VFR BLD AUTO: 39.4 % (ref 37–48.5)
HDLC SERPL-MCNC: 71 MG/DL (ref 40–75)
HDLC SERPL: 32.4 % (ref 20–50)
HGB BLD-MCNC: 12.6 G/DL (ref 12–16)
IMM GRANULOCYTES # BLD AUTO: 0.01 K/UL (ref 0–0.04)
IMM GRANULOCYTES NFR BLD AUTO: 0.2 % (ref 0–0.5)
LDLC SERPL CALC-MCNC: 138.6 MG/DL (ref 63–159)
LYMPHOCYTES # BLD AUTO: 1.9 K/UL (ref 1–4.8)
LYMPHOCYTES NFR BLD: 39.2 % (ref 18–48)
MCH RBC QN AUTO: 31 PG (ref 27–31)
MCHC RBC AUTO-ENTMCNC: 32 G/DL (ref 32–36)
MCV RBC AUTO: 97 FL (ref 82–98)
MONOCYTES # BLD AUTO: 0.4 K/UL (ref 0.3–1)
MONOCYTES NFR BLD: 7.1 % (ref 4–15)
NEUTROPHILS # BLD AUTO: 2.6 K/UL (ref 1.8–7.7)
NEUTROPHILS NFR BLD: 52.1 % (ref 38–73)
NONHDLC SERPL-MCNC: 148 MG/DL
NRBC BLD-RTO: 0 /100 WBC
PLATELET # BLD AUTO: 345 K/UL (ref 150–450)
PMV BLD AUTO: 10.1 FL (ref 9.2–12.9)
POTASSIUM SERPL-SCNC: 4.6 MMOL/L (ref 3.5–5.1)
PROT SERPL-MCNC: 7.3 G/DL (ref 6–8.4)
RBC # BLD AUTO: 4.06 M/UL (ref 4–5.4)
SODIUM SERPL-SCNC: 137 MMOL/L (ref 136–145)
TRIGL SERPL-MCNC: 47 MG/DL (ref 30–150)
VIT B12 SERPL-MCNC: 358 PG/ML (ref 210–950)
WBC # BLD AUTO: 4.9 K/UL (ref 3.9–12.7)

## 2022-12-27 PROCEDURE — 80053 COMPREHEN METABOLIC PANEL: CPT | Performed by: FAMILY MEDICINE

## 2022-12-27 PROCEDURE — 82607 VITAMIN B-12: CPT | Performed by: FAMILY MEDICINE

## 2022-12-27 PROCEDURE — 36415 COLL VENOUS BLD VENIPUNCTURE: CPT | Performed by: FAMILY MEDICINE

## 2022-12-27 PROCEDURE — 85025 COMPLETE CBC W/AUTO DIFF WBC: CPT | Performed by: FAMILY MEDICINE

## 2022-12-27 PROCEDURE — 80061 LIPID PANEL: CPT | Performed by: FAMILY MEDICINE

## 2023-01-04 ENCOUNTER — TELEPHONE (OUTPATIENT)
Dept: OPHTHALMOLOGY | Facility: CLINIC | Age: 58
End: 2023-01-04
Payer: MEDICARE

## 2023-01-12 ENCOUNTER — TELEPHONE (OUTPATIENT)
Dept: OPHTHALMOLOGY | Facility: CLINIC | Age: 58
End: 2023-01-12
Payer: MEDICARE

## 2023-01-13 ENCOUNTER — PATIENT MESSAGE (OUTPATIENT)
Dept: OPHTHALMOLOGY | Facility: CLINIC | Age: 58
End: 2023-01-13
Payer: MEDICARE

## 2023-01-31 ENCOUNTER — OFFICE VISIT (OUTPATIENT)
Dept: FAMILY MEDICINE | Facility: CLINIC | Age: 58
End: 2023-01-31
Payer: MEDICARE

## 2023-01-31 VITALS
HEIGHT: 65 IN | SYSTOLIC BLOOD PRESSURE: 110 MMHG | DIASTOLIC BLOOD PRESSURE: 80 MMHG | WEIGHT: 190.69 LBS | HEART RATE: 84 BPM | RESPIRATION RATE: 16 BRPM | BODY MASS INDEX: 31.77 KG/M2 | TEMPERATURE: 98 F | OXYGEN SATURATION: 97 %

## 2023-01-31 DIAGNOSIS — G47.00 INSOMNIA, UNSPECIFIED TYPE: ICD-10-CM

## 2023-01-31 DIAGNOSIS — M12.9 ARTHRITIS, MULTIPLE JOINT INVOLVEMENT: ICD-10-CM

## 2023-01-31 DIAGNOSIS — R79.9 ABNORMAL FINDING OF BLOOD CHEMISTRY, UNSPECIFIED: ICD-10-CM

## 2023-01-31 DIAGNOSIS — F41.1 GAD (GENERALIZED ANXIETY DISORDER): ICD-10-CM

## 2023-01-31 DIAGNOSIS — E66.9 OBESITY (BMI 30.0-34.9): ICD-10-CM

## 2023-01-31 DIAGNOSIS — E78.5 HYPERLIPIDEMIA, UNSPECIFIED HYPERLIPIDEMIA TYPE: Primary | ICD-10-CM

## 2023-01-31 DIAGNOSIS — Z85.3 HISTORY OF BREAST CANCER: ICD-10-CM

## 2023-01-31 DIAGNOSIS — Z13.1 DIABETES MELLITUS SCREENING: ICD-10-CM

## 2023-01-31 DIAGNOSIS — K29.70 GASTRITIS, PRESENCE OF BLEEDING UNSPECIFIED, UNSPECIFIED CHRONICITY, UNSPECIFIED GASTRITIS TYPE: ICD-10-CM

## 2023-01-31 PROCEDURE — 1159F PR MEDICATION LIST DOCUMENTED IN MEDICAL RECORD: ICD-10-PCS | Mod: CPTII,S$GLB,, | Performed by: FAMILY MEDICINE

## 2023-01-31 PROCEDURE — 3008F BODY MASS INDEX DOCD: CPT | Mod: CPTII,S$GLB,, | Performed by: FAMILY MEDICINE

## 2023-01-31 PROCEDURE — 3008F PR BODY MASS INDEX (BMI) DOCUMENTED: ICD-10-PCS | Mod: CPTII,S$GLB,, | Performed by: FAMILY MEDICINE

## 2023-01-31 PROCEDURE — 99214 OFFICE O/P EST MOD 30 MIN: CPT | Mod: S$GLB,,, | Performed by: FAMILY MEDICINE

## 2023-01-31 PROCEDURE — 1159F MED LIST DOCD IN RCRD: CPT | Mod: CPTII,S$GLB,, | Performed by: FAMILY MEDICINE

## 2023-01-31 PROCEDURE — 99214 PR OFFICE/OUTPT VISIT, EST, LEVL IV, 30-39 MIN: ICD-10-PCS | Mod: S$GLB,,, | Performed by: FAMILY MEDICINE

## 2023-01-31 PROCEDURE — 99999 PR PBB SHADOW E&M-EST. PATIENT-LVL IV: CPT | Mod: PBBFAC,,, | Performed by: FAMILY MEDICINE

## 2023-01-31 PROCEDURE — 99999 PR PBB SHADOW E&M-EST. PATIENT-LVL IV: ICD-10-PCS | Mod: PBBFAC,,, | Performed by: FAMILY MEDICINE

## 2023-01-31 PROCEDURE — 1160F PR REVIEW ALL MEDS BY PRESCRIBER/CLIN PHARMACIST DOCUMENTED: ICD-10-PCS | Mod: CPTII,S$GLB,, | Performed by: FAMILY MEDICINE

## 2023-01-31 PROCEDURE — 3074F PR MOST RECENT SYSTOLIC BLOOD PRESSURE < 130 MM HG: ICD-10-PCS | Mod: CPTII,S$GLB,, | Performed by: FAMILY MEDICINE

## 2023-01-31 PROCEDURE — 3079F PR MOST RECENT DIASTOLIC BLOOD PRESSURE 80-89 MM HG: ICD-10-PCS | Mod: CPTII,S$GLB,, | Performed by: FAMILY MEDICINE

## 2023-01-31 PROCEDURE — 1160F RVW MEDS BY RX/DR IN RCRD: CPT | Mod: CPTII,S$GLB,, | Performed by: FAMILY MEDICINE

## 2023-01-31 PROCEDURE — 3079F DIAST BP 80-89 MM HG: CPT | Mod: CPTII,S$GLB,, | Performed by: FAMILY MEDICINE

## 2023-01-31 PROCEDURE — 3074F SYST BP LT 130 MM HG: CPT | Mod: CPTII,S$GLB,, | Performed by: FAMILY MEDICINE

## 2023-01-31 RX ORDER — ALPRAZOLAM 0.5 MG/1
TABLET ORAL
Qty: 90 TABLET | Refills: 5 | Status: SHIPPED | OUTPATIENT
Start: 2023-01-31 | End: 2023-07-05

## 2023-01-31 RX ORDER — OMEPRAZOLE 40 MG/1
40 CAPSULE, DELAYED RELEASE ORAL DAILY
Qty: 30 CAPSULE | Refills: 11 | Status: SHIPPED | OUTPATIENT
Start: 2023-01-31 | End: 2023-07-05

## 2023-01-31 NOTE — PROGRESS NOTES
"Subjective:       Patient ID: Gil Cueva is a 57 y.o. female.    Chief Complaint: Follow-up (6mth f/u)    HPI  Review of Systems   Constitutional:  Negative for fatigue and unexpected weight change.   Respiratory:  Negative for chest tightness and shortness of breath.    Cardiovascular:  Negative for chest pain, palpitations and leg swelling.   Gastrointestinal:  Negative for abdominal pain.   Musculoskeletal:  Negative for arthralgias.   Neurological:  Negative for dizziness, syncope, light-headedness and headaches.     Patient Active Problem List   Diagnosis    History of breast cancer    Arthritis    Osteoarthritis    Malignant neoplasm of central portion of left breast in female, estrogen receptor positive    DDD (degenerative disc disease), cervical s/p fusion 2003    Syncope    Arthralgia    Insomnia    ADIA (generalized anxiety disorder)    Obesity (BMI 30.0-34.9)    Hyperlipidemia    B12 deficiency     Patient is here for a chronic conditions follow up.    Reviewed labs 12/2022    Chronic insomnia and ADIA controlled with xanax .5 mg tid(see below)     Cervical ddd s/p fusion 2003     Onc Dr. Carter h/o left breast ca 2008 lumpectomy. Mammo neg 3/22      Card Dr. Brown-has seen for palpitations -ended up related to anxiety.  High stress at  Home. 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 Under care of Dr. William.  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  Extremely resistant to taking anti-depressant meds. Intolerant to several which made her worse     High chol -Your cholesterol is high.  Your 10 year risk of having a heart attack or a stroke is:The 10-year ASCVD risk score (Gagandeep DK, et al., 2019) is: 1.6%    Values used to calculate the score:      Age: 57 years      Sex: Female      Is Non- : No      Diabetic: No      Tobacco smoker: No      Systolic Blood Pressure: 110 mmHg      Is BP treated: No      HDL " Cholesterol: 71 mg/dL      Total Cholesterol: 219 mg/dL           PAP utd- 5/2019 ago Dr. Alvarado     Declines vaccines     Eye Dr. MYNOR Cifuentes-treats occ red eye and eyelid irritation with gooey d/c with rare limited use of prednisolone eye drops. Use 3-4 times per year for 2-5 days. Usually left eye. Needs refill     GI colonoscopy 2015 + polyps. Recommended 5 year surveillance  Objective:      Physical Exam  Vitals and nursing note reviewed.   Constitutional:       Appearance: She is well-developed.   Cardiovascular:      Rate and Rhythm: Normal rate and regular rhythm.      Heart sounds: Normal heart sounds.   Pulmonary:      Effort: Pulmonary effort is normal.      Breath sounds: Normal breath sounds.   Skin:     General: Skin is warm and dry.   Neurological:      Mental Status: She is alert and oriented to person, place, and time.       Assessment:       1. Hyperlipidemia, unspecified hyperlipidemia type    2. Insomnia, unspecified type    3. ADIA (generalized anxiety disorder)    4. Obesity (BMI 30.0-34.9)    5. History of breast cancer    6. Diabetes mellitus screening    7. Arthritis, multiple joint involvement    8. Gastritis, presence of bleeding unspecified, unspecified chronicity, unspecified gastritis type    9. Abnormal finding of blood chemistry, unspecified        Plan:         1. Hyperlipidemia, unspecified hyperlipidemia type  I recommend a heart healthy diet rich in fiber, fresh vegetables and fruit and low in saturated fats (fried foods, red meat, etc.).  I also recommend regular exercise including a minimum of 150 minutes of cardio exercise per week and 2-30 minute workouts of strength training like light weights, yoga, pilates, etc.  You should work toward a body mass index of < 25.      - Comprehensive Metabolic Panel; Future  - Lipid Panel; Future    2. Insomnia, unspecified type  continue  - ALPRAZolam (XANAX) 0.5 MG tablet; TAKE 1 TABLET(0.5 MG) BY MOUTH THREE TIMES DAILY AS NEEDED FOR  "ANXIETY  Dispense: 90 tablet; Refill: 5    3. ADIA (generalized anxiety disorder)  Use as directed  - ALPRAZolam (XANAX) 0.5 MG tablet; TAKE 1 TABLET(0.5 MG) BY MOUTH THREE TIMES DAILY AS NEEDED FOR ANXIETY  Dispense: 90 tablet; Refill: 5    4. Obesity (BMI 30.0-34.9)  Counseled patient on his ideal body weight, health consequences of being obese and current recommendations including weekly exercise and a heart healthy diet.  Current BMI is:Estimated body mass index is 31.73 kg/m² as calculated from the following:    Height as of this encounter: 5' 5" (1.651 m).    Weight as of this encounter: 86.5 kg (190 lb 11.2 oz)..  Patient is aware that ideal BMI < 25 or Weight in (lb) to have BMI = 25: 149.9.      5. History of breast cancer  Cont monitoring    6. Diabetes mellitus screening  Screen and treat as indicated:    - Hemoglobin A1C; Future    7. Arthritis, multiple joint involvement  Cont current mgmt. Use mobic or similar as directed    8. Gastritis, presence of bleeding unspecified, unspecified chronicity, unspecified gastritis type  Add for 3 months then trial off  - omeprazole (PRILOSEC) 40 MG capsule; Take 1 capsule (40 mg total) by mouth once daily.  Dispense: 30 capsule; Refill: 11  Monitor for worsening symptoms and return to clinic or go to the ER if these occur: fever > 100.4, severe abdominal pain, intractable vomiting, bleeding from the rectum or black tarry stools, dehydration, lethargy or other worsening symptoms.    9. Abnormal finding of blood chemistry, unspecified  Screen and treat as indicated:    - Hemoglobin A1C; Future      Time spent with patient: 20 minutes    Patient with be reevaluated in 6 months or sooner prn    Greater than 50% of this visit was spent counseling as described in above documentation:Yes  "

## 2023-03-13 ENCOUNTER — OFFICE VISIT (OUTPATIENT)
Dept: HEMATOLOGY/ONCOLOGY | Facility: CLINIC | Age: 58
End: 2023-03-13
Payer: MEDICARE

## 2023-03-13 VITALS
HEART RATE: 88 BPM | BODY MASS INDEX: 31.82 KG/M2 | HEIGHT: 65 IN | OXYGEN SATURATION: 98 % | RESPIRATION RATE: 18 BRPM | SYSTOLIC BLOOD PRESSURE: 144 MMHG | WEIGHT: 191 LBS | TEMPERATURE: 98 F | DIASTOLIC BLOOD PRESSURE: 75 MMHG

## 2023-03-13 DIAGNOSIS — C50.112 MALIGNANT NEOPLASM OF CENTRAL PORTION OF LEFT BREAST IN FEMALE, ESTROGEN RECEPTOR POSITIVE: Chronic | ICD-10-CM

## 2023-03-13 DIAGNOSIS — Z17.0 MALIGNANT NEOPLASM OF CENTRAL PORTION OF LEFT BREAST IN FEMALE, ESTROGEN RECEPTOR POSITIVE: Chronic | ICD-10-CM

## 2023-03-13 DIAGNOSIS — R51.9 NONINTRACTABLE HEADACHE, UNSPECIFIED CHRONICITY PATTERN, UNSPECIFIED HEADACHE TYPE: ICD-10-CM

## 2023-03-13 DIAGNOSIS — R42 VERTIGO: ICD-10-CM

## 2023-03-13 DIAGNOSIS — Z12.31 ENCOUNTER FOR SCREENING MAMMOGRAM FOR HIGH-RISK PATIENT: Primary | ICD-10-CM

## 2023-03-13 PROCEDURE — 3008F PR BODY MASS INDEX (BMI) DOCUMENTED: ICD-10-PCS | Mod: CPTII,S$GLB,, | Performed by: INTERNAL MEDICINE

## 2023-03-13 PROCEDURE — 3008F BODY MASS INDEX DOCD: CPT | Mod: CPTII,S$GLB,, | Performed by: INTERNAL MEDICINE

## 2023-03-13 PROCEDURE — 3077F PR MOST RECENT SYSTOLIC BLOOD PRESSURE >= 140 MM HG: ICD-10-PCS | Mod: CPTII,S$GLB,, | Performed by: INTERNAL MEDICINE

## 2023-03-13 PROCEDURE — 99214 PR OFFICE/OUTPT VISIT, EST, LEVL IV, 30-39 MIN: ICD-10-PCS | Mod: S$GLB,,, | Performed by: INTERNAL MEDICINE

## 2023-03-13 PROCEDURE — 1159F MED LIST DOCD IN RCRD: CPT | Mod: CPTII,S$GLB,, | Performed by: INTERNAL MEDICINE

## 2023-03-13 PROCEDURE — 3078F PR MOST RECENT DIASTOLIC BLOOD PRESSURE < 80 MM HG: ICD-10-PCS | Mod: CPTII,S$GLB,, | Performed by: INTERNAL MEDICINE

## 2023-03-13 PROCEDURE — 3078F DIAST BP <80 MM HG: CPT | Mod: CPTII,S$GLB,, | Performed by: INTERNAL MEDICINE

## 2023-03-13 PROCEDURE — 3077F SYST BP >= 140 MM HG: CPT | Mod: CPTII,S$GLB,, | Performed by: INTERNAL MEDICINE

## 2023-03-13 PROCEDURE — 99214 OFFICE O/P EST MOD 30 MIN: CPT | Mod: S$GLB,,, | Performed by: INTERNAL MEDICINE

## 2023-03-13 PROCEDURE — 1159F PR MEDICATION LIST DOCUMENTED IN MEDICAL RECORD: ICD-10-PCS | Mod: CPTII,S$GLB,, | Performed by: INTERNAL MEDICINE

## 2023-03-13 RX ORDER — MECLIZINE HYDROCHLORIDE 25 MG/1
25 TABLET ORAL 3 TIMES DAILY PRN
Qty: 60 TABLET | Refills: 2 | Status: SHIPPED | OUTPATIENT
Start: 2023-03-13 | End: 2023-07-05

## 2023-03-13 NOTE — ASSESSMENT & PLAN NOTE
This is a new finding and I feel she needs a few things done here.  Will get MRI of the brain and will place her on meclizine for symptomatic relief.  Her ears are clear and if these studies are negative and this does not improve, will refer her to neurology.

## 2023-03-13 NOTE — ASSESSMENT & PLAN NOTE
Patient is doing well from this standpoint and appears MARIO.  She is due for mammogram this year and will arrange for this to be done.  Will continue 6 month follow up for this.

## 2023-03-13 NOTE — PROGRESS NOTES
PROGRESS NOTE    Subjective:       Patient ID: Gil Cueva is a 58 y.o. female.    Dx: 8/8/2011, IDCA  Lump/ALND: 8/22/2011  ER 99%, %, Her2 neg  [id number], 8mm tumor.   Low risk oncotype:  Began Lennon 10/3/2011-2/2017    Chief Complaint:  No chief complaint on file.  f/u breast cancer.     History of Present Illness:   Gil Cueva is a 58 y.o. female who presents routine f/u for breast cancer.     Ms. Cueva is having problems with vertigo now which brings on anxiety attacks.  She went to PCP who put her on Xanax [facility] on [facility]x for fluid in her ears.      Mammo neg 3/21/2022    Family and Social history reviewed and is unchanged from 9/16/2011.       ROS:  Review of Systems   Constitutional:  Negative for appetite change, fever and unexpected weight change.   HENT:  Negative for mouth sores.    Eyes:  Negative for visual disturbance.   Respiratory:  Negative for cough and shortness of breath.    Cardiovascular:  Negative for chest pain and leg swelling.   Gastrointestinal:  Negative for abdominal pain, blood in stool and diarrhea.   Genitourinary:  Negative for frequency and hematuria.   Musculoskeletal:  Negative for back pain.   Skin:  Negative for rash.   Neurological:  Negative for headaches.   Hematological:  Negative for adenopathy.   Psychiatric/Behavioral:  The patient is not nervous/anxious.         Current Outpatient Medications:     ALPRAZolam (XANAX) 0.5 MG tablet, TAKE 1 TABLET(0.5 MG) BY MOUTH THREE TIMES DAILY AS NEEDED FOR ANXIETY, Disp: 90 tablet, Rfl: 5    meloxicam (MOBIC) 15 MG tablet, Take 15 mg by mouth once daily., Disp: , Rfl:     omeprazole (PRILOSEC) 40 MG capsule, Take 1 capsule (40 mg total) by mouth once daily., Disp: 30 capsule, Rfl: 11    meclizine (ANTIVERT) 25 mg tablet, Take 1 tablet (25 mg total) by mouth 3 (three) times daily as needed., Disp: 60 tablet, Rfl: 2    Current  "Facility-Administered Medications:     cyanocobalamin injection 1,000 mcg, 1,000 mcg, Subcutaneous, Q30 Days, Jona Pretty MD, 1,000 mcg at 02/17/20 1025        Objective:       Physical Examination:     BP (!) 144/75   Pulse 88   Temp 97.9 °F (36.6 °C)   Resp 18   Ht 5' 5" (1.651 m)   Wt 86.6 kg (191 lb)   SpO2 98%   BMI 31.78 kg/m²     Physical Exam  Constitutional:       Appearance: She is well-developed.   HENT:      Head: Normocephalic and atraumatic.      Right Ear: External ear normal.      Left Ear: External ear normal.      Ears:     Eyes:      Conjunctiva/sclera: Conjunctivae normal.      Pupils: Pupils are equal, round, and reactive to light.   Neck:      Thyroid: No thyromegaly.      Trachea: No tracheal deviation.   Cardiovascular:      Rate and Rhythm: Normal rate and regular rhythm.      Heart sounds: Normal heart sounds.   Pulmonary:      Effort: Pulmonary effort is normal.      Breath sounds: Normal breath sounds.   Chest:       Abdominal:      General: Bowel sounds are normal. There is no distension.      Palpations: Abdomen is soft. There is no mass.      Tenderness: There is no abdominal tenderness.   Skin:     Findings: No rash.   Neurological:      Comments: Neuro intact througout   Psychiatric:         Behavior: Behavior normal.         Thought Content: Thought content normal.         Judgment: Judgment normal.       Labs:   No results found for this or any previous visit (from the past 336 hour(s)).  CMP  Sodium   Date Value Ref Range Status   12/27/2022 137 136 - 145 mmol/L Final     Potassium   Date Value Ref Range Status   12/27/2022 4.6 3.5 - 5.1 mmol/L Final     Chloride   Date Value Ref Range Status   12/27/2022 103 95 - 110 mmol/L Final     CO2   Date Value Ref Range Status   12/27/2022 27 23 - 29 mmol/L Final     Glucose   Date Value Ref Range Status   12/27/2022 100 70 - 110 mg/dL Final     BUN   Date Value Ref Range Status   12/27/2022 16 6 - 20 mg/dL Final "     Creatinine   Date Value Ref Range Status   12/27/2022 0.6 0.5 - 1.4 mg/dL Final     Calcium   Date Value Ref Range Status   12/27/2022 9.1 8.7 - 10.5 mg/dL Final     Total Protein   Date Value Ref Range Status   12/27/2022 7.3 6.0 - 8.4 g/dL Final     Albumin   Date Value Ref Range Status   12/27/2022 4.1 3.5 - 5.2 g/dL Final     Total Bilirubin   Date Value Ref Range Status   12/27/2022 0.7 0.1 - 1.0 mg/dL Final     Comment:     For infants and newborns, interpretation of results should be based  on gestational age, weight and in agreement with clinical  observations.    Premature Infant recommended reference ranges:  Up to 24 hours.............<8.0 mg/dL  Up to 48 hours............<12.0 mg/dL  3-5 days..................<15.0 mg/dL  6-29 days.................<15.0 mg/dL       Alkaline Phosphatase   Date Value Ref Range Status   12/27/2022 54 (L) 55 - 135 U/L Final     AST   Date Value Ref Range Status   12/27/2022 16 10 - 40 U/L Final     ALT   Date Value Ref Range Status   12/27/2022 14 10 - 44 U/L Final     Anion Gap   Date Value Ref Range Status   12/27/2022 7 (L) 8 - 16 mmol/L Final     eGFR if    Date Value Ref Range Status   08/25/2021 >60.0 >60 mL/min/1.73 m^2 Final     eGFR if non    Date Value Ref Range Status   08/25/2021 >60.0 >60 mL/min/1.73 m^2 Final     Comment:     Calculation used to obtain the estimated glomerular filtration  rate (eGFR) is the CKD-EPI equation.        No results found for: CEA  No results found for: PSA        Assessment/Plan:     Problem List Items Addressed This Visit       Malignant neoplasm of central portion of left breast in female, estrogen receptor positive (Chronic)     Patient is doing well from this standpoint and appears MARIO.  She is due for mammogram this year and will arrange for this to be done.  Will continue 6 month follow up for this.          Relevant Orders    MRI BRAIN W WO CONTRAST    Vertigo     This is a new finding and I  feel she needs a few things done here.  Will get MRI of the brain and will place her on meclizine for symptomatic relief.  Her ears are clear and if these studies are negative and this does not improve, will refer her to neurology.          Relevant Medications    meclizine (ANTIVERT) 25 mg tablet    Other Relevant Orders    MRI BRAIN W WO CONTRAST     Other Visit Diagnoses       Encounter for screening mammogram for high-risk patient    -  Primary    Relevant Orders    Mammo Digital Screening Bilat w/ Nabeel    MRI BRAIN W WO CONTRAST    Nonintractable headache, unspecified chronicity pattern, unspecified headache type        Relevant Orders    MRI BRAIN W WO CONTRAST          Discussion:     Follow up in about 3 weeks (around 4/3/2023).      Electronically signed by Jona Arrington

## 2023-03-17 ENCOUNTER — TELEPHONE (OUTPATIENT)
Dept: FAMILY MEDICINE | Facility: CLINIC | Age: 58
End: 2023-03-17
Payer: MEDICARE

## 2023-03-17 NOTE — TELEPHONE ENCOUNTER
Pt has a prescription for olprazolam 0.5 [facility] faxed paper over insurance is asking for an alternative clonazepam, diazepam, or lorazepam.

## 2023-03-22 ENCOUNTER — PATIENT MESSAGE (OUTPATIENT)
Dept: FAMILY MEDICINE | Facility: CLINIC | Age: 58
End: 2023-03-22
Payer: MEDICARE

## 2023-03-22 NOTE — TELEPHONE ENCOUNTER
Notify patient that insurance is preferring an alternative.  Closest to what she is taking is lorazepam.  Does she want to switch to that one?

## 2023-03-30 ENCOUNTER — TELEPHONE (OUTPATIENT)
Dept: NEUROSURGERY | Facility: CLINIC | Age: 58
End: 2023-03-30
Payer: MEDICARE

## 2023-03-30 ENCOUNTER — DOCUMENTATION ONLY (OUTPATIENT)
Dept: HEMATOLOGY/ONCOLOGY | Facility: CLINIC | Age: 58
End: 2023-03-30

## 2023-03-30 ENCOUNTER — HOSPITAL ENCOUNTER (OUTPATIENT)
Dept: RADIOLOGY | Facility: HOSPITAL | Age: 58
Discharge: HOME | End: 2023-03-30
Attending: INTERNAL MEDICINE
Payer: MEDICARE

## 2023-03-30 VITALS — HEIGHT: 65 IN | BODY MASS INDEX: 31.81 KG/M2 | WEIGHT: 190.94 LBS

## 2023-03-30 DIAGNOSIS — G93.9 BRAIN LESION: ICD-10-CM

## 2023-03-30 DIAGNOSIS — C50.112 MALIGNANT NEOPLASM OF CENTRAL PORTION OF LEFT BREAST IN FEMALE, ESTROGEN RECEPTOR POSITIVE: Chronic | ICD-10-CM

## 2023-03-30 DIAGNOSIS — Z17.0 MALIGNANT NEOPLASM OF CENTRAL PORTION OF LEFT BREAST IN FEMALE, ESTROGEN RECEPTOR POSITIVE: Chronic | ICD-10-CM

## 2023-03-30 DIAGNOSIS — R51.9 NONINTRACTABLE HEADACHE, UNSPECIFIED CHRONICITY PATTERN, UNSPECIFIED HEADACHE TYPE: ICD-10-CM

## 2023-03-30 DIAGNOSIS — Z12.31 ENCOUNTER FOR SCREENING MAMMOGRAM FOR HIGH-RISK PATIENT: ICD-10-CM

## 2023-03-30 DIAGNOSIS — R42 VERTIGO: ICD-10-CM

## 2023-03-30 DIAGNOSIS — Z17.0 MALIGNANT NEOPLASM OF CENTRAL PORTION OF LEFT BREAST IN FEMALE, ESTROGEN RECEPTOR POSITIVE: Primary | ICD-10-CM

## 2023-03-30 DIAGNOSIS — C50.112 MALIGNANT NEOPLASM OF CENTRAL PORTION OF LEFT BREAST IN FEMALE, ESTROGEN RECEPTOR POSITIVE: Primary | ICD-10-CM

## 2023-03-30 PROCEDURE — A9585 GADOBUTROL INJECTION: HCPCS | Mod: PO | Performed by: INTERNAL MEDICINE

## 2023-03-30 PROCEDURE — 70553 MRI BRAIN STEM W/O & W/DYE: CPT | Mod: TC,PO

## 2023-03-30 PROCEDURE — 77067 SCR MAMMO BI INCL CAD: CPT | Mod: TC,PO

## 2023-03-30 PROCEDURE — 25500020 PHARM REV CODE 255: Mod: PO | Performed by: INTERNAL MEDICINE

## 2023-03-30 RX ORDER — DEXAMETHASONE 4 MG/1
4 TABLET ORAL EVERY 12 HOURS
Qty: 60 TABLET | Refills: 1 | Status: SHIPPED | OUTPATIENT
Start: 2023-03-30 | End: 2023-07-05

## 2023-03-30 RX ORDER — GADOBUTROL 604.72 MG/ML
8.5 INJECTION INTRAVENOUS
Status: COMPLETED | OUTPATIENT
Start: 2023-03-30 | End: 2023-03-30

## 2023-03-30 RX ADMIN — GADOBUTROL 8.5 ML: 604.72 INJECTION INTRAVENOUS at 09:03

## 2023-03-30 NOTE — PROGRESS NOTES
Called and discussed the MRI results with Ms. Anay today.  She has 2 lesions in the james and needs neurosurgery referral.  We have contacted Dr. Muñoz who will actually see her tomorrow.  Will await this visit.

## 2023-03-30 NOTE — PROGRESS NOTES
Spoke with the patient regarding the results of the Brain MRI showing lesions. Discussed with Dr. Pretty patient started on Dex 4 mg BID, referral sent to Neurosurgery and will get labs and PET scan. She verbalized understandings and Dr. Pretty will call her later today to further discuss.

## 2023-03-31 ENCOUNTER — OFFICE VISIT (OUTPATIENT)
Dept: NEUROSURGERY | Facility: CLINIC | Age: 58
End: 2023-03-31
Payer: MEDICARE

## 2023-03-31 ENCOUNTER — PATIENT MESSAGE (OUTPATIENT)
Dept: NEUROSURGERY | Facility: CLINIC | Age: 58
End: 2023-03-31
Payer: MEDICARE

## 2023-03-31 ENCOUNTER — PATIENT MESSAGE (OUTPATIENT)
Dept: HEMATOLOGY/ONCOLOGY | Facility: CLINIC | Age: 58
End: 2023-03-31

## 2023-03-31 DIAGNOSIS — G93.9 BRAIN LESION: ICD-10-CM

## 2023-03-31 PROCEDURE — 1159F MED LIST DOCD IN RCRD: CPT | Mod: CPTII,S$GLB,, | Performed by: NEUROLOGICAL SURGERY

## 2023-03-31 PROCEDURE — 99999 PR PBB SHADOW E&M-EST. PATIENT-LVL I: CPT | Mod: PBBFAC,,, | Performed by: NEUROLOGICAL SURGERY

## 2023-03-31 PROCEDURE — 99204 OFFICE O/P NEW MOD 45 MIN: CPT | Mod: S$GLB,,, | Performed by: NEUROLOGICAL SURGERY

## 2023-03-31 PROCEDURE — 99204 PR OFFICE/OUTPT VISIT, NEW, LEVL IV, 45-59 MIN: ICD-10-PCS | Mod: S$GLB,,, | Performed by: NEUROLOGICAL SURGERY

## 2023-03-31 PROCEDURE — 1159F PR MEDICATION LIST DOCUMENTED IN MEDICAL RECORD: ICD-10-PCS | Mod: CPTII,S$GLB,, | Performed by: NEUROLOGICAL SURGERY

## 2023-03-31 PROCEDURE — 1160F PR REVIEW ALL MEDS BY PRESCRIBER/CLIN PHARMACIST DOCUMENTED: ICD-10-PCS | Mod: CPTII,S$GLB,, | Performed by: NEUROLOGICAL SURGERY

## 2023-03-31 PROCEDURE — 1160F RVW MEDS BY RX/DR IN RCRD: CPT | Mod: CPTII,S$GLB,, | Performed by: NEUROLOGICAL SURGERY

## 2023-03-31 PROCEDURE — 99999 PR PBB SHADOW E&M-EST. PATIENT-LVL I: ICD-10-PCS | Mod: PBBFAC,,, | Performed by: NEUROLOGICAL SURGERY

## 2023-04-02 ENCOUNTER — PATIENT MESSAGE (OUTPATIENT)
Dept: FAMILY MEDICINE | Facility: CLINIC | Age: 58
End: 2023-04-02
Payer: MEDICARE

## 2023-04-02 DIAGNOSIS — F41.1 GAD (GENERALIZED ANXIETY DISORDER): Primary | ICD-10-CM

## 2023-04-03 NOTE — PROGRESS NOTES
Neurosurgery  History & Physical    SUBJECTIVE:     Chief Complaint: Abnormal MRI    History of Present Illness:  This patient is a very pleasant 58-year-old woman with a known history of anxiety.  The patient has had recent vertigo that comes on with the anxiety.  She has a history of breast cancer more than 12 years ago but has been disease-free.  The patient was seen recently for this.  An MRI was ordered.  There were 2 very small areas of enhancement in the james that were concerning for possible metastases.  I have seen the patient today and reviewed those her.    Review of patient's allergies indicates:   Allergen Reactions    Penicillins     Sulfa (sulfonamide antibiotics)        Current Outpatient Medications   Medication Sig Dispense Refill    ALPRAZolam (XANAX) 0.5 MG tablet TAKE 1 TABLET(0.5 MG) BY MOUTH THREE TIMES DAILY AS NEEDED FOR ANXIETY 90 tablet 5    dexAMETHasone (DECADRON) 4 MG Tab Take 1 tablet (4 mg total) by mouth every 12 (twelve) hours. 60 tablet 1    meclizine (ANTIVERT) 25 mg tablet Take 1 tablet (25 mg total) by mouth 3 (three) times daily as needed. 60 tablet 2    meloxicam (MOBIC) 15 MG tablet Take 15 mg by mouth once daily.      omeprazole (PRILOSEC) 40 MG capsule Take 1 capsule (40 mg total) by mouth once daily. 30 capsule 11     Current Facility-Administered Medications   Medication Dose Route Frequency Provider Last Rate Last Admin    cyanocobalamin injection 1,000 mcg  1,000 mcg Subcutaneous Q30 Days Jona Pretty MD   1,000 mcg at 02/17/20 1025       Past Medical History:   Diagnosis Date    Breast cancer      Past Surgical History:   Procedure Laterality Date    ANKLE SURGERY Right     2016    BREAST LUMPECTOMY Left 2008    CERVICAL FUSION N/A 20003    c-3, 4, 5    ROTATOR CUFF REPAIR Right 2016    Dr. Perez     Family History       Problem Relation (Age of Onset)    Breast cancer Maternal Aunt          Social History     Socioeconomic History    Marital status:     Tobacco Use    Smoking status: Never    Smokeless tobacco: Never   Substance and Sexual Activity    Alcohol use: Yes    Drug use: No     Social Determinants of Health     Financial Resource Strain: Medium Risk    Difficulty of Paying Living Expenses: Somewhat hard   Food Insecurity: Unknown    Worried About Running Out of Food in the Last Year: Patient refused    Ran Out of Food in the Last Year: Patient refused   Transportation Needs: No Transportation Needs    Lack of Transportation (Medical): No    Lack of Transportation (Non-Medical): No   Physical Activity: Sufficiently Active    Days of Exercise per Week: 4 days    Minutes of Exercise per Session: 80 min   Stress: Stress Concern Present    Feeling of Stress : Rather much   Social Connections: Unknown    Frequency of Communication with Friends and Family: More than three times a week    Frequency of Social Gatherings with Friends and Family: Once a week    Active Member of Clubs or Organizations: Yes    Attends Club or Organization Meetings: More than 4 times per year    Marital Status:    Housing Stability: Low Risk     Unable to Pay for Housing in the Last Year: No    Number of Places Lived in the Last Year: 1    Unstable Housing in the Last Year: No       Review of Systems   Constitutional: Negative.    HENT: Negative.  Negative for congestion, dental problem, drooling, ear discharge, ear pain and facial swelling.    Neurological:  Positive for dizziness. Negative for tremors, seizures, syncope, facial asymmetry, speech difficulty, weakness, light-headedness, numbness and headaches.     OBJECTIVE:     Vital Signs     There is no height or weight on file to calculate BMI.      Physical Exam:    Constitutional: She appears well-developed and well-nourished.     Musculoskeletal: Gait is abnormal.        Right Upper Extremities: Muscle strength is 5/5.        Left Upper Extremities: Muscle strength is 5/5.       Right Lower Extremities: Muscle strength  is 5/5.        Left Lower Extremities: Muscle strength is 5/5.     Neurological:        Cranial nerves: Cranial nerve(s) II, III, IV, V, VI, VII, VIII, IX, X, XI and XII are intact.       Diagnostic Results:    MRI Brain with and without contrast:  On postcontrast imaging, 5 mm focus of pathologic intra-axial enhancement involving the posterior aspect of the right james (image 7 series 9). Additionally, there is a 4 mm focus of abnormal intra-axial contrast enhancement involving the anterior aspect of the rightward james on image 59 series 12. Aforementioned findings are concerning for intracranial metastasis considering the provided clinical history    ASSESSMENT/PLAN:   This patient has findings of the MRI that are concerning for possible small metastases.  These could also be small capillary telangiectasias or other nonspecific findings.  The patient has a PET scan that is scheduled.  We have worked to move that up.  I would like to see her again to review this afterwards.  The possibilities are to follow this for now versus treating with radiosurgery.  I have talked to the patient about the risks benefits, and alternatives.  The patient and her family understand.        Note dictated with voice recognition software, please excuse any grammatical errors.

## 2023-04-04 RX ORDER — LORAZEPAM 1 MG/1
1 TABLET ORAL EVERY 6 HOURS PRN
Qty: 30 TABLET | Refills: 0 | Status: SHIPPED | OUTPATIENT
Start: 2023-04-04 | End: 2023-07-05

## 2023-04-05 ENCOUNTER — PATIENT MESSAGE (OUTPATIENT)
Dept: FAMILY MEDICINE | Facility: CLINIC | Age: 58
End: 2023-04-05
Payer: MEDICARE

## 2023-04-05 ENCOUNTER — HOSPITAL ENCOUNTER (OUTPATIENT)
Dept: RADIOLOGY | Facility: HOSPITAL | Age: 58
Discharge: HOME | End: 2023-04-05
Attending: NURSE PRACTITIONER
Payer: MEDICARE

## 2023-04-05 ENCOUNTER — TELEPHONE (OUTPATIENT)
Dept: FAMILY MEDICINE | Facility: CLINIC | Age: 58
End: 2023-04-05
Payer: MEDICARE

## 2023-04-05 DIAGNOSIS — G93.9 BRAIN LESION: ICD-10-CM

## 2023-04-05 DIAGNOSIS — Z17.0 MALIGNANT NEOPLASM OF CENTRAL PORTION OF LEFT BREAST IN FEMALE, ESTROGEN RECEPTOR POSITIVE: ICD-10-CM

## 2023-04-05 DIAGNOSIS — C50.112 MALIGNANT NEOPLASM OF CENTRAL PORTION OF LEFT BREAST IN FEMALE, ESTROGEN RECEPTOR POSITIVE: ICD-10-CM

## 2023-04-05 LAB — POCT GLUCOSE: 100 MG/DL (ref 70–110)

## 2023-04-05 PROCEDURE — 78815 PET IMAGE W/CT SKULL-THIGH: CPT | Mod: 26,PS,, | Performed by: RADIOLOGY

## 2023-04-05 PROCEDURE — A9552 F18 FDG: HCPCS

## 2023-04-05 PROCEDURE — 78815 NM PET CT ROUTINE: ICD-10-PCS | Mod: 26,PS,, | Performed by: RADIOLOGY

## 2023-04-05 PROCEDURE — 78815 PET IMAGE W/CT SKULL-THIGH: CPT | Mod: TC,PI

## 2023-04-05 PROCEDURE — A9698 NON-RAD CONTRAST MATERIALNOC: HCPCS | Performed by: NURSE PRACTITIONER

## 2023-04-05 PROCEDURE — 25500020 PHARM REV CODE 255: Performed by: NURSE PRACTITIONER

## 2023-04-05 RX ADMIN — Medication 900 ML: at 06:04

## 2023-04-05 NOTE — TELEPHONE ENCOUNTER
----- Message from Carito Sanders sent at 4/5/2023 11:57 AM CDT -----  [facility]  Type:  Needs Medical Advice    Who Called: [facility] from [facility]    Would the patient rather a call back or a response via [employer]? call  Best Call Back Number:   [id number] - [place] - [id number] AT [facility] [address] [facility] [address]  [facility] [place] [address]  Phone: [phone] Fax: [phone]      Additional Information: pt has questions about rx ALPRAZolam (XANAX) 0.5 MG tablet. Please advise and thank you.

## 2023-04-06 ENCOUNTER — PATIENT MESSAGE (OUTPATIENT)
Dept: HEMATOLOGY/ONCOLOGY | Facility: CLINIC | Age: 58
End: 2023-04-06

## 2023-04-06 NOTE — TELEPHONE ENCOUNTER
"Called pharmacy and informed pharmacist per Dr Morfin    " Doing a trial of lorazepam for longer half life- may replace xanax. Ok to fill  "    Verbalized understanding.   "

## 2023-04-07 ENCOUNTER — PATIENT MESSAGE (OUTPATIENT)
Dept: HEMATOLOGY/ONCOLOGY | Facility: CLINIC | Age: 58
End: 2023-04-07

## 2023-04-10 ENCOUNTER — TELEPHONE (OUTPATIENT)
Dept: NEUROSURGERY | Facility: CLINIC | Age: 58
End: 2023-04-10
Payer: MEDICARE

## 2023-04-11 ENCOUNTER — OFFICE VISIT (OUTPATIENT)
Dept: NEUROSURGERY | Facility: CLINIC | Age: 58
End: 2023-04-11
Payer: MEDICARE

## 2023-04-11 ENCOUNTER — PATIENT MESSAGE (OUTPATIENT)
Dept: NEUROSURGERY | Facility: CLINIC | Age: 58
End: 2023-04-11

## 2023-04-11 VITALS
HEIGHT: 65 IN | BODY MASS INDEX: 31.81 KG/M2 | DIASTOLIC BLOOD PRESSURE: 85 MMHG | SYSTOLIC BLOOD PRESSURE: 133 MMHG | WEIGHT: 190.94 LBS | HEART RATE: 80 BPM

## 2023-04-11 DIAGNOSIS — G93.9 BRAIN LESION: Primary | ICD-10-CM

## 2023-04-11 PROCEDURE — 99214 PR OFFICE/OUTPT VISIT, EST, LEVL IV, 30-39 MIN: ICD-10-PCS | Mod: S$GLB,,, | Performed by: NEUROLOGICAL SURGERY

## 2023-04-11 PROCEDURE — 3079F DIAST BP 80-89 MM HG: CPT | Mod: CPTII,S$GLB,, | Performed by: NEUROLOGICAL SURGERY

## 2023-04-11 PROCEDURE — 3008F BODY MASS INDEX DOCD: CPT | Mod: CPTII,S$GLB,, | Performed by: NEUROLOGICAL SURGERY

## 2023-04-11 PROCEDURE — 1160F PR REVIEW ALL MEDS BY PRESCRIBER/CLIN PHARMACIST DOCUMENTED: ICD-10-PCS | Mod: CPTII,S$GLB,, | Performed by: NEUROLOGICAL SURGERY

## 2023-04-11 PROCEDURE — 99999 PR PBB SHADOW E&M-EST. PATIENT-LVL III: CPT | Mod: PBBFAC,,, | Performed by: NEUROLOGICAL SURGERY

## 2023-04-11 PROCEDURE — 3008F PR BODY MASS INDEX (BMI) DOCUMENTED: ICD-10-PCS | Mod: CPTII,S$GLB,, | Performed by: NEUROLOGICAL SURGERY

## 2023-04-11 PROCEDURE — 99999 PR PBB SHADOW E&M-EST. PATIENT-LVL III: ICD-10-PCS | Mod: PBBFAC,,, | Performed by: NEUROLOGICAL SURGERY

## 2023-04-11 PROCEDURE — 1160F RVW MEDS BY RX/DR IN RCRD: CPT | Mod: CPTII,S$GLB,, | Performed by: NEUROLOGICAL SURGERY

## 2023-04-11 PROCEDURE — 99214 OFFICE O/P EST MOD 30 MIN: CPT | Mod: S$GLB,,, | Performed by: NEUROLOGICAL SURGERY

## 2023-04-11 PROCEDURE — 3079F PR MOST RECENT DIASTOLIC BLOOD PRESSURE 80-89 MM HG: ICD-10-PCS | Mod: CPTII,S$GLB,, | Performed by: NEUROLOGICAL SURGERY

## 2023-04-11 PROCEDURE — 3075F PR MOST RECENT SYSTOLIC BLOOD PRESS GE 130-139MM HG: ICD-10-PCS | Mod: CPTII,S$GLB,, | Performed by: NEUROLOGICAL SURGERY

## 2023-04-11 PROCEDURE — 1159F PR MEDICATION LIST DOCUMENTED IN MEDICAL RECORD: ICD-10-PCS | Mod: CPTII,S$GLB,, | Performed by: NEUROLOGICAL SURGERY

## 2023-04-11 PROCEDURE — 3075F SYST BP GE 130 - 139MM HG: CPT | Mod: CPTII,S$GLB,, | Performed by: NEUROLOGICAL SURGERY

## 2023-04-11 PROCEDURE — 1159F MED LIST DOCD IN RCRD: CPT | Mod: CPTII,S$GLB,, | Performed by: NEUROLOGICAL SURGERY

## 2023-04-11 NOTE — PATIENT INSTRUCTIONS
I have personally reviewed the MRI brain with the pt which shows two subcentimeter enhancing lesions within the rightward aspect of the james concerning for intracranial metastasis. Negative for acute ischemia or acute intracranial pathology. Mild white matter microangiopathic changes.    I have discussed the following treatment options with the patient:  Observation  GSRS    I will schedule the patient for MRI perfusion and spectroscopy and follow up thereafter.

## 2023-04-11 NOTE — PROGRESS NOTES
Subjective:   I, Sabina Vee, attest that this documentation has been prepared under the direction and in the presence of John Muñoz MD.     Patient ID: Gil Cueva is a 58 y.o. female     Chief Complaint: No chief complaint on file.      HPI  Ms. Gil Cueva is a 58 y.o. woman with a known history of anxiety, who presents today for follow up after PET done on 4/5/2023. This is a patient who has had recent vertigo that comes on with the anxiety.  She has a history of breast cancer more than 12 years ago but has been disease-free since.  The patient was seen recently for vertigo. An MRI was ordered revealing 2 very small areas of enhancement in the james that were concerning for possible metastases. Today the pt is here to discuss her PET scan results, which show no evidence of hypermetabolic tumor. She is relieved to hear this. Although she reports that she has been more anxious as she has had a recent sudden death in the family.    Review of Systems   Constitutional:  Negative for activity change, appetite change, fatigue, fever and unexpected weight change.   HENT:  Negative for facial swelling.    Eyes: Negative.    Respiratory: Negative.     Cardiovascular: Negative.    Gastrointestinal:  Negative for diarrhea, nausea and vomiting.   Endocrine: Negative.    Genitourinary: Negative.    Musculoskeletal:  Negative for back pain, joint swelling, myalgias and neck pain.   Neurological:  Negative for dizziness, seizures, weakness, numbness and headaches.   Psychiatric/Behavioral:  The patient is nervous/anxious.       Past Medical History:   Diagnosis Date    Breast cancer        Objective:      Vitals:    04/11/23 1022   BP: 133/85   Pulse: 80      Physical Exam  Constitutional:       General: She is not in acute distress.     Appearance: Normal appearance.   HENT:      Head: Normocephalic and atraumatic.   Pulmonary:      Effort: Pulmonary effort is normal.   Musculoskeletal:      Cervical back: Neck  supple.   Neurological:      Mental Status: She is alert and oriented to person, place, and time.      GCS: GCS eye subscore is 4. GCS verbal subscore is 5. GCS motor subscore is 6.      Cranial Nerves: No cranial nerve deficit.       IMAGING:  MRI Brain W WO Contrast (3/30/2023):  Two subcentimeter enhancing lesions within the rightward aspect of the james concerning for intracranial metastasis. Negative for acute ischemia or acute intracranial pathology. Mild white matter microangiopathic changes.    I have personally reviewed the images with the pt.      I, Dr. John Muñoz, personally performed the services described in this documentation. All medical record entries made by the scribe, Sabina Vee, were at my direction and in my presence.  I have reviewed the chart and agree that the record reflects my personal performance and is accurate and complete. John Muñoz MD. 04/11/2023    Assessment:       1. Brain lesion         Plan:   I have personally reviewed the MRI brain with the pt which shows two subcentimeter enhancing lesions within the rightward aspect of the james concerning for intracranial metastasis. Negative for acute ischemia or acute intracranial pathology. Mild white matter microangiopathic changes.    I have discussed the following treatment options with the patient:  Observation  GSRS    I will schedule the patient for MRI perfusion and spectroscopy and follow up thereafter.

## 2023-04-17 ENCOUNTER — PATIENT MESSAGE (OUTPATIENT)
Dept: NEUROSURGERY | Facility: CLINIC | Age: 58
End: 2023-04-17
Payer: MEDICARE

## 2023-05-02 ENCOUNTER — PATIENT MESSAGE (OUTPATIENT)
Dept: NEUROSURGERY | Facility: CLINIC | Age: 58
End: 2023-05-02

## 2023-05-02 ENCOUNTER — HOSPITAL ENCOUNTER (OUTPATIENT)
Dept: RADIOLOGY | Facility: HOSPITAL | Age: 58
Discharge: HOME | End: 2023-05-02
Attending: NEUROLOGICAL SURGERY
Payer: MEDICARE

## 2023-05-02 ENCOUNTER — TELEPHONE (OUTPATIENT)
Dept: NEUROSURGERY | Facility: CLINIC | Age: 58
End: 2023-05-02
Payer: MEDICARE

## 2023-05-02 ENCOUNTER — OFFICE VISIT (OUTPATIENT)
Dept: NEUROSURGERY | Facility: CLINIC | Age: 58
End: 2023-05-02
Payer: MEDICARE

## 2023-05-02 VITALS — SYSTOLIC BLOOD PRESSURE: 133 MMHG | DIASTOLIC BLOOD PRESSURE: 85 MMHG | TEMPERATURE: 98 F | HEART RATE: 81 BPM

## 2023-05-02 DIAGNOSIS — G93.9 BRAIN LESION: Primary | ICD-10-CM

## 2023-05-02 DIAGNOSIS — G93.9 BRAIN LESION: ICD-10-CM

## 2023-05-02 PROCEDURE — 3075F PR MOST RECENT SYSTOLIC BLOOD PRESS GE 130-139MM HG: ICD-10-PCS | Mod: CPTII,S$GLB,, | Performed by: NEUROLOGICAL SURGERY

## 2023-05-02 PROCEDURE — 70553 MRI BRAIN STEM W/O & W/DYE: CPT | Mod: 26,,, | Performed by: RADIOLOGY

## 2023-05-02 PROCEDURE — 99214 OFFICE O/P EST MOD 30 MIN: CPT | Mod: S$GLB,,, | Performed by: NEUROLOGICAL SURGERY

## 2023-05-02 PROCEDURE — 1160F PR REVIEW ALL MEDS BY PRESCRIBER/CLIN PHARMACIST DOCUMENTED: ICD-10-PCS | Mod: CPTII,S$GLB,, | Performed by: NEUROLOGICAL SURGERY

## 2023-05-02 PROCEDURE — 1160F RVW MEDS BY RX/DR IN RCRD: CPT | Mod: CPTII,S$GLB,, | Performed by: NEUROLOGICAL SURGERY

## 2023-05-02 PROCEDURE — 70553 MRI BRAIN (TUMOR WITH PERFUSION) W W/O CONTRAST (XPD): ICD-10-PCS | Mod: 26,,, | Performed by: RADIOLOGY

## 2023-05-02 PROCEDURE — 76390 MR SPECTROSCOPY: CPT | Mod: TC

## 2023-05-02 PROCEDURE — 99999 PR PBB SHADOW E&M-EST. PATIENT-LVL III: CPT | Mod: PBBFAC,,, | Performed by: NEUROLOGICAL SURGERY

## 2023-05-02 PROCEDURE — 3075F SYST BP GE 130 - 139MM HG: CPT | Mod: CPTII,S$GLB,, | Performed by: NEUROLOGICAL SURGERY

## 2023-05-02 PROCEDURE — 70553 MRI BRAIN STEM W/O & W/DYE: CPT | Mod: TC

## 2023-05-02 PROCEDURE — 99999 PR PBB SHADOW E&M-EST. PATIENT-LVL III: ICD-10-PCS | Mod: PBBFAC,,, | Performed by: NEUROLOGICAL SURGERY

## 2023-05-02 PROCEDURE — A9585 GADOBUTROL INJECTION: HCPCS | Performed by: NEUROLOGICAL SURGERY

## 2023-05-02 PROCEDURE — 3079F PR MOST RECENT DIASTOLIC BLOOD PRESSURE 80-89 MM HG: ICD-10-PCS | Mod: CPTII,S$GLB,, | Performed by: NEUROLOGICAL SURGERY

## 2023-05-02 PROCEDURE — 76390 MR SPECTROSCOPY: CPT | Mod: 26,,, | Performed by: RADIOLOGY

## 2023-05-02 PROCEDURE — 1159F PR MEDICATION LIST DOCUMENTED IN MEDICAL RECORD: ICD-10-PCS | Mod: CPTII,S$GLB,, | Performed by: NEUROLOGICAL SURGERY

## 2023-05-02 PROCEDURE — 3079F DIAST BP 80-89 MM HG: CPT | Mod: CPTII,S$GLB,, | Performed by: NEUROLOGICAL SURGERY

## 2023-05-02 PROCEDURE — 25500020 PHARM REV CODE 255: Performed by: NEUROLOGICAL SURGERY

## 2023-05-02 PROCEDURE — 99214 PR OFFICE/OUTPT VISIT, EST, LEVL IV, 30-39 MIN: ICD-10-PCS | Mod: S$GLB,,, | Performed by: NEUROLOGICAL SURGERY

## 2023-05-02 PROCEDURE — 1159F MED LIST DOCD IN RCRD: CPT | Mod: CPTII,S$GLB,, | Performed by: NEUROLOGICAL SURGERY

## 2023-05-02 PROCEDURE — 76390 MRI SPECTROSCOPY: ICD-10-PCS | Mod: 26,,, | Performed by: RADIOLOGY

## 2023-05-02 RX ORDER — GADOBUTROL 604.72 MG/ML
9 INJECTION INTRAVENOUS
Status: COMPLETED | OUTPATIENT
Start: 2023-05-02 | End: 2023-05-02

## 2023-05-02 RX ADMIN — GADOBUTROL 9 ML: 604.72 INJECTION INTRAVENOUS at 06:05

## 2023-05-02 NOTE — PATIENT INSTRUCTIONS
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 No evidence of metastatic disease.    I will schedule the patient for 6 month follow up with MRI brain.

## 2023-05-02 NOTE — PROGRESS NOTES
Subjective:   I, Sabina Vee, attest that this documentation has been prepared under the direction and in the presence of John Muñoz MD.     Patient ID: Gil Cueva is a 58 y.o. female     Chief Complaint: No chief complaint on file.      HPI  Ms. Gil Cueva is a 58 y.o. woman with a known history of anxiety and breast cancer, who presents today for follow up of MRI (tumor with perfusion) and spectroscopy. This is a patient who has had recent vertigo that comes on with anxiety.  She has a history of breast cancer more than 12 years ago but has been disease-free since. An MRI was ordered revealing 2 very small areas of enhancement in the james that were concerning for possible metastases. At the time of our last clinic visit on 4/11/2023, the pt is here to discuss her PET scan results, which show no evidence of hypermetabolic tumor. She is relieved to hear this. Although she reports that she has been more anxious as she has had a recent sudden death in the family.    Today the pt reports she is doing well in the interim. She has no complaints or concerns at this time. She is anxious for her visit today. I reviewed the images with the pt that reveals no evidence of metastatic disease. There is some susceptibility however I believe this to be capillary telangiectasia.    Review of Systems   Constitutional:  Negative for activity change, appetite change, fatigue, fever and unexpected weight change.   HENT:  Negative for facial swelling.    Eyes: Negative.    Respiratory: Negative.     Cardiovascular: Negative.    Gastrointestinal:  Negative for diarrhea, nausea and vomiting.   Endocrine: Negative.    Genitourinary: Negative.    Musculoskeletal:  Negative for back pain, joint swelling, myalgias and neck pain.   Neurological:  Negative for dizziness, seizures, weakness, numbness and headaches.   Psychiatric/Behavioral: Negative.        Past Medical History:   Diagnosis Date    Breast cancer         Objective:      Vitals:    05/02/23 0858   BP: 133/85   Pulse: 81   Temp: 97.6 °F (36.4 °C)      Physical Exam  Constitutional:       General: She is not in acute distress.     Appearance: Normal appearance.   HENT:      Head: Normocephalic and atraumatic.   Pulmonary:      Effort: Pulmonary effort is normal.   Musculoskeletal:      Cervical back: Neck supple.   Neurological:      Mental Status: She is alert and oriented to person, place, and time.      GCS: GCS eye subscore is 4. GCS verbal subscore is 5. GCS motor subscore is 6.      Cranial Nerves: No cranial nerve deficit.       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 No evidence of metastatic disease.      I have personally reviewed the images with the pt.      I, Dr. John Muñoz, personally performed the services described in this documentation. All medical record entries made by the scribe, Sabina Vee, were at my direction and in my presence.  I have reviewed the chart and agree that the record reflects my personal performance and is accurate and complete. John Muñoz MD. 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 No evidence of metastatic disease.    I will schedule the patient for 6 month follow up with MRI brain.

## 2023-05-10 ENCOUNTER — PES CALL (OUTPATIENT)
Dept: ADMINISTRATIVE | Facility: CLINIC | Age: 58
End: 2023-05-10
Payer: MEDICARE

## 2023-05-22 ENCOUNTER — PATIENT OUTREACH (OUTPATIENT)
Dept: ADMINISTRATIVE | Facility: HOSPITAL | Age: 58
End: 2023-05-22
Payer: MEDICARE

## 2023-05-22 ENCOUNTER — PATIENT MESSAGE (OUTPATIENT)
Dept: ADMINISTRATIVE | Facility: HOSPITAL | Age: 58
End: 2023-05-22
Payer: MEDICARE

## 2023-05-22 NOTE — PROGRESS NOTES
Population Health Chart Review & Patient Outreach Details:     Reason for Outreach Encounter:     [x]  Non-Compliant Report   []  Payor Report (Hum[employer], PHN, BCBS, MSSP, MCIP, UHC, etc.)   []  Pre-Visit Chart Review     Updates Requested / Reviewed:     [x]  Care Everywhere    []     [x]  External Sources ([facility], [facility], DIS, etc.)   [x]  Care Team Updated    Patient Outreach Method:    []  Telephone Outreach Completed   [] Successful   [] Left Voicemail   [] Unable to Contact (wrong number, no voicemail)  []  [employer] Portal Outreach Sent  []  Letter Outreach Mailed  []  Fax Sent for External Records  []  External Records Upload    Health Maintenance Topics Addressed and Outreach Outcomes / Actions Taken:        []      Breast Cancer Screening []  Mammo Scheduled      []  External Records Requested     []  Added Reminder to Complete to Upcoming Primary Care Appt Notes     []  Patient Declined     []  Patient Will Call Back to Schedule     []  Patient Will Schedule with External Provider / Order Routed if Applicable             [x]       Cervical Cancer Screening []  Pap Scheduled      []  External Records Requested     []  Added Reminder to Complete to Upcoming Primary Care Appt Notes     []  Patient Declined     []  Patient Will Call Back to Schedule     []  Patient Will Schedule with External Provider               [x]          Colorectal Cancer Screening []  Colonoscopy Case Request or Referral Placed     []  External Records Requested     []  Added Reminder to Complete to Upcoming Primary Care Appt Notes     []  Patient Declined     []  Patient Will Call Back to Schedule     []  Patient Will Schedule with External Provider     []  Fit Kit Mailed (add the SmartPhrase under additional notes)     []  Reminded Patient to Complete Home Test             []      Diabetic Eye Exam []  Eye Camera Scheduled or Optometry Referral Placed     []  External Records Requested     []  Added Reminder to Complete to  Upcoming Primary Care Appt Notes     []  Patient Declined     []  Patient Will Call Back to Schedule     []  Patient Will Schedule with External Provider             []      Blood Pressure Control []  Primary Care Follow Up Visit Scheduled     []  Remote Blood Pressure Reading Captured     []  Added Reminder to Complete to Upcoming Primary Care Appt Notes     []  Patient Declined     []  Patient Will Call Back / Patient Will Send Portal Message with Reading     []  Patient Will Call Back to Schedule Provider Visit             []       HbA1c & Other Labs []  Lab Appt Scheduled for Due Labs     []  Primary Care Follow Up Visit Scheduled      []  Reminded Patient to Complete Home Test     []  Added Reminder to Complete to Upcoming Primary Care Appt Notes     []  Patient Declined     []  Patient Will Call Back to Schedule     []  Patient Will Schedule with External Provider / Order Routed if Applicable           []    Schedule Primary Care Appt []  Primary Care Appt Scheduled     []  Patient Declined     []  Patient Will Call Back to Schedule     []  Pt Established with External Provider & Updated Care Team             []      Medication Adherence []  Primary Care Appointment Scheduled     []  Added Reminder to Upcoming Primary Care Appt Notes     []  Patient Reminded to  Prescription     []  Patient Declined, Provider Notified if Needed     []  Sent Provider Message to Review and/or Add Exclusion to Problem List             []      Osteoporosis Screening []  DXA Appointment Scheduled     []  External Records Requested     []  Added Reminder to Complete to Upcoming Primary Care Appt Notes     []  Patient Declined     []  Patient Will Call Back to Schedule     []  Patient Will Schedule with External Provider / Order Routed if Applicable     Additional Care Coordinator Notes:         Further Action Needed If Patient Returns Outreach:

## 2023-06-08 ENCOUNTER — PATIENT MESSAGE (OUTPATIENT)
Dept: HEMATOLOGY/ONCOLOGY | Facility: CLINIC | Age: 58
End: 2023-06-08

## 2023-07-05 ENCOUNTER — OFFICE VISIT (OUTPATIENT)
Dept: HOME HEALTH SERVICES | Facility: CLINIC | Age: 58
End: 2023-07-05
Payer: MEDICARE

## 2023-07-05 VITALS
DIASTOLIC BLOOD PRESSURE: 90 MMHG | OXYGEN SATURATION: 99 % | SYSTOLIC BLOOD PRESSURE: 125 MMHG | HEART RATE: 86 BPM | WEIGHT: 190 LBS | BODY MASS INDEX: 31.62 KG/M2

## 2023-07-05 DIAGNOSIS — I70.0 AORTIC ATHEROSCLEROSIS: ICD-10-CM

## 2023-07-05 DIAGNOSIS — R42 VERTIGO: ICD-10-CM

## 2023-07-05 DIAGNOSIS — F41.1 GAD (GENERALIZED ANXIETY DISORDER): ICD-10-CM

## 2023-07-05 DIAGNOSIS — M50.30 DDD (DEGENERATIVE DISC DISEASE), CERVICAL: ICD-10-CM

## 2023-07-05 DIAGNOSIS — G47.00 INSOMNIA, UNSPECIFIED TYPE: ICD-10-CM

## 2023-07-05 DIAGNOSIS — Z85.3 HISTORY OF BREAST CANCER: ICD-10-CM

## 2023-07-05 DIAGNOSIS — Z00.00 ENCOUNTER FOR PREVENTIVE HEALTH EXAMINATION: Primary | ICD-10-CM

## 2023-07-05 DIAGNOSIS — E66.9 OBESITY (BMI 30.0-34.9): ICD-10-CM

## 2023-07-05 DIAGNOSIS — G93.9 BRAIN LESION: ICD-10-CM

## 2023-07-05 DIAGNOSIS — E78.5 HYPERLIPIDEMIA, UNSPECIFIED HYPERLIPIDEMIA TYPE: ICD-10-CM

## 2023-07-05 PROCEDURE — 3080F DIAST BP >= 90 MM HG: CPT | Mod: CPTII,S$GLB,, | Performed by: NURSE PRACTITIONER

## 2023-07-05 PROCEDURE — 1159F MED LIST DOCD IN RCRD: CPT | Mod: CPTII,S$GLB,, | Performed by: NURSE PRACTITIONER

## 2023-07-05 PROCEDURE — 3080F PR MOST RECENT DIASTOLIC BLOOD PRESSURE >= 90 MM HG: ICD-10-PCS | Mod: CPTII,S$GLB,, | Performed by: NURSE PRACTITIONER

## 2023-07-05 PROCEDURE — 3074F PR MOST RECENT SYSTOLIC BLOOD PRESSURE < 130 MM HG: ICD-10-PCS | Mod: CPTII,S$GLB,, | Performed by: NURSE PRACTITIONER

## 2023-07-05 PROCEDURE — 1160F PR REVIEW ALL MEDS BY PRESCRIBER/CLIN PHARMACIST DOCUMENTED: ICD-10-PCS | Mod: CPTII,S$GLB,, | Performed by: NURSE PRACTITIONER

## 2023-07-05 PROCEDURE — G0439 PR MEDICARE ANNUAL WELLNESS SUBSEQUENT VISIT: ICD-10-PCS | Mod: S$GLB,,, | Performed by: NURSE PRACTITIONER

## 2023-07-05 PROCEDURE — 3008F BODY MASS INDEX DOCD: CPT | Mod: CPTII,S$GLB,, | Performed by: NURSE PRACTITIONER

## 2023-07-05 PROCEDURE — 1159F PR MEDICATION LIST DOCUMENTED IN MEDICAL RECORD: ICD-10-PCS | Mod: CPTII,S$GLB,, | Performed by: NURSE PRACTITIONER

## 2023-07-05 PROCEDURE — 3008F PR BODY MASS INDEX (BMI) DOCUMENTED: ICD-10-PCS | Mod: CPTII,S$GLB,, | Performed by: NURSE PRACTITIONER

## 2023-07-05 PROCEDURE — 3074F SYST BP LT 130 MM HG: CPT | Mod: CPTII,S$GLB,, | Performed by: NURSE PRACTITIONER

## 2023-07-05 PROCEDURE — G0439 PPPS, SUBSEQ VISIT: HCPCS | Mod: S$GLB,,, | Performed by: NURSE PRACTITIONER

## 2023-07-05 PROCEDURE — 1160F RVW MEDS BY RX/DR IN RCRD: CPT | Mod: CPTII,S$GLB,, | Performed by: NURSE PRACTITIONER

## 2023-07-05 RX ORDER — IBUPROFEN 200 MG
200 TABLET ORAL EVERY 6 HOURS PRN
COMMUNITY

## 2023-07-05 NOTE — PATIENT INSTRUCTIONS
Counseling and Referral of Other Preventative  (Italic type indicates deductible and co-insurance are waived)    Patient Name: Gil Cueva  Today's Date: 7/5/2023    Health Maintenance       Date Due Completion Date    HIV Screening Never done ---    High Dose Statin Never done ---    Hemoglobin A1c (Diabetic Prevention Screening) Never done ---    Colorectal Cancer Screening 11/17/2020 11/17/2015    Cervical Cancer Screening 04/30/2023 4/30/2020    TETANUS VACCINE 01/31/2024 (Originally 2/3/1983) ---    Shingles Vaccine (1 of 2) 01/31/2024 (Originally 2/3/1984) ---    COVID-19 Vaccine (3 - Pfizer series) 01/31/2024 (Originally 11/4/2021) 9/9/2021    Mammogram 03/30/2024 3/30/2023    Lipid Panel 12/27/2027 12/27/2022        No orders of the defined types were placed in this encounter.    The following information is provided to all patients.  This information is to help you find resources for any of the problems found today that may be affecting your health:                Living healthy guide: www.[facility].[place].gov      Understanding Diabetes: www.diabetes.org      Eating healthy: www.cdc.gov/healthyweight      CDC home safety checklist: www.cdc.gov/steadi/patient.html      Agency on Aging: www.goea.[place].[facility]      Alcoholics anonymous (AA): www.aa.org      Physical Activity: www.wilbert.nih.gov/vz0erdd      Tobacco use: www.quitwithusla.org

## 2023-07-05 NOTE — PROGRESS NOTES
Gil Cueva presented for a  Medicare AWV and comprehensive Health Risk Assessment today. The following components were reviewed and updated:    Medical history  Family History  Social history  Allergies and Current Medications  Health Risk Assessment  Health Maintenance  Care Team         ** See Completed Assessments for Annual Wellness Visit within the encounter summary.**         The following assessments were completed:  Living Situation  CAGE  Depression Screening  Timed Get Up and Go  Whisper Test  Cognitive Function Screening  Nutrition Screening  ADL Screening  PAQ Screening    Review for Opioid Screening: Patient does not have rx for Opioids.  Review for Substance Use Disorders: Patient does not use substance.      Vitals:    07/05/23 0921   BP: (!) 125/90   Pulse: 86   SpO2: 99%   Weight: 86.2 kg (190 lb)     Body mass index is 31.62 kg/m².  Physical Exam  Vitals reviewed.   HENT:      Head: Normocephalic.   Eyes:      Pupils: Pupils are equal, round, and reactive to light.   Cardiovascular:      Rate and Rhythm: Normal rate and regular rhythm.      Heart sounds: Normal heart sounds.   Pulmonary:      Effort: Pulmonary effort is normal.      Breath sounds: Normal breath sounds.   Abdominal:      General: Bowel sounds are normal.      Palpations: Abdomen is soft.   Musculoskeletal:         General: Normal range of motion.      Cervical back: Normal range of motion.      Right lower leg: No edema.      Left lower leg: No edema.   Skin:     General: Skin is warm and dry.   Neurological:      Mental Status: She is alert and oriented to person, place, and time.   Psychiatric:         Behavior: Behavior normal.             Diagnoses and health risks identified today and associated recommendations/orders:    1. Encounter for preventive health examination  - Above assessments completed. Preventive measures and health maintenance reviewed with patient.  -encouraged overdue vaccines  -pt to schedule cervical  cancer screening  -pt to discuss colonoscopy with PCP at next visit, does not want to undergo anesthesia    2. Aortic atherosclerosis  Stable, followed by PCP  -encouraged low chol/heart healthy diet  -pt exercises 4x week    3. ADIA (generalized anxiety disorder)  Stable, followed by PCP  -alprazolam prn and lorazepam for panic attacks    4. DDD (degenerative disc disease), cervical s/p fusion 2003  Chronic, followed by orthopedics  -advil prn    5. Hyperlipidemia, unspecified hyperlipidemia type  Stable, followed by PCP  -encourage low chol/heart healthy diet    6. Vertigo  Stable, followed by PCP  -on meclizine prn    7. History of breast cancer  -followed by Hem/Onc    8. Insomnia, unspecified type  Stable, followed by PCP  -on alprazolam prn    9. Obesity (BMI 30.0-34.9)  -Discussed weight reduction strategies, including following heart healthy/low chol diet, reducing portion sizes, caloric intake and snacking.  -Discussed importance of engaging in physical activity at least 5x/week for a minimum of 30 min/day.    10. Brain lesion  Stable, followed by Neurosurgery      Provided Gil with a 5-10 year written screening schedule and personal prevention plan. Recommendations were developed using the USPSTF age appropriate recommendations. Education, counseling, and referrals were provided as needed. After Visit Summary printed and given to patient which includes a list of additional screenings\tests needed.    Follow up in about 1 year (around 7/5/2024) for your next annual wellness visit.    Opal Oneil NP      I offered to discuss advanced care planning, including how to pick a person who would make decisions for you if you were unable to make them for yourself, called a health care power of , and what kind of decisions you might make such as use of life sustaining treatments such as ventilators and tube feeding when faced with a life limiting illness recorded on a living will that they will need to  know. (How you want to be cared for as you near the end of your natural life)     X Patient is interested in learning more about how to make advanced directives.  I provided them paperwork and offered to discuss this with them.

## 2023-08-01 ENCOUNTER — LAB VISIT (OUTPATIENT)
Dept: LAB | Facility: HOSPITAL | Age: 58
End: 2023-08-01
Attending: FAMILY MEDICINE
Payer: MEDICARE

## 2023-08-01 ENCOUNTER — OFFICE VISIT (OUTPATIENT)
Dept: FAMILY MEDICINE | Facility: CLINIC | Age: 58
End: 2023-08-01
Payer: MEDICARE

## 2023-08-01 VITALS
WEIGHT: 194 LBS | HEIGHT: 65 IN | BODY MASS INDEX: 32.32 KG/M2 | RESPIRATION RATE: 14 BRPM | DIASTOLIC BLOOD PRESSURE: 70 MMHG | OXYGEN SATURATION: 97 % | SYSTOLIC BLOOD PRESSURE: 110 MMHG | TEMPERATURE: 98 F | HEART RATE: 71 BPM

## 2023-08-01 DIAGNOSIS — Z85.3 HISTORY OF BREAST CANCER: ICD-10-CM

## 2023-08-01 DIAGNOSIS — R53.83 OTHER FATIGUE: ICD-10-CM

## 2023-08-01 DIAGNOSIS — R42 VERTIGO: ICD-10-CM

## 2023-08-01 DIAGNOSIS — F41.1 GAD (GENERALIZED ANXIETY DISORDER): ICD-10-CM

## 2023-08-01 DIAGNOSIS — E53.8 B12 DEFICIENCY: ICD-10-CM

## 2023-08-01 DIAGNOSIS — E66.9 OBESITY (BMI 30.0-34.9): ICD-10-CM

## 2023-08-01 DIAGNOSIS — E61.1 IRON DEFICIENCY: ICD-10-CM

## 2023-08-01 DIAGNOSIS — Z17.0 MALIGNANT NEOPLASM OF CENTRAL PORTION OF LEFT BREAST IN FEMALE, ESTROGEN RECEPTOR POSITIVE: Chronic | ICD-10-CM

## 2023-08-01 DIAGNOSIS — C50.112 MALIGNANT NEOPLASM OF CENTRAL PORTION OF LEFT BREAST IN FEMALE, ESTROGEN RECEPTOR POSITIVE: Chronic | ICD-10-CM

## 2023-08-01 DIAGNOSIS — G47.00 INSOMNIA, UNSPECIFIED TYPE: ICD-10-CM

## 2023-08-01 DIAGNOSIS — E78.5 HYPERLIPIDEMIA, UNSPECIFIED HYPERLIPIDEMIA TYPE: ICD-10-CM

## 2023-08-01 DIAGNOSIS — I70.0 AORTIC ATHEROSCLEROSIS: ICD-10-CM

## 2023-08-01 DIAGNOSIS — E78.5 HYPERLIPIDEMIA, UNSPECIFIED HYPERLIPIDEMIA TYPE: Primary | ICD-10-CM

## 2023-08-01 DIAGNOSIS — M19.90 OSTEOARTHRITIS, UNSPECIFIED OSTEOARTHRITIS TYPE, UNSPECIFIED SITE: ICD-10-CM

## 2023-08-01 DIAGNOSIS — G93.9 BRAIN LESION: ICD-10-CM

## 2023-08-01 LAB
ALBUMIN SERPL BCP-MCNC: 4.2 G/DL (ref 3.5–5.2)
ALP SERPL-CCNC: 64 U/L (ref 55–135)
ALT SERPL W/O P-5'-P-CCNC: 16 U/L (ref 10–44)
ANION GAP SERPL CALC-SCNC: 10 MMOL/L (ref 8–16)
AST SERPL-CCNC: 17 U/L (ref 10–40)
BASOPHILS # BLD AUTO: 0.03 K/UL (ref 0–0.2)
BASOPHILS NFR BLD: 0.6 % (ref 0–1.9)
BILIRUB SERPL-MCNC: 0.5 MG/DL (ref 0.1–1)
BUN SERPL-MCNC: 24 MG/DL (ref 6–20)
CALCIUM SERPL-MCNC: 9.4 MG/DL (ref 8.7–10.5)
CHLORIDE SERPL-SCNC: 105 MMOL/L (ref 95–110)
CHOLEST SERPL-MCNC: 228 MG/DL (ref 120–199)
CHOLEST/HDLC SERPL: 3.3 {RATIO} (ref 2–5)
CO2 SERPL-SCNC: 23 MMOL/L (ref 23–29)
CREAT SERPL-MCNC: 0.8 MG/DL (ref 0.5–1.4)
DIFFERENTIAL METHOD: NORMAL
EOSINOPHIL # BLD AUTO: 0 K/UL (ref 0–0.5)
EOSINOPHIL NFR BLD: 0.4 % (ref 0–8)
ERYTHROCYTE [DISTWIDTH] IN BLOOD BY AUTOMATED COUNT: 13.3 % (ref 11.5–14.5)
EST. GFR  (NO RACE VARIABLE): >60 ML/MIN/1.73 M^2
FERRITIN SERPL-MCNC: 136 NG/ML (ref 20–300)
GLUCOSE SERPL-MCNC: 99 MG/DL (ref 70–110)
HCT VFR BLD AUTO: 40.9 % (ref 37–48.5)
HDLC SERPL-MCNC: 69 MG/DL (ref 40–75)
HDLC SERPL: 30.3 % (ref 20–50)
HGB BLD-MCNC: 13.1 G/DL (ref 12–16)
IMM GRANULOCYTES # BLD AUTO: 0.01 K/UL (ref 0–0.04)
IMM GRANULOCYTES NFR BLD AUTO: 0.2 % (ref 0–0.5)
IRON SERPL-MCNC: 87 UG/DL (ref 30–160)
LDLC SERPL CALC-MCNC: 146.6 MG/DL (ref 63–159)
LYMPHOCYTES # BLD AUTO: 1.7 K/UL (ref 1–4.8)
LYMPHOCYTES NFR BLD: 35.6 % (ref 18–48)
MCH RBC QN AUTO: 30.8 PG (ref 27–31)
MCHC RBC AUTO-ENTMCNC: 32 G/DL (ref 32–36)
MCV RBC AUTO: 96 FL (ref 82–98)
MONOCYTES # BLD AUTO: 0.5 K/UL (ref 0.3–1)
MONOCYTES NFR BLD: 9.4 % (ref 4–15)
NEUTROPHILS # BLD AUTO: 2.6 K/UL (ref 1.8–7.7)
NEUTROPHILS NFR BLD: 53.8 % (ref 38–73)
NONHDLC SERPL-MCNC: 159 MG/DL
NRBC BLD-RTO: 0 /100 WBC
PLATELET # BLD AUTO: 341 K/UL (ref 150–450)
PMV BLD AUTO: 10.3 FL (ref 9.2–12.9)
POTASSIUM SERPL-SCNC: 4.2 MMOL/L (ref 3.5–5.1)
PROT SERPL-MCNC: 7.3 G/DL (ref 6–8.4)
RBC # BLD AUTO: 4.26 M/UL (ref 4–5.4)
SATURATED IRON: 22 % (ref 20–50)
SODIUM SERPL-SCNC: 138 MMOL/L (ref 136–145)
TOTAL IRON BINDING CAPACITY: 392 UG/DL (ref 250–450)
TRANSFERRIN SERPL-MCNC: 265 MG/DL (ref 200–375)
TRIGL SERPL-MCNC: 62 MG/DL (ref 30–150)
TSH SERPL DL<=0.005 MIU/L-ACNC: 1.44 UIU/ML (ref 0.4–4)
VIT B12 SERPL-MCNC: 860 PG/ML (ref 210–950)
WBC # BLD AUTO: 4.89 K/UL (ref 3.9–12.7)

## 2023-08-01 PROCEDURE — 99214 PR OFFICE/OUTPT VISIT, EST, LEVL IV, 30-39 MIN: ICD-10-PCS | Mod: S$GLB,,, | Performed by: FAMILY MEDICINE

## 2023-08-01 PROCEDURE — 82607 VITAMIN B-12: CPT | Performed by: FAMILY MEDICINE

## 2023-08-01 PROCEDURE — 84443 ASSAY THYROID STIM HORMONE: CPT | Performed by: FAMILY MEDICINE

## 2023-08-01 PROCEDURE — 1159F MED LIST DOCD IN RCRD: CPT | Mod: CPTII,S$GLB,, | Performed by: FAMILY MEDICINE

## 2023-08-01 PROCEDURE — 3078F PR MOST RECENT DIASTOLIC BLOOD PRESSURE < 80 MM HG: ICD-10-PCS | Mod: CPTII,S$GLB,, | Performed by: FAMILY MEDICINE

## 2023-08-01 PROCEDURE — 3074F SYST BP LT 130 MM HG: CPT | Mod: CPTII,S$GLB,, | Performed by: FAMILY MEDICINE

## 2023-08-01 PROCEDURE — 99214 OFFICE O/P EST MOD 30 MIN: CPT | Mod: S$GLB,,, | Performed by: FAMILY MEDICINE

## 2023-08-01 PROCEDURE — 99999 PR PBB SHADOW E&M-EST. PATIENT-LVL III: CPT | Mod: PBBFAC,,, | Performed by: FAMILY MEDICINE

## 2023-08-01 PROCEDURE — 82728 ASSAY OF FERRITIN: CPT | Performed by: FAMILY MEDICINE

## 2023-08-01 PROCEDURE — 1159F PR MEDICATION LIST DOCUMENTED IN MEDICAL RECORD: ICD-10-PCS | Mod: CPTII,S$GLB,, | Performed by: FAMILY MEDICINE

## 2023-08-01 PROCEDURE — 3008F PR BODY MASS INDEX (BMI) DOCUMENTED: ICD-10-PCS | Mod: CPTII,S$GLB,, | Performed by: FAMILY MEDICINE

## 2023-08-01 PROCEDURE — 99999 PR PBB SHADOW E&M-EST. PATIENT-LVL III: ICD-10-PCS | Mod: PBBFAC,,, | Performed by: FAMILY MEDICINE

## 2023-08-01 PROCEDURE — 36415 COLL VENOUS BLD VENIPUNCTURE: CPT | Mod: PO | Performed by: FAMILY MEDICINE

## 2023-08-01 PROCEDURE — 84466 ASSAY OF TRANSFERRIN: CPT | Performed by: FAMILY MEDICINE

## 2023-08-01 PROCEDURE — 1160F PR REVIEW ALL MEDS BY PRESCRIBER/CLIN PHARMACIST DOCUMENTED: ICD-10-PCS | Mod: CPTII,S$GLB,, | Performed by: FAMILY MEDICINE

## 2023-08-01 PROCEDURE — 1160F RVW MEDS BY RX/DR IN RCRD: CPT | Mod: CPTII,S$GLB,, | Performed by: FAMILY MEDICINE

## 2023-08-01 PROCEDURE — 80061 LIPID PANEL: CPT | Performed by: FAMILY MEDICINE

## 2023-08-01 PROCEDURE — 80053 COMPREHEN METABOLIC PANEL: CPT | Performed by: FAMILY MEDICINE

## 2023-08-01 PROCEDURE — 3008F BODY MASS INDEX DOCD: CPT | Mod: CPTII,S$GLB,, | Performed by: FAMILY MEDICINE

## 2023-08-01 PROCEDURE — 3074F PR MOST RECENT SYSTOLIC BLOOD PRESSURE < 130 MM HG: ICD-10-PCS | Mod: CPTII,S$GLB,, | Performed by: FAMILY MEDICINE

## 2023-08-01 PROCEDURE — 85025 COMPLETE CBC W/AUTO DIFF WBC: CPT | Performed by: FAMILY MEDICINE

## 2023-08-01 PROCEDURE — 3078F DIAST BP <80 MM HG: CPT | Mod: CPTII,S$GLB,, | Performed by: FAMILY MEDICINE

## 2023-08-01 NOTE — PROGRESS NOTES
Subjective:       Patient ID: Gil Cueva is a 58 y.o. female.    Chief Complaint: Follow-up (6mth f/u)    Follow-up  Associated symptoms include fatigue and headaches. Pertinent negatives include no abdominal pain, arthralgias or chest pain.     Review of Systems   Constitutional:  Positive for fatigue. Negative for unexpected weight change.   Respiratory:  Negative for chest tightness and shortness of breath.    Cardiovascular:  Negative for chest pain, palpitations and leg swelling.   Gastrointestinal:  Negative for abdominal pain.   Musculoskeletal:  Negative for arthralgias.   Neurological:  Positive for headaches. Negative for dizziness, syncope and light-headedness.       Patient Active Problem List   Diagnosis    History of breast cancer    Arthritis    Osteoarthritis    Malignant neoplasm of central portion of left breast in female, estrogen receptor positive    DDD (degenerative disc disease), cervical s/p fusion 2003    Syncope    Arthralgia    Insomnia    ADIA (generalized anxiety disorder)    Obesity (BMI 30.0-34.9)    Hyperlipidemia    B12 deficiency    Vertigo    Aortic atherosclerosis    Brain lesion     Patient is here for a chronic conditions follow up.    Reviewed labs 12/2022  NS Dr. Muñoz brain lesion. MRI (tumor with perfusion) and spectroscopy. This is a patient who has had recent vertigo that comes on with anxiety.  She has a history of breast cancer more than 12 years ago but has been disease-free since. An MRI was ordered revealing 2 very small areas of enhancement in the james that were concerning for possible metastases. At the time of our last clinic visit on 4/11/2023, the pt is here to discuss her PET scan results, which show no evidence of hypermetabolic tumor. ? Congenital lesion.  Will do serial monitoring in 6 months    Has h/o migraines. Has been having more migraines last 1 month.  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 Relieved with IB       Chronic insomnia and ADIA controlled with xanax .5 mg-takes 2 at bedtime and non during day. Has been filling monthly and has a surplus     Cervical ddd s/p fusion 2003     Onc Dr. Carter h/o left breast ca 2008 lumpectomy. Mammo neg 3/22      Card Dr. Brown-has seen for palpitations -ended up related to anxiety.  High stress at  Home. 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 Under care of Dr. William.  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  Extremely resistant to taking anti-depressant meds. Intolerant to several which made her worse            PAP utd- 5/2019 ago Dr. Alvarado     Declines vaccines     Eye Dr. MYNOR Cifuentes-treats occ red eye and eyelid irritation with gooey d/c with rare limited use of prednisolone eye drops. Use 3-4 times per year for 2-5 days. Usually left eye. Needs refill     GI colonoscopy 2015 + polyps. Recommended 5 year surveillance  Objective:      Physical Exam  Vitals and nursing note reviewed.   Constitutional:       Appearance: She is well-developed.   Cardiovascular:      Rate and Rhythm: Normal rate and regular rhythm.      Heart sounds: Normal heart sounds.   Pulmonary:      Effort: Pulmonary effort is normal.      Breath sounds: Normal breath sounds.   Skin:     General: Skin is warm and dry.   Neurological:      Mental Status: She is alert and oriented to person, place, and time.         Assessment:       1. Hyperlipidemia, unspecified hyperlipidemia type    2. Insomnia, unspecified type    3. ADIA (generalized anxiety disorder)    4. Aortic atherosclerosis    5. Malignant neoplasm of central portion of left breast in female, estrogen receptor positive    6. History of breast cancer    7. Brain lesion    8. Vertigo    9. Obesity (BMI 30.0-34.9)    10. B12 deficiency    11. Osteoarthritis, unspecified osteoarthritis type, unspecified site    12. Iron " "deficiency    13. Other fatigue        Plan:         1. Insomnia, unspecified type  Controlled on current medications.  Continue current medications.      2. ADIA (generalized anxiety disorder)  Cont current mgmt    3. Hyperlipidemia, unspecified hyperlipidemia type  I recommend a heart healthy diet rich in fiber, fresh vegetables and fruit and low in saturated fats (fried foods, red meat, etc.).  I also recommend regular exercise including a minimum of 150 minutes of cardio exercise per week and 2-30 minute workouts of strength training like light weights, yoga, pilates, etc.  You should work toward a body mass index of < 25.      - Comprehensive Metabolic Panel; Future  - Lipid Panel; Future    4. Aortic atherosclerosis  Cont to lower risk factors    5. Malignant neoplasm of central portion of left breast in female, estrogen receptor positive  Cont onc monitoring and mgmt    6. History of breast cancer  See above    7. Brain lesion  See above     8. Vertigo  Cont current mgmt  - TSH; Future    9. Obesity (BMI 30.0-34.9)  Counseled patient on his ideal body weight, health consequences of being obese and current recommendations including weekly exercise and a heart healthy diet.  Current BMI is:Estimated body mass index is 32.28 kg/m² as calculated from the following:    Height as of this encounter: 5' 5" (1.651 m).    Weight as of this encounter: 88 kg (194 lb 0.1 oz)..  Patient is aware that ideal BMI < 25 or Weight in (lb) to have BMI = 25: 149.9.      10. B12 deficiency  Screen and treat as indicated:    - CBC Auto Differential; Future  - Vitamin B12; Future    11. Osteoarthritis, unspecified osteoarthritis type, unspecified site  Cont current mgmt    12. Iron deficiency  Screen and treat as indicated:    - Ferritin; Future  - Iron and TIBC; Future    13. Other fatigue  Screen and treat as indicated:    - TSH; Future      Time spent with patient: 20 minutes    Patient with be reevaluated in 6 months or sooner " prn    Greater than 50% of this visit was spent counseling as described in above documentation:Yes

## 2023-08-09 ENCOUNTER — PATIENT MESSAGE (OUTPATIENT)
Dept: ADMINISTRATIVE | Facility: HOSPITAL | Age: 58
End: 2023-08-09
Payer: MEDICARE

## 2023-08-10 LAB
HUMAN PAPILLOMAVIRUS (HPV): NORMAL
PAP RECOMMENDATION EXT: NORMAL
PAP SMEAR: NORMAL

## 2023-08-17 ENCOUNTER — PATIENT MESSAGE (OUTPATIENT)
Dept: HEMATOLOGY/ONCOLOGY | Facility: CLINIC | Age: 58
End: 2023-08-17

## 2023-09-08 ENCOUNTER — PATIENT MESSAGE (OUTPATIENT)
Dept: NEUROSURGERY | Facility: CLINIC | Age: 58
End: 2023-09-08
Payer: MEDICARE

## 2023-09-08 ENCOUNTER — OFFICE VISIT (OUTPATIENT)
Dept: OPHTHALMOLOGY | Facility: CLINIC | Age: 58
End: 2023-09-08
Payer: MEDICARE

## 2023-09-08 DIAGNOSIS — H02.88B MEIBOMIAN GLAND DYSFUNCTION (MGD), BILATERAL, BOTH UPPER AND LOWER LIDS: Primary | ICD-10-CM

## 2023-09-08 DIAGNOSIS — H04.123 DRY EYE SYNDROME, BILATERAL: ICD-10-CM

## 2023-09-08 DIAGNOSIS — H52.03 HYPEROPIA OF BOTH EYES: ICD-10-CM

## 2023-09-08 DIAGNOSIS — H02.88A MEIBOMIAN GLAND DYSFUNCTION (MGD), BILATERAL, BOTH UPPER AND LOWER LIDS: Primary | ICD-10-CM

## 2023-09-08 PROCEDURE — 1159F PR MEDICATION LIST DOCUMENTED IN MEDICAL RECORD: ICD-10-PCS | Mod: CPTII,S$GLB,, | Performed by: OPHTHALMOLOGY

## 2023-09-08 PROCEDURE — 1159F MED LIST DOCD IN RCRD: CPT | Mod: CPTII,S$GLB,, | Performed by: OPHTHALMOLOGY

## 2023-09-08 PROCEDURE — 99213 OFFICE O/P EST LOW 20 MIN: CPT | Mod: S$GLB,,, | Performed by: OPHTHALMOLOGY

## 2023-09-08 PROCEDURE — 99213 PR OFFICE/OUTPT VISIT, EST, LEVL III, 20-29 MIN: ICD-10-PCS | Mod: S$GLB,,, | Performed by: OPHTHALMOLOGY

## 2023-09-08 PROCEDURE — 92015 DETERMINE REFRACTIVE STATE: CPT | Mod: S$GLB,,, | Performed by: OPHTHALMOLOGY

## 2023-09-08 PROCEDURE — 99999 PR PBB SHADOW E&M-EST. PATIENT-LVL II: ICD-10-PCS | Mod: PBBFAC,,, | Performed by: OPHTHALMOLOGY

## 2023-09-08 PROCEDURE — 1160F PR REVIEW ALL MEDS BY PRESCRIBER/CLIN PHARMACIST DOCUMENTED: ICD-10-PCS | Mod: CPTII,S$GLB,, | Performed by: OPHTHALMOLOGY

## 2023-09-08 PROCEDURE — 99999 PR PBB SHADOW E&M-EST. PATIENT-LVL II: CPT | Mod: PBBFAC,,, | Performed by: OPHTHALMOLOGY

## 2023-09-08 PROCEDURE — 1160F RVW MEDS BY RX/DR IN RCRD: CPT | Mod: CPTII,S$GLB,, | Performed by: OPHTHALMOLOGY

## 2023-09-08 PROCEDURE — 92015 PR REFRACTION: ICD-10-PCS | Mod: S$GLB,,, | Performed by: OPHTHALMOLOGY

## 2023-09-08 NOTE — PROGRESS NOTES
HPI    Pt states she has been having trouble with her specs. States was making   her dizzy. States switched back to her old specs. States went to neuro (dr richey) after having trouble and was found to have two lesions in brain   stem.     States eyes gets red and inflamed. Hasn't happened in a while. States has   not been using any tears. Was using refresh PF and it made eyes feel   worse. Will use PF BID for a few days and it makes eyes feel much better.     Denies pain/ FOL/ floaters.     Last edited by Charley Vegas on 9/8/2023 11:10 AM.            Assessment /Plan     For exam results, see Encounter Report.    Meibomian gland dysfunction (MGD), bilateral, both upper and lower lids    Dry eye syndrome, bilateral    Hyperopia of both eyes      1. Meibomian gland dysfunction (MGD), bilateral, both upper and lower lids  Fairly well controlled  Pt gets episodic flares OS with episcleritis  I think okay to use prednisolone very infrequently  Recommend warm compresses    2. Dry eye syndrome, bilateral  Continue art tears    3. Hyperopia of both eyes  New glasses Rx today    F/u 1 year, routine exam

## 2023-09-20 DIAGNOSIS — M25.569 KNEE PAIN, UNSPECIFIED CHRONICITY, UNSPECIFIED LATERALITY: Primary | ICD-10-CM

## 2023-09-28 ENCOUNTER — HOSPITAL ENCOUNTER (OUTPATIENT)
Dept: RADIOLOGY | Facility: HOSPITAL | Age: 58
Discharge: HOME | End: 2023-09-28
Attending: ORTHOPAEDIC SURGERY
Payer: MEDICARE

## 2023-09-28 ENCOUNTER — OFFICE VISIT (OUTPATIENT)
Dept: ORTHOPEDICS | Facility: CLINIC | Age: 58
End: 2023-09-28
Payer: MEDICARE

## 2023-09-28 VITALS — HEIGHT: 65 IN | RESPIRATION RATE: 18 BRPM | BODY MASS INDEX: 32.32 KG/M2 | WEIGHT: 194 LBS

## 2023-09-28 DIAGNOSIS — S83.281A ACUTE LATERAL MENISCUS TEAR OF RIGHT KNEE, INITIAL ENCOUNTER: Primary | ICD-10-CM

## 2023-09-28 DIAGNOSIS — M25.569 KNEE PAIN, UNSPECIFIED CHRONICITY, UNSPECIFIED LATERALITY: ICD-10-CM

## 2023-09-28 DIAGNOSIS — S83.281A TEAR OF LATERAL MENISCUS OF RIGHT KNEE, UNSPECIFIED TEAR TYPE, UNSPECIFIED WHETHER OLD OR CURRENT TEAR, INITIAL ENCOUNTER: Primary | ICD-10-CM

## 2023-09-28 PROCEDURE — 3008F BODY MASS INDEX DOCD: CPT | Mod: CPTII,S$GLB,, | Performed by: ORTHOPAEDIC SURGERY

## 2023-09-28 PROCEDURE — 99204 OFFICE O/P NEW MOD 45 MIN: CPT | Mod: S$GLB,,, | Performed by: ORTHOPAEDIC SURGERY

## 2023-09-28 PROCEDURE — 73562 X-RAY EXAM OF KNEE 3: CPT | Mod: TC,PO,LT

## 2023-09-28 PROCEDURE — 73564 X-RAY EXAM KNEE 4 OR MORE: CPT | Mod: 26,RT,, | Performed by: RADIOLOGY

## 2023-09-28 PROCEDURE — 1160F PR REVIEW ALL MEDS BY PRESCRIBER/CLIN PHARMACIST DOCUMENTED: ICD-10-PCS | Mod: CPTII,S$GLB,, | Performed by: ORTHOPAEDIC SURGERY

## 2023-09-28 PROCEDURE — 1160F RVW MEDS BY RX/DR IN RCRD: CPT | Mod: CPTII,S$GLB,, | Performed by: ORTHOPAEDIC SURGERY

## 2023-09-28 PROCEDURE — 1159F MED LIST DOCD IN RCRD: CPT | Mod: CPTII,S$GLB,, | Performed by: ORTHOPAEDIC SURGERY

## 2023-09-28 PROCEDURE — 73564 XR KNEE ORTHO RIGHT WITH FLEXION: ICD-10-PCS | Mod: 26,RT,, | Performed by: RADIOLOGY

## 2023-09-28 PROCEDURE — 73562 XR KNEE ORTHO RIGHT WITH FLEXION: ICD-10-PCS | Mod: 26,LT,, | Performed by: RADIOLOGY

## 2023-09-28 PROCEDURE — 99999 PR PBB SHADOW E&M-EST. PATIENT-LVL II: CPT | Mod: PBBFAC,,, | Performed by: ORTHOPAEDIC SURGERY

## 2023-09-28 PROCEDURE — 99999 PR PBB SHADOW E&M-EST. PATIENT-LVL II: ICD-10-PCS | Mod: PBBFAC,,, | Performed by: ORTHOPAEDIC SURGERY

## 2023-09-28 PROCEDURE — 73562 X-RAY EXAM OF KNEE 3: CPT | Mod: 26,LT,, | Performed by: RADIOLOGY

## 2023-09-28 PROCEDURE — 3008F PR BODY MASS INDEX (BMI) DOCUMENTED: ICD-10-PCS | Mod: CPTII,S$GLB,, | Performed by: ORTHOPAEDIC SURGERY

## 2023-09-28 PROCEDURE — 99204 PR OFFICE/OUTPT VISIT, NEW, LEVL IV, 45-59 MIN: ICD-10-PCS | Mod: S$GLB,,, | Performed by: ORTHOPAEDIC SURGERY

## 2023-09-28 PROCEDURE — 1159F PR MEDICATION LIST DOCUMENTED IN MEDICAL RECORD: ICD-10-PCS | Mod: CPTII,S$GLB,, | Performed by: ORTHOPAEDIC SURGERY

## 2023-09-28 NOTE — PROGRESS NOTES
Past Medical History:   Diagnosis Date    Breast cancer        Past Surgical History:   Procedure Laterality Date    ANKLE SURGERY Right     2016    BREAST LUMPECTOMY Left 2008    CERVICAL FUSION N/A 20003    c-3, 4, 5    ROTATOR CUFF REPAIR Right 2016    Dr. Perez       Current Outpatient Medications   Medication Sig    ALPRAZolam (XANAX) 0.5 MG tablet TAKE 1 TABLET(0.5 MG) BY MOUTH THREE TIMES DAILY AS NEEDED FOR ANXIETY    ibuprofen (ADVIL,MOTRIN) 200 MG tablet Take 200 mg by mouth every 6 (six) hours as needed for Pain.     Current Facility-Administered Medications   Medication    cyanocobalamin injection 1,000 mcg       Review of patient's allergies indicates:   Allergen Reactions    Penicillins     Sulfa (sulfonamide antibiotics)        Family History   Problem Relation Age of Onset    Breast cancer Maternal Aunt        Social History     Socioeconomic History    Marital status:     Number of children: 1   Tobacco Use    Smoking status: Never    Smokeless tobacco: Never   Substance and Sexual Activity    Alcohol use: Yes    Drug use: No    Sexual activity: Not Currently     Social Determinants of Health     Financial Resource Strain: High Risk (7/29/2023)    Overall Financial Resource Strain (CARDIA)     Difficulty of Paying Living Expenses: Hard   Food Insecurity: Food Insecurity Present (7/29/2023)    Hunger Vital Sign     Worried About Running Out of Food in the Last Year: Sometimes true     Ran Out of Food in the Last Year: Often true   Transportation Needs: No Transportation Needs (7/29/2023)    PRAPARE - Transportation     Lack of Transportation (Medical): No     Lack of Transportation (Non-Medical): No   Physical Activity: Sufficiently Active (7/29/2023)    Exercise Vital Sign     Days of Exercise per Week: 4 days     Minutes of Exercise per Session: 120 min   Stress: Stress Concern Present (7/29/2023)    [employer] [place] [place] [employer] - Occupational Stress Questionnaire     Feeling of  Stress : Very much   Social Connections: Moderately Integrated (7/29/2023)    Social Connection and Isolation Panel [NHANES]     Frequency of Communication with Friends and Family: More than three times a week     Frequency of Social Gatherings with Friends and Family: Once a week     Attends Oriental orthodox Services: Never     Active Member of Clubs or Organizations: Yes     Attends Club or Organization Meetings: More than 4 times per year     Marital Status:    Recent Concern: Social Connections - Moderately Isolated (7/5/2023)    Social Connection and Isolation Panel [NHANES]     Frequency of Communication with Friends and Family: More than three times a week     Frequency of Social Gatherings with Friends and Family: More than three times a week     Attends Oriental orthodox Services: Never     Active Member of Clubs or Organizations: No     Attends Club or Organization Meetings: Never     Marital Status:    Housing Stability: Low Risk  (7/29/2023)    Housing Stability Vital Sign     Unable to Pay for Housing in the Last Year: No     Number of Places Lived in the Last Year: 1     Unstable Housing in the Last Year: No       Chief Complaint: No chief complaint on file.      History of present illness:  This is a 58-year-old female seen for right knee pain.  Patient originally injured her knee back in March of 2022.  She saw Dr. Negron.  Patient had an MRI which shows what looked to be a patellar dislocation.  Patient was treated with physical therapy and a cortisone injection.  Patient was doing okay but always had some limitations.  Knee then got much worse after doing a running race with her handicap son 2 weeks ago.  Since then she is had severe stabbing pain along the lateral compartment of her knee.  Pain with twisting and squatting.  Knee feels like it wants to give out.  Pain is a 10/10.  Previous cortisone injections never helped her knee.      Review of Systems:    Constitution: Negative for chills,  fever, and sweats.  Negative for unexplained weight loss.    HENT:  Negative for headaches and blurry vision.    Cardiovascular:Negative for chest pain or irregular heart beat. Negative for hypertension.    Respiratory:  Negative for cough and shortness of breath.    Gastrointestinal: Negative for abdominal pain, heartburn, melena, nausea, and vomitting.    Genitourinary:  Negative bladder incontinence and dysuria.    Musculoskeletal:  See HPI    Neurological: Negative for numbness.    Psychiatric/Behavioral: Negative for depression.  The patient is not nervous/anxious.      Endocrine: Negative for polyuria    Hematologic/Lymphatic: Negative for bleeding problem.  Does not bruise/bleed easily.    Skin: Negative for poor would healing and rash      Physical Examination:    Vital Signs:  There were no vitals filed for this visit.    There is no height or weight on file to calculate BMI.    This a well-developed, well nourished patient in no acute distress.  They are alert and oriented and cooperative to examination.  Pt. walks without an antalgic gait.      Examination of the right knee shows no rashes or erythema. There are no masses ecchymosis or effusion. Patient has full range of motion from 0-130°. Patient is markedly tender o palpation over lateral joint line and nontender to palpation over the medial joint line. Patient has a - Lachman exam, - anterior drawer exam, and - posterior drawer exam.  Positive lateral Apley exam. Knee is stable to varus and valgus stress. 5 out of 5 motor strength. Palpable distal pulses. Intact light touch sensation. Negative Patellofemoral crepitus      X-rays:  X-rays of the right knee are ordered and reviewed which show minimal arthritic changes with well-maintained joint space.     Assessment::  Right lateral meniscal tear    Plan:    I reviewed the findings with her today.  Recommended a new MRI to evaluate the lateral meniscus.  Patient has joint line pain.  Patient has  instability.  Patient had a new injury to the right knee.  She is also tried physical therapy and cortisone injections previously without any real benefit.    All previous pertinent notes including ER visits, physical therapy visits, other orthopedic visits as well as other care for the same musculoskeletal problem were reviewed.  All pertinent lab values and previous imaging was reviewed pertinent to the current visit.    This note was created using [employer] voice recognition software that occasionally misinterpreted phrases or words.    Consult note is delivered via Epic messaging service.

## 2023-10-02 ENCOUNTER — OFFICE VISIT (OUTPATIENT)
Dept: HEMATOLOGY/ONCOLOGY | Facility: CLINIC | Age: 58
End: 2023-10-02
Payer: MEDICARE

## 2023-10-02 VITALS
WEIGHT: 195.88 LBS | HEART RATE: 85 BPM | BODY MASS INDEX: 32.6 KG/M2 | DIASTOLIC BLOOD PRESSURE: 82 MMHG | TEMPERATURE: 98 F | OXYGEN SATURATION: 99 % | SYSTOLIC BLOOD PRESSURE: 146 MMHG

## 2023-10-02 DIAGNOSIS — C50.112 MALIGNANT NEOPLASM OF CENTRAL PORTION OF LEFT BREAST IN FEMALE, ESTROGEN RECEPTOR POSITIVE: Chronic | ICD-10-CM

## 2023-10-02 DIAGNOSIS — Z12.31 ENCOUNTER FOR SCREENING MAMMOGRAM FOR MALIGNANT NEOPLASM OF BREAST: ICD-10-CM

## 2023-10-02 DIAGNOSIS — G93.9 BRAIN LESION: ICD-10-CM

## 2023-10-02 DIAGNOSIS — Z17.0 MALIGNANT NEOPLASM OF CENTRAL PORTION OF LEFT BREAST IN FEMALE, ESTROGEN RECEPTOR POSITIVE: Chronic | ICD-10-CM

## 2023-10-02 PROCEDURE — 3077F PR MOST RECENT SYSTOLIC BLOOD PRESSURE >= 140 MM HG: ICD-10-PCS | Mod: CPTII,S$GLB,, | Performed by: INTERNAL MEDICINE

## 2023-10-02 PROCEDURE — 3077F SYST BP >= 140 MM HG: CPT | Mod: CPTII,S$GLB,, | Performed by: INTERNAL MEDICINE

## 2023-10-02 PROCEDURE — 99213 OFFICE O/P EST LOW 20 MIN: CPT | Mod: S$GLB,,, | Performed by: INTERNAL MEDICINE

## 2023-10-02 PROCEDURE — 99213 PR OFFICE/OUTPT VISIT, EST, LEVL III, 20-29 MIN: ICD-10-PCS | Mod: S$GLB,,, | Performed by: INTERNAL MEDICINE

## 2023-10-02 PROCEDURE — 1160F RVW MEDS BY RX/DR IN RCRD: CPT | Mod: CPTII,S$GLB,, | Performed by: INTERNAL MEDICINE

## 2023-10-02 PROCEDURE — 1159F MED LIST DOCD IN RCRD: CPT | Mod: CPTII,S$GLB,, | Performed by: INTERNAL MEDICINE

## 2023-10-02 PROCEDURE — 3079F DIAST BP 80-89 MM HG: CPT | Mod: CPTII,S$GLB,, | Performed by: INTERNAL MEDICINE

## 2023-10-02 PROCEDURE — 3008F BODY MASS INDEX DOCD: CPT | Mod: CPTII,S$GLB,, | Performed by: INTERNAL MEDICINE

## 2023-10-02 PROCEDURE — 3079F PR MOST RECENT DIASTOLIC BLOOD PRESSURE 80-89 MM HG: ICD-10-PCS | Mod: CPTII,S$GLB,, | Performed by: INTERNAL MEDICINE

## 2023-10-02 PROCEDURE — 1160F PR REVIEW ALL MEDS BY PRESCRIBER/CLIN PHARMACIST DOCUMENTED: ICD-10-PCS | Mod: CPTII,S$GLB,, | Performed by: INTERNAL MEDICINE

## 2023-10-02 PROCEDURE — 3008F PR BODY MASS INDEX (BMI) DOCUMENTED: ICD-10-PCS | Mod: CPTII,S$GLB,, | Performed by: INTERNAL MEDICINE

## 2023-10-02 PROCEDURE — 1159F PR MEDICATION LIST DOCUMENTED IN MEDICAL RECORD: ICD-10-PCS | Mod: CPTII,S$GLB,, | Performed by: INTERNAL MEDICINE

## 2023-10-02 RX ORDER — MELOXICAM 15 MG/1
15 TABLET ORAL DAILY
COMMUNITY

## 2023-10-02 NOTE — ASSESSMENT & PLAN NOTE
Patient is doing well and appears MARIO at this time. Exam is negative.  PET negative this year and will be due for repeat mammo in March 2024.   
This appears to be benign on follow up MRI.  Will continue follow up with Dr. Muñoz and will have continued MRI scans for now.   
Other

## 2023-10-02 NOTE — PROGRESS NOTES
PROGRESS NOTE    Subjective:       Patient ID: Gil Cueva is a 58 y.o. female.    Dx: 8/8/2011, IDCA  Lump/ALND: 8/22/2011  ER 99%, %, Her2 neg  [id number], 8mm tumor.   Low risk oncotype:  Began Lennon 10/3/2011-2/2017    3/30/2023-MRI Brain:  Two subcentimeter enhancing lesions within the rightward aspect of the james concerning for intracranial metastasis.    5/2/2023-MRI brain(tumor w/ perfusion)  Stable subcentimeter somewhat brush like enhancing lesions within the right james 1 ventrally and 1 dorsally with corresponding susceptibility overall configuration most compatible with capillary telangiectasias.     There is a punctate separate focus of susceptibility in the right mid james suggestive for possible remote microhemorrhage without corresponding edema or enhancement     No evidence for separate enhancing lesion to suggest definite intracranial metastatic disease.    Chief Complaint:  No chief complaint on file.  f/u breast cancer.     History of Present Illness:   Gil Cueva is a 58 y.o. female who presents routine f/u for breast cancer.     Ms. Cueva complains of muscle and joint pain now.  Is to see rheum this week and has been seeing ortho as well.      Mammo neg 3/21/2022    Family and Social history reviewed and is unchanged from 9/16/2011.       ROS:  Review of Systems   Constitutional:  Negative for appetite change, fever and unexpected weight change.   HENT:  Negative for mouth sores.    Eyes:  Negative for visual disturbance.   Respiratory:  Negative for cough and shortness of breath.    Cardiovascular:  Negative for chest pain and leg swelling.   Gastrointestinal:  Negative for abdominal pain, blood in stool and diarrhea.   Genitourinary:  Negative for frequency and hematuria.   Musculoskeletal:  Negative for back pain.   Skin:  Negative for rash.   Neurological:  Negative for headaches.   Hematological:  Negative for  adenopathy.   Psychiatric/Behavioral:  The patient is not nervous/anxious.           Current Outpatient Medications:     meloxicam (MOBIC) 15 MG tablet, Take 15 mg by mouth once daily., Disp: , Rfl:     ALPRAZolam (XANAX) 0.5 MG tablet, TAKE 1 TABLET(0.5 MG) BY MOUTH THREE TIMES DAILY AS NEEDED FOR ANXIETY (Patient not taking: Reported on 10/2/2023), Disp: 90 tablet, Rfl: 5    ibuprofen (ADVIL,MOTRIN) 200 MG tablet, Take 200 mg by mouth every 6 (six) hours as needed for Pain., Disp: , Rfl:     Current Facility-Administered Medications:     cyanocobalamin injection 1,000 mcg, 1,000 mcg, Subcutaneous, Q30 Days, Jona Pretty MD, 1,000 mcg at 02/17/20 1025        Objective:       Physical Examination:     BP (!) 146/82   Pulse 85   Temp 98.1 °F (36.7 °C)   Wt 88.9 kg (195 lb 14.4 oz)   SpO2 99%   BMI 32.60 kg/m²     Physical Exam  Constitutional:       Appearance: She is well-developed.   HENT:      Head: Normocephalic and atraumatic.      Right Ear: External ear normal.      Left Ear: External ear normal.   Eyes:      Conjunctiva/sclera: Conjunctivae normal.      Pupils: Pupils are equal, round, and reactive to light.   Neck:      Thyroid: No thyromegaly.      Trachea: No tracheal deviation.   Cardiovascular:      Rate and Rhythm: Normal rate and regular rhythm.      Heart sounds: Normal heart sounds.   Pulmonary:      Effort: Pulmonary effort is normal.      Breath sounds: Normal breath sounds.   Chest:       Abdominal:      General: Bowel sounds are normal. There is no distension.      Palpations: Abdomen is soft. There is no mass.      Tenderness: There is no abdominal tenderness.   Skin:     Findings: No rash.   Neurological:      Comments: Neuro intact througout   Psychiatric:         Behavior: Behavior normal.         Thought Content: Thought content normal.         Judgment: Judgment normal.         Labs:   No results found for this or any previous visit (from the past 336 hour(s)).  CMP  Sodium  "  Date Value Ref Range Status   08/01/2023 138 136 - 145 mmol/L Final     Potassium   Date Value Ref Range Status   08/01/2023 4.2 3.5 - 5.1 mmol/L Final     Chloride   Date Value Ref Range Status   08/01/2023 105 95 - 110 mmol/L Final     CO2   Date Value Ref Range Status   08/01/2023 23 23 - 29 mmol/L Final     Glucose   Date Value Ref Range Status   08/01/2023 99 70 - 110 mg/dL Final     BUN   Date Value Ref Range Status   08/01/2023 24 (H) 6 - 20 mg/dL Final     Creatinine   Date Value Ref Range Status   08/01/2023 0.8 0.5 - 1.4 mg/dL Final     Calcium   Date Value Ref Range Status   08/01/2023 9.4 8.7 - 10.5 mg/dL Final     Total Protein   Date Value Ref Range Status   08/01/2023 7.3 6.0 - 8.4 g/dL Final     Albumin   Date Value Ref Range Status   08/01/2023 4.2 3.5 - 5.2 g/dL Final     Total Bilirubin   Date Value Ref Range Status   08/01/2023 0.5 0.1 - 1.0 mg/dL Final     Comment:     For infants and newborns, interpretation of results should be based  on gestational age, weight and in agreement with clinical  observations.    Premature Infant recommended reference ranges:  Up to 24 hours.............<8.0 mg/dL  Up to 48 hours............<12.0 mg/dL  3-5 days..................<15.0 mg/dL  6-29 days.................<15.0 mg/dL       Alkaline Phosphatase   Date Value Ref Range Status   08/01/2023 64 55 - 135 U/L Final     AST   Date Value Ref Range Status   08/01/2023 17 10 - 40 U/L Final     ALT   Date Value Ref Range Status   08/01/2023 16 10 - 44 U/L Final     Anion Gap   Date Value Ref Range Status   08/01/2023 10 8 - 16 mmol/L Final     eGFR if    Date Value Ref Range Status   08/25/2021 >60.0 >60 mL/min/1.73 m^2 Final     eGFR if non    Date Value Ref Range Status   08/25/2021 >60.0 >60 mL/min/1.73 m^2 Final     Comment:     Calculation used to obtain the estimated glomerular filtration  rate (eGFR) is the CKD-EPI equation.        No results found for: "CEA"  No results " "found for: "PSA"        Assessment/Plan:     Problem List Items Addressed This Visit       Malignant neoplasm of central portion of left breast in female, estrogen receptor positive (Chronic)     Patient is doing well and appears MARIO at this time. Exam is negative.  PET negative this year and will be due for repeat mammo in March 2024.          Relevant Orders    Mammo Digital Screening Bilat w/ Nabeel    CBC Auto Differential    Comprehensive Metabolic Panel    Brain lesion     This appears to be benign on follow up MRI.  Will continue follow up with Dr. Muñoz and will have continued MRI scans for now.           Other Visit Diagnoses       Encounter for screening mammogram for malignant neoplasm of breast        Relevant Orders    Mammo Digital Screening Bilat w/ Nabeel          Discussion:     Follow up in about 6 months (around 4/2/2024).      Electronically signed by Jona Arrington                "

## 2023-10-09 ENCOUNTER — HOSPITAL ENCOUNTER (OUTPATIENT)
Dept: RADIOLOGY | Facility: HOSPITAL | Age: 58
Discharge: HOME | End: 2023-10-09
Attending: ORTHOPAEDIC SURGERY
Payer: MEDICARE

## 2023-10-09 DIAGNOSIS — S83.281A TEAR OF LATERAL MENISCUS OF RIGHT KNEE, UNSPECIFIED TEAR TYPE, UNSPECIFIED WHETHER OLD OR CURRENT TEAR, INITIAL ENCOUNTER: ICD-10-CM

## 2023-10-09 PROCEDURE — 73721 MRI JNT OF LWR EXTRE W/O DYE: CPT | Mod: TC,PO,RT

## 2023-10-12 ENCOUNTER — OFFICE VISIT (OUTPATIENT)
Dept: ORTHOPEDICS | Facility: CLINIC | Age: 58
End: 2023-10-12
Payer: MEDICARE

## 2023-10-12 ENCOUNTER — HOSPITAL ENCOUNTER (OUTPATIENT)
Dept: RADIOLOGY | Facility: HOSPITAL | Age: 58
Discharge: HOME | End: 2023-10-12
Attending: ORTHOPAEDIC SURGERY
Payer: MEDICARE

## 2023-10-12 VITALS — HEIGHT: 65 IN | RESPIRATION RATE: 18 BRPM | WEIGHT: 195 LBS | BODY MASS INDEX: 32.49 KG/M2

## 2023-10-12 DIAGNOSIS — M25.569 KNEE PAIN, UNSPECIFIED CHRONICITY, UNSPECIFIED LATERALITY: ICD-10-CM

## 2023-10-12 DIAGNOSIS — M89.9 OSTEOCHONDRAL LESION: Primary | ICD-10-CM

## 2023-10-12 DIAGNOSIS — M89.9 OSTEOCHONDRAL LESION: ICD-10-CM

## 2023-10-12 DIAGNOSIS — M94.9 OSTEOCHONDRAL LESION: Primary | ICD-10-CM

## 2023-10-12 DIAGNOSIS — S83.281A TEAR OF LATERAL MENISCUS OF RIGHT KNEE, UNSPECIFIED TEAR TYPE, UNSPECIFIED WHETHER OLD OR CURRENT TEAR, INITIAL ENCOUNTER: ICD-10-CM

## 2023-10-12 DIAGNOSIS — M94.9 OSTEOCHONDRAL LESION: ICD-10-CM

## 2023-10-12 PROCEDURE — 3008F BODY MASS INDEX DOCD: CPT | Mod: CPTII,S$GLB,, | Performed by: ORTHOPAEDIC SURGERY

## 2023-10-12 PROCEDURE — 99999 PR PBB SHADOW E&M-EST. PATIENT-LVL II: ICD-10-PCS | Mod: PBBFAC,,, | Performed by: ORTHOPAEDIC SURGERY

## 2023-10-12 PROCEDURE — 99999 PR PBB SHADOW E&M-EST. PATIENT-LVL II: CPT | Mod: PBBFAC,,, | Performed by: ORTHOPAEDIC SURGERY

## 2023-10-12 PROCEDURE — 77073 BONE LENGTH STUDIES: CPT | Mod: 26,,, | Performed by: RADIOLOGY

## 2023-10-12 PROCEDURE — 1160F RVW MEDS BY RX/DR IN RCRD: CPT | Mod: CPTII,S$GLB,, | Performed by: ORTHOPAEDIC SURGERY

## 2023-10-12 PROCEDURE — 3008F PR BODY MASS INDEX (BMI) DOCUMENTED: ICD-10-PCS | Mod: CPTII,S$GLB,, | Performed by: ORTHOPAEDIC SURGERY

## 2023-10-12 PROCEDURE — 1159F MED LIST DOCD IN RCRD: CPT | Mod: CPTII,S$GLB,, | Performed by: ORTHOPAEDIC SURGERY

## 2023-10-12 PROCEDURE — 99214 OFFICE O/P EST MOD 30 MIN: CPT | Mod: 57,S$GLB,, | Performed by: ORTHOPAEDIC SURGERY

## 2023-10-12 PROCEDURE — 1159F PR MEDICATION LIST DOCUMENTED IN MEDICAL RECORD: ICD-10-PCS | Mod: CPTII,S$GLB,, | Performed by: ORTHOPAEDIC SURGERY

## 2023-10-12 PROCEDURE — 99214 PR OFFICE/OUTPT VISIT, EST, LEVL IV, 30-39 MIN: ICD-10-PCS | Mod: 57,S$GLB,, | Performed by: ORTHOPAEDIC SURGERY

## 2023-10-12 PROCEDURE — 1160F PR REVIEW ALL MEDS BY PRESCRIBER/CLIN PHARMACIST DOCUMENTED: ICD-10-PCS | Mod: CPTII,S$GLB,, | Performed by: ORTHOPAEDIC SURGERY

## 2023-10-12 PROCEDURE — 77073 BONE LENGTH STUDIES: CPT | Mod: TC

## 2023-10-12 PROCEDURE — 77073 XR HIP TO ANKLE: ICD-10-PCS | Mod: 26,,, | Performed by: RADIOLOGY

## 2023-10-12 NOTE — PROGRESS NOTES
Past Medical History:   Diagnosis Date    Breast cancer        Past Surgical History:   Procedure Laterality Date    ANKLE SURGERY Right     2016    BREAST LUMPECTOMY Left 2008    CERVICAL FUSION N/A 20003    c-3, 4, 5    ROTATOR CUFF REPAIR Right 2016    Dr. Perez       Current Outpatient Medications   Medication Sig    ibuprofen (ADVIL,MOTRIN) 200 MG tablet Take 200 mg by mouth every 6 (six) hours as needed for Pain.    meloxicam (MOBIC) 15 MG tablet Take 15 mg by mouth once daily.     Current Facility-Administered Medications   Medication    cyanocobalamin injection 1,000 mcg       Review of patient's allergies indicates:   Allergen Reactions    Penicillins     Sulfa (sulfonamide antibiotics)        Family History   Problem Relation Age of Onset    Breast cancer Maternal Aunt        Social History     Socioeconomic History    Marital status:     Number of children: 1   Tobacco Use    Smoking status: Never    Smokeless tobacco: Never   Substance and Sexual Activity    Alcohol use: Yes    Drug use: No    Sexual activity: Not Currently     Social Determinants of Health     Financial Resource Strain: High Risk (7/29/2023)    Overall Financial Resource Strain (CARDIA)     Difficulty of Paying Living Expenses: Hard   Food Insecurity: Food Insecurity Present (7/29/2023)    Hunger Vital Sign     Worried About Running Out of Food in the Last Year: Sometimes true     Ran Out of Food in the Last Year: Often true   Transportation Needs: No Transportation Needs (7/29/2023)    PRAPARE - Transportation     Lack of Transportation (Medical): No     Lack of Transportation (Non-Medical): No   Physical Activity: Sufficiently Active (9/28/2023)    Exercise Vital Sign     Days of Exercise per Week: 4 days     Minutes of Exercise per Session: 90 min   Stress: Stress Concern Present (9/28/2023)    [employer] [employer] - Occupational Stress Questionnaire     Feeling of Stress : Rather much   Social  Connections: Moderately Integrated (9/28/2023)    Social Connection and Isolation Panel [NHANES]     Frequency of Communication with Friends and Family: More than three times a week     Frequency of Social Gatherings with Friends and Family: Once a week     Attends Caodaism Services: Never     Active Member of Clubs or Organizations: Yes     Attends Club or Organization Meetings: More than 4 times per year     Marital Status:    Recent Concern: Social Connections - Moderately Isolated (7/5/2023)    Social Connection and Isolation Panel [NHANES]     Frequency of Communication with Friends and Family: More than three times a week     Frequency of Social Gatherings with Friends and Family: More than three times a week     Attends Caodaism Services: Never     Active Member of Clubs or Organizations: No     Attends Club or Organization Meetings: Never     Marital Status:    Housing Stability: Low Risk  (9/28/2023)    Housing Stability Vital Sign     Unable to Pay for Housing in the Last Year: No     Number of Places Lived in the Last Year: 1     Unstable Housing in the Last Year: No       Chief Complaint: No chief complaint on file.      History of present illness:  This is a 58-year-old female seen for right knee pain.  Patient originally injured her knee back in March of 2022.  She saw Dr. Negron.  Patient had an MRI which shows what looked to be a patellar dislocation.  Patient was treated with physical therapy and a cortisone injection.  Patient was doing okay but always had some limitations.  Knee then got much worse after doing a running race with her handicapped son 2 weeks ago.  Since then she has had severe stabbing pain along the lateral compartment of her knee.  Pain with twisting and squatting.  Knee feels like it wants to give out.  Pain is a 10/10.  Previous cortisone injections never helped her knee.  MRI shows a significant osteochondral lesion of the lateral femoral condyle.      Review  of Systems:  Musculoskeletal:  See HPI    Physical Examination:    Vital Signs:  There were no vitals filed for this visit.    There is no height or weight on file to calculate BMI.    This a well-developed, well nourished patient in no acute distress.  They are alert and oriented and cooperative to examination.  Pt. walks without an antalgic gait.      Examination of the right knee shows no rashes or erythema. There are no masses ecchymosis or effusion. Patient has full range of motion from 0-130°. Patient is markedly tender to palpation over lateral joint line and nontender to palpation over the medial joint line.  Positive lateral Apley exam. Knee is stable to varus and valgus stress. 5 out of 5 motor strength. Palpable distal pulses. Intact light touch sensation. Negative Patellofemoral crepitus    Heart is regular rate without obvious murmurs   Normal respiratory effort without audible wheezing  Abdomen is soft and nontender       X-rays:  X-rays of the right knee are  reviewed which show minimal arthritic changes with well-maintained joint space.    MRI of the right knee is reviewed and interpreted: 1.  Focal area of mild free edge irregularity of the posterior horn of the medial meniscus could reflect fraying or focal tear.  2.  Area of focal cartilage thinning with underlying subchondral cystic degenerative change of the lateral femoral condyle.     Assessment::  Possible right medial meniscal tear   Lateral femoral condyle chondral injury    Plan:    I reviewed the findings with her today.  Patient has joint line pain.  Patient has instability. She is also tried physical therapy and cortisone injections previously without any real benefit.  Recommended knee arthroscopy for further evaluation of the osteochondral lesion with possible debridement and unloading with an a lateral  brace.  Discussed with her that she might benefit from an osteochondral allograft if this procedure fails.  We will get  full-length standing films ordered to assess her limb alignment.  Risks, benefits, and alternatives to the procedure were explained to the patient including but not limited to damage to nerves, arteries, blood vessels, bones, tendons, ligaments, stiffness, instability, infection, permanent limb dysfunction, DVT, PE, as well as general anesthetic complications including seizure, stroke, heart attack and even death. The patient understood these risks and wished to proceed and signed the informed consent.       All previous pertinent notes including ER visits, physical therapy visits, other orthopedic visits as well as other care for the same musculoskeletal problem were reviewed.  All pertinent lab values and previous imaging was reviewed pertinent to the current visit.    This note was created using [email or web address] voice recognition software that occasionally misinterpreted phrases or words.    Consult note is delivered via Epic messaging service.

## 2023-10-12 NOTE — H&P (VIEW-ONLY)
Past Medical History:   Diagnosis Date    Breast cancer        Past Surgical History:   Procedure Laterality Date    ANKLE SURGERY Right     2016    BREAST LUMPECTOMY Left 2008    CERVICAL FUSION N/A 20003    c-3, 4, 5    ROTATOR CUFF REPAIR Right 2016    Dr. Perez       Current Outpatient Medications   Medication Sig    ibuprofen (ADVIL,MOTRIN) 200 MG tablet Take 200 mg by mouth every 6 (six) hours as needed for Pain.    meloxicam (MOBIC) 15 MG tablet Take 15 mg by mouth once daily.     Current Facility-Administered Medications   Medication    cyanocobalamin injection 1,000 mcg       Review of patient's allergies indicates:   Allergen Reactions    Penicillins     Sulfa (sulfonamide antibiotics)        Family History   Problem Relation Age of Onset    Breast cancer Maternal Aunt        Social History     Socioeconomic History    Marital status:     Number of children: 1   Tobacco Use    Smoking status: Never    Smokeless tobacco: Never   Substance and Sexual Activity    Alcohol use: Yes    Drug use: No    Sexual activity: Not Currently     Social Determinants of Health     Financial Resource Strain: High Risk (7/29/2023)    Overall Financial Resource Strain (CARDIA)     Difficulty of Paying Living Expenses: Hard   Food Insecurity: Food Insecurity Present (7/29/2023)    Hunger Vital Sign     Worried About Running Out of Food in the Last Year: Sometimes true     Ran Out of Food in the Last Year: Often true   Transportation Needs: No Transportation Needs (7/29/2023)    PRAPARE - Transportation     Lack of Transportation (Medical): No     Lack of Transportation (Non-Medical): No   Physical Activity: Sufficiently Active (9/28/2023)    Exercise Vital Sign     Days of Exercise per Week: 4 days     Minutes of Exercise per Session: 90 min   Stress: Stress Concern Present (9/28/2023)    [employer] [place] [employer] - Occupational Stress Questionnaire     Feeling of Stress : Rather much   Social  Connections: Moderately Integrated (9/28/2023)    Social Connection and Isolation Panel [NHANES]     Frequency of Communication with Friends and Family: More than three times a week     Frequency of Social Gatherings with Friends and Family: Once a week     Attends Latter-day Services: Never     Active Member of Clubs or Organizations: Yes     Attends Club or Organization Meetings: More than 4 times per year     Marital Status:    Recent Concern: Social Connections - Moderately Isolated (7/5/2023)    Social Connection and Isolation Panel [NHANES]     Frequency of Communication with Friends and Family: More than three times a week     Frequency of Social Gatherings with Friends and Family: More than three times a week     Attends Latter-day Services: Never     Active Member of Clubs or Organizations: No     Attends Club or Organization Meetings: Never     Marital Status:    Housing Stability: Low Risk  (9/28/2023)    Housing Stability Vital Sign     Unable to Pay for Housing in the Last Year: No     Number of Places Lived in the Last Year: 1     Unstable Housing in the Last Year: No       Chief Complaint: No chief complaint on file.      History of present illness:  This is a 58-year-old female seen for right knee pain.  Patient originally injured her knee back in March of 2022.  She saw Dr. Negron.  Patient had an MRI which shows what looked to be a patellar dislocation.  Patient was treated with physical therapy and a cortisone injection.  Patient was doing okay but always had some limitations.  Knee then got much worse after doing a running race with her handicapped son 2 weeks ago.  Since then she has had severe stabbing pain along the lateral compartment of her knee.  Pain with twisting and squatting.  Knee feels like it wants to give out.  Pain is a 10/10.  Previous cortisone injections never helped her knee.  MRI shows a significant osteochondral lesion of the lateral femoral condyle.      Review  of Systems:  Musculoskeletal:  See HPI    Physical Examination:    Vital Signs:  There were no vitals filed for this visit.    There is no height or weight on file to calculate BMI.    This a well-developed, well nourished patient in no acute distress.  They are alert and oriented and cooperative to examination.  Pt. walks without an antalgic gait.      Examination of the right knee shows no rashes or erythema. There are no masses ecchymosis or effusion. Patient has full range of motion from 0-130°. Patient is markedly tender to palpation over lateral joint line and nontender to palpation over the medial joint line.  Positive lateral Apley exam. Knee is stable to varus and valgus stress. 5 out of 5 motor strength. Palpable distal pulses. Intact light touch sensation. Negative Patellofemoral crepitus    Heart is regular rate without obvious murmurs   Normal respiratory effort without audible wheezing  Abdomen is soft and nontender       X-rays:  X-rays of the right knee are  reviewed which show minimal arthritic changes with well-maintained joint space.    MRI of the right knee is reviewed and interpreted: 1.  Focal area of mild free edge irregularity of the posterior horn of the medial meniscus could reflect fraying or focal tear.  2.  Area of focal cartilage thinning with underlying subchondral cystic degenerative change of the lateral femoral condyle.     Assessment::  Possible right medial meniscal tear   Lateral femoral condyle chondral injury    Plan:    I reviewed the findings with her today.  Patient has joint line pain.  Patient has instability. She is also tried physical therapy and cortisone injections previously without any real benefit.  Recommended knee arthroscopy for further evaluation of the osteochondral lesion with possible debridement and unloading with an a lateral  brace.  Discussed with her that she might benefit from an osteochondral allograft if this procedure fails.  We will get  full-length standing films ordered to assess her limb alignment.  Risks, benefits, and alternatives to the procedure were explained to the patient including but not limited to damage to nerves, arteries, blood vessels, bones, tendons, ligaments, stiffness, instability, infection, permanent limb dysfunction, DVT, PE, as well as general anesthetic complications including seizure, stroke, heart attack and even death. The patient understood these risks and wished to proceed and signed the informed consent.       All previous pertinent notes including ER visits, physical therapy visits, other orthopedic visits as well as other care for the same musculoskeletal problem were reviewed.  All pertinent lab values and previous imaging was reviewed pertinent to the current visit.    This note was created using [employer] voice recognition software that occasionally misinterpreted phrases or words.    Consult note is delivered via Epic messaging service.

## 2023-10-13 DIAGNOSIS — M94.9 OSTEOCHONDRAL LESION: Primary | ICD-10-CM

## 2023-10-13 DIAGNOSIS — Z01.818 PRE-OP EXAM: ICD-10-CM

## 2023-10-13 DIAGNOSIS — M89.9 OSTEOCHONDRAL LESION: Primary | ICD-10-CM

## 2023-10-13 RX ORDER — MUPIROCIN 20 MG/G
OINTMENT TOPICAL
Status: CANCELLED | OUTPATIENT
Start: 2023-10-13

## 2023-10-13 RX ORDER — CLINDAMYCIN PHOSPHATE 900 MG/50ML
900 INJECTION, SOLUTION INTRAVENOUS
Status: CANCELLED | OUTPATIENT
Start: 2023-10-13

## 2023-10-19 ENCOUNTER — HOSPITAL ENCOUNTER (OUTPATIENT)
Dept: PREADMISSION TESTING | Facility: HOSPITAL | Age: 58
Discharge: HOME | End: 2023-10-19
Attending: ORTHOPAEDIC SURGERY
Payer: MEDICARE

## 2023-10-19 ENCOUNTER — PATIENT MESSAGE (OUTPATIENT)
Dept: SURGERY | Facility: HOSPITAL | Age: 58
End: 2023-10-19

## 2023-10-19 VITALS — HEIGHT: 66 IN | BODY MASS INDEX: 30.53 KG/M2 | WEIGHT: 190 LBS

## 2023-10-19 DIAGNOSIS — M94.9 OSTEOCHONDRAL LESION: ICD-10-CM

## 2023-10-19 DIAGNOSIS — Z01.818 PREOP TESTING: ICD-10-CM

## 2023-10-19 DIAGNOSIS — Z01.818 PREOP TESTING: Primary | ICD-10-CM

## 2023-10-19 DIAGNOSIS — M89.9 OSTEOCHONDRAL LESION: ICD-10-CM

## 2023-10-19 DIAGNOSIS — Z01.818 PRE-OP EXAM: ICD-10-CM

## 2023-10-19 PROCEDURE — 93010 ELECTROCARDIOGRAM REPORT: CPT | Mod: ,,, | Performed by: GENERAL PRACTICE

## 2023-10-19 PROCEDURE — 93005 ELECTROCARDIOGRAM TRACING: CPT

## 2023-10-19 PROCEDURE — 93010 EKG 12-LEAD: ICD-10-PCS | Mod: ,,, | Performed by: GENERAL PRACTICE

## 2023-10-19 NOTE — DISCHARGE INSTRUCTIONS
To confirm, Your doctor has instructed you that surgery is scheduled for: 10/24/23 with Dr. Luna    Please report to [facility], Registration the morning of surgery. You must check-in and receive a wristband before going to your procedure.  [address] [place] [address] [place] [address]    Pre-Op will call the afternoon prior to surgery between 1:00 and 6:00 PM with the final arrival time.  Phone number: [phone]    PLEASE NOTE:  The surgery schedule has many variables which may affect the time of your surgery case.  Family members should be available if your surgery time changes.  Plan to be here the day of your procedure between 4-6 hours.    MEDICATIONS:  TAKE ONLY THESE MEDICATIONS WITH A SMALL SIP OF WATER THE MORNING OF YOUR PROCEDURE:  SEE LIST      DO NOT TAKE THESE MEDICATIONS 5-7 DAYS PRIOR to your procedure or per your surgeon's request:   ASPIRIN, ALEVE, ADVIL, IBUPROFEN, FISH OIL VITAMIN E, HERBALS  (May take Tylenol)    ONLY if you are prescribed any types of blood thinners such as:  Aspirin, Coumadin, Plavix, Pradaxa, Xarelto, Aggrenox, Effient, Eliquis, Savasya, Brilinta, or any other, ask your surgeon whether you should stop taking them and how long before surgery you should stop.  You may also need to verify with the prescribing physician if it is ok to stop your medication.      INSTRUCTIONS IMPORTANT!!  Do not eat or drink anything between midnight and the time of your procedure- this includes gum, mints, and candy.  Do not smoke or drink alcoholic beverages 24 hours prior to your procedure.  Shower the night before AND the morning of your procedure with a Chlorhexidine wash such as Hibiclens or Dial antibacterial soap from the neck down.  Do not get it on your face or in your eyes.  You may use your own shampoo and face wash. This helps your skin to be as bacteria free as possible.    If you wear contact lenses, dentures, hearing aids or glasses, bring a container to put  them in during surgery and give to a family member for safe keeping.  Please leave all jewelry, piercing's and valuables at home. You must remove your false eyelashes prior to surgery.    DO NOT remove hair from the surgery site.  Do not shave the incision site unless you are given specific instructions to do so.    ONLY if you have been diagnosed with sleep apnea please bring your C-PAP machine.  ONLY if you wear home oxygen please bring your portable oxygen tank the day of your procedure.  ONLY if you have a history of OPEN HEART SURGERY you will need a clearance from your Cardiologist per Anesthesia.      ONLY for patients requiring bowel prep, written instructions will be given by your doctor's office.  ONLY if you have a neuro stimulator, please bring the controller with you the morning of surgery  ONLY if a type and screen test is needed before surgery, please return:  If your doctor has scheduled you for an overnight stay, bring a small overnight bag with any personal items you need.  Make arrangements in advance for transportation home by a responsible adult.  It is not safe to drive a vehicle during the 24 hours after anesthesia.          All  facilities and properties are tobacco free.  Smoking is NOT allowed.   If you have any questions about these instructions, call Pre-Op Admit  Nursing at [phone] or the Pre-Op Day Surgery Unit at [phone].

## 2023-10-20 ENCOUNTER — ANESTHESIA EVENT (OUTPATIENT)
Dept: SURGERY | Facility: HOSPITAL | Age: 58
End: 2023-10-20
Payer: MEDICARE

## 2023-10-23 DIAGNOSIS — M94.9 OSTEOCHONDRAL LESION: Primary | ICD-10-CM

## 2023-10-23 DIAGNOSIS — M89.9 OSTEOCHONDRAL LESION: Primary | ICD-10-CM

## 2023-10-23 RX ORDER — IBUPROFEN 600 MG/1
600 TABLET ORAL 3 TIMES DAILY PRN
Qty: 30 TABLET | Refills: 0 | Status: SHIPPED | OUTPATIENT
Start: 2023-10-23 | End: 2023-11-06

## 2023-10-23 RX ORDER — HYDROCODONE BITARTRATE AND ACETAMINOPHEN 5; 325 MG/1; MG/1
1 TABLET ORAL EVERY 6 HOURS PRN
Qty: 14 TABLET | Refills: 0 | Status: SHIPPED | OUTPATIENT
Start: 2023-10-23 | End: 2023-11-06

## 2023-10-23 RX ORDER — CYCLOBENZAPRINE HCL 10 MG
10 TABLET ORAL 3 TIMES DAILY PRN
Qty: 30 TABLET | Refills: 0 | Status: SHIPPED | OUTPATIENT
Start: 2023-10-23 | End: 2023-11-06

## 2023-10-23 RX ORDER — ASPIRIN 81 MG/1
81 TABLET ORAL 2 TIMES DAILY
Qty: 56 TABLET | Refills: 0 | Status: SHIPPED | OUTPATIENT
Start: 2023-10-23 | End: 2023-11-06

## 2023-10-23 RX ORDER — ACETAMINOPHEN 500 MG
1000 TABLET ORAL EVERY 8 HOURS PRN
Qty: 30 TABLET | Refills: 0 | Status: SHIPPED | OUTPATIENT
Start: 2023-10-23 | End: 2023-11-06

## 2023-10-23 RX ORDER — ONDANSETRON 4 MG/1
4 TABLET, FILM COATED ORAL EVERY 6 HOURS PRN
Qty: 30 TABLET | Refills: 0 | Status: SHIPPED | OUTPATIENT
Start: 2023-10-23 | End: 2023-11-06

## 2023-10-24 ENCOUNTER — HOSPITAL ENCOUNTER (OUTPATIENT)
Facility: HOSPITAL | Age: 58
Discharge: HOME | End: 2023-10-24
Attending: ORTHOPAEDIC SURGERY | Admitting: ORTHOPAEDIC SURGERY
Payer: MEDICARE

## 2023-10-24 ENCOUNTER — ANESTHESIA (OUTPATIENT)
Dept: SURGERY | Facility: HOSPITAL | Age: 58
End: 2023-10-24
Payer: MEDICARE

## 2023-10-24 VITALS
TEMPERATURE: 98 F | HEART RATE: 66 BPM | BODY MASS INDEX: 29.82 KG/M2 | HEIGHT: 67 IN | DIASTOLIC BLOOD PRESSURE: 69 MMHG | WEIGHT: 190 LBS | RESPIRATION RATE: 16 BRPM | SYSTOLIC BLOOD PRESSURE: 131 MMHG | OXYGEN SATURATION: 100 %

## 2023-10-24 DIAGNOSIS — Z01.818 PRE-OP EXAM: ICD-10-CM

## 2023-10-24 DIAGNOSIS — M94.9 OSTEOCHONDRAL LESION: ICD-10-CM

## 2023-10-24 DIAGNOSIS — M89.9 OSTEOCHONDRAL LESION: ICD-10-CM

## 2023-10-24 PROCEDURE — 29881 ARTHRS KNE SRG MNISECTMY M/L: CPT | Mod: RT,,, | Performed by: ORTHOPAEDIC SURGERY

## 2023-10-24 PROCEDURE — 36000710: Performed by: ORTHOPAEDIC SURGERY

## 2023-10-24 PROCEDURE — 27201423 OPTIME MED/SURG SUP & DEVICES STERILE SUPPLY: Performed by: ORTHOPAEDIC SURGERY

## 2023-10-24 PROCEDURE — 36000711: Performed by: ORTHOPAEDIC SURGERY

## 2023-10-24 PROCEDURE — 37000008 HC ANESTHESIA 1ST 15 MINUTES: Performed by: ORTHOPAEDIC SURGERY

## 2023-10-24 PROCEDURE — 37000009 HC ANESTHESIA EA ADD 15 MINS: Performed by: ORTHOPAEDIC SURGERY

## 2023-10-24 PROCEDURE — 25000003 PHARM REV CODE 250: Performed by: ORTHOPAEDIC SURGERY

## 2023-10-24 PROCEDURE — 63600175 PHARM REV CODE 636 W HCPCS: Performed by: ORTHOPAEDIC SURGERY

## 2023-10-24 PROCEDURE — 94761 N-INVAS EAR/PLS OXIMETRY MLT: CPT

## 2023-10-24 PROCEDURE — 71000015 HC POSTOP RECOV 1ST HR: Performed by: ORTHOPAEDIC SURGERY

## 2023-10-24 PROCEDURE — 25000003 PHARM REV CODE 250: Performed by: NURSE ANESTHETIST, CERTIFIED REGISTERED

## 2023-10-24 PROCEDURE — 27000221 HC OXYGEN, UP TO 24 HOURS

## 2023-10-24 PROCEDURE — 29881 PR KNEE SCOPE SINGLE MENISECECTOMY: ICD-10-PCS | Mod: RT,,, | Performed by: ORTHOPAEDIC SURGERY

## 2023-10-24 PROCEDURE — D9220A PRA ANESTHESIA: ICD-10-PCS | Mod: ANES,,, | Performed by: ANESTHESIOLOGY

## 2023-10-24 PROCEDURE — D9220A PRA ANESTHESIA: ICD-10-PCS | Mod: CRNA,,, | Performed by: NURSE ANESTHETIST, CERTIFIED REGISTERED

## 2023-10-24 PROCEDURE — 71000039 HC RECOVERY, EACH ADD'L HOUR: Performed by: ORTHOPAEDIC SURGERY

## 2023-10-24 PROCEDURE — D9220A PRA ANESTHESIA: Mod: ANES,,, | Performed by: ANESTHESIOLOGY

## 2023-10-24 PROCEDURE — 63600175 PHARM REV CODE 636 W HCPCS: Performed by: ANESTHESIOLOGY

## 2023-10-24 PROCEDURE — 63600175 PHARM REV CODE 636 W HCPCS: Performed by: NURSE ANESTHETIST, CERTIFIED REGISTERED

## 2023-10-24 PROCEDURE — 71000033 HC RECOVERY, INTIAL HOUR: Performed by: ORTHOPAEDIC SURGERY

## 2023-10-24 PROCEDURE — D9220A PRA ANESTHESIA: Mod: CRNA,,, | Performed by: NURSE ANESTHETIST, CERTIFIED REGISTERED

## 2023-10-24 PROCEDURE — 25000003 PHARM REV CODE 250: Performed by: ANESTHESIOLOGY

## 2023-10-24 PROCEDURE — 94799 UNLISTED PULMONARY SVC/PX: CPT

## 2023-10-24 RX ORDER — LIDOCAINE HYDROCHLORIDE 20 MG/ML
INJECTION INTRAVENOUS
Status: DISCONTINUED | OUTPATIENT
Start: 2023-10-24 | End: 2023-10-24

## 2023-10-24 RX ORDER — OXYCODONE HYDROCHLORIDE 5 MG/1
5 TABLET ORAL ONCE AS NEEDED
Status: COMPLETED | OUTPATIENT
Start: 2023-10-24 | End: 2023-10-24

## 2023-10-24 RX ORDER — ACETAMINOPHEN 10 MG/ML
INJECTION, SOLUTION INTRAVENOUS
Status: DISCONTINUED | OUTPATIENT
Start: 2023-10-24 | End: 2023-10-24

## 2023-10-24 RX ORDER — MIDAZOLAM HYDROCHLORIDE 1 MG/ML
INJECTION INTRAMUSCULAR; INTRAVENOUS
Status: DISCONTINUED | OUTPATIENT
Start: 2023-10-24 | End: 2023-10-24

## 2023-10-24 RX ORDER — PROPOFOL 10 MG/ML
VIAL (ML) INTRAVENOUS
Status: DISCONTINUED | OUTPATIENT
Start: 2023-10-24 | End: 2023-10-24

## 2023-10-24 RX ORDER — MUPIROCIN 20 MG/G
OINTMENT TOPICAL
Status: DISCONTINUED | OUTPATIENT
Start: 2023-10-24 | End: 2023-10-24 | Stop reason: HOSPADM

## 2023-10-24 RX ORDER — CLINDAMYCIN PHOSPHATE 900 MG/50ML
900 INJECTION, SOLUTION INTRAVENOUS
Status: COMPLETED | OUTPATIENT
Start: 2023-10-24 | End: 2023-10-24

## 2023-10-24 RX ORDER — BUPIVACAINE HYDROCHLORIDE 2.5 MG/ML
INJECTION, SOLUTION EPIDURAL; INFILTRATION; INTRACAUDAL
Status: DISCONTINUED | OUTPATIENT
Start: 2023-10-24 | End: 2023-10-24 | Stop reason: HOSPADM

## 2023-10-24 RX ORDER — SCOLOPAMINE TRANSDERMAL SYSTEM 1 MG/1
1 PATCH, EXTENDED RELEASE TRANSDERMAL ONCE
Status: DISCONTINUED | OUTPATIENT
Start: 2023-10-24 | End: 2023-10-24 | Stop reason: HOSPADM

## 2023-10-24 RX ORDER — DEXAMETHASONE SODIUM PHOSPHATE 4 MG/ML
INJECTION, SOLUTION INTRA-ARTICULAR; INTRALESIONAL; INTRAMUSCULAR; INTRAVENOUS; SOFT TISSUE
Status: DISCONTINUED | OUTPATIENT
Start: 2023-10-24 | End: 2023-10-24

## 2023-10-24 RX ORDER — ONDANSETRON 2 MG/ML
4 INJECTION INTRAMUSCULAR; INTRAVENOUS ONCE AS NEEDED
Status: ACTIVE | OUTPATIENT
Start: 2023-10-24 | End: 2035-03-21

## 2023-10-24 RX ORDER — KETOROLAC TROMETHAMINE 30 MG/ML
15 INJECTION, SOLUTION INTRAMUSCULAR; INTRAVENOUS ONCE
Status: COMPLETED | OUTPATIENT
Start: 2023-10-24 | End: 2023-10-24

## 2023-10-24 RX ORDER — LIDOCAINE HYDROCHLORIDE 10 MG/ML
1 INJECTION, SOLUTION EPIDURAL; INFILTRATION; INTRACAUDAL; PERINEURAL ONCE
Status: ACTIVE | OUTPATIENT
Start: 2023-10-24

## 2023-10-24 RX ORDER — ONDANSETRON 2 MG/ML
INJECTION INTRAMUSCULAR; INTRAVENOUS
Status: DISCONTINUED | OUTPATIENT
Start: 2023-10-24 | End: 2023-10-24

## 2023-10-24 RX ORDER — FENTANYL CITRATE 50 UG/ML
INJECTION, SOLUTION INTRAMUSCULAR; INTRAVENOUS
Status: DISCONTINUED | OUTPATIENT
Start: 2023-10-24 | End: 2023-10-24

## 2023-10-24 RX ORDER — FENTANYL CITRATE 50 UG/ML
25 INJECTION, SOLUTION INTRAMUSCULAR; INTRAVENOUS EVERY 5 MIN PRN
Status: COMPLETED | OUTPATIENT
Start: 2023-10-24 | End: 2023-10-24

## 2023-10-24 RX ADMIN — ONDANSETRON 4 MG: 2 INJECTION INTRAMUSCULAR; INTRAVENOUS at 07:10

## 2023-10-24 RX ADMIN — FENTANYL CITRATE 50 MCG: 50 INJECTION, SOLUTION INTRAMUSCULAR; INTRAVENOUS at 07:10

## 2023-10-24 RX ADMIN — KETOROLAC TROMETHAMINE 15 MG: 30 INJECTION, SOLUTION INTRAMUSCULAR at 08:10

## 2023-10-24 RX ADMIN — FENTANYL CITRATE 25 MCG: 50 INJECTION, SOLUTION INTRAMUSCULAR; INTRAVENOUS at 07:10

## 2023-10-24 RX ADMIN — CLINDAMYCIN PHOSPHATE 900 MG: 18 INJECTION, SOLUTION INTRAVENOUS at 07:10

## 2023-10-24 RX ADMIN — SODIUM CHLORIDE, SODIUM GLUCONATE, SODIUM ACETATE, POTASSIUM CHLORIDE AND MAGNESIUM CHLORIDE: 526; 502; 368; 37; 30 INJECTION, SOLUTION INTRAVENOUS at 06:10

## 2023-10-24 RX ADMIN — OXYCODONE HYDROCHLORIDE 5 MG: 5 TABLET ORAL at 07:10

## 2023-10-24 RX ADMIN — SODIUM CHLORIDE, SODIUM GLUCONATE, SODIUM ACETATE, POTASSIUM CHLORIDE AND MAGNESIUM CHLORIDE: 526; 502; 368; 37; 30 INJECTION, SOLUTION INTRAVENOUS at 08:10

## 2023-10-24 RX ADMIN — PROPOFOL 150 MG: 10 INJECTION, EMULSION INTRAVENOUS at 07:10

## 2023-10-24 RX ADMIN — FENTANYL CITRATE 25 MCG: 50 INJECTION, SOLUTION INTRAMUSCULAR; INTRAVENOUS at 08:10

## 2023-10-24 RX ADMIN — MUPIROCIN: 20 OINTMENT TOPICAL at 05:10

## 2023-10-24 RX ADMIN — DEXAMETHASONE SODIUM PHOSPHATE 4 MG: 4 INJECTION, SOLUTION INTRA-ARTICULAR; INTRALESIONAL; INTRAMUSCULAR; INTRAVENOUS; SOFT TISSUE at 07:10

## 2023-10-24 RX ADMIN — LIDOCAINE HYDROCHLORIDE 100 MG: 20 INJECTION, SOLUTION INTRAVENOUS at 07:10

## 2023-10-24 RX ADMIN — MIDAZOLAM HYDROCHLORIDE 2 MG: 1 INJECTION, SOLUTION INTRAMUSCULAR; INTRAVENOUS at 06:10

## 2023-10-24 RX ADMIN — ACETAMINOPHEN 1000 MG: 10 INJECTION, SOLUTION INTRAVENOUS at 07:10

## 2023-10-24 NOTE — ANESTHESIA PROCEDURE NOTES
Intubation    Date/Time: 10/24/2023 7:05 AM    Performed by: Toney Hodge CRNA  Authorized by: Miguel Marie MD    Intubation:     Induction:  Intravenous    Intubated:  Postinduction    Mask Ventilation:  Not attempted    Attempts:  1    Attempted By:  CRNA    Difficult Airway Encountered?: No      Complications:  None    Airway Device:  Supraglottic airway/LMA    Airway Device Size:  3.0    Secured at:  The lips    Placement Verified By:  Capnometry    Complicating Factors:  None    Findings Post-Intubation:  BS equal bilateral and atraumatic/condition of teeth unchanged

## 2023-10-24 NOTE — DISCHARGE INSTRUCTIONS
1.Diet: Ice chips, clear liquids, and then diet as tolerated. Drink plenty of liquids.  2.Ice the area at least three times a day (20 minutes per session).  3.Elevate the extremity above the level of the heart to help reduce swelling.  4.Pain medication can be taken every four to six hours as needed. It is helpful to take pain medication prior to physical therapy.  Use Tylenol or anti-inflammatories for pain as much as possible.  Pain medication is for pain not responsive to over-the-counter medications.  5.Any activity that requires precise thinking or accuracy should be avoided for a minimum of 72 hours after surgery and while on narcotic pain medication. This includes operating machinery and/or driving a vehicle.  6.All sutures/staples will be removed approximately 14 days from the time of surgery. Leave steri-strips (skin tapes) in place until sutures are removed.  7. If skin glue is used instead of stitches, do not apply ointments or solutions to the incision. Keep the incision dry. The skin glue will peel off in 3-4 weeks.  8. Change dressing on the first post-op day. Use gauze for the first 3 days, then start using Band-Aids over the incision sites.   9. All casts, splints, braces, slings, crutches, abduction pillows, etc... Are to be worn as instructed. Crutches are just to be used as needed. If not needed for soreness or balance, may weight bear as tolerated without the crutches.  10. Keep the incision dry for 10-14 days. A waterproof dressing (purchase at [employer]orus, ebridge, etc) can be used to shower. No bath, pool, hot tub until instructed.  11. Call [phone] with any questions or concerns.          Discharge Instructions: After Your Surgery/Procedure  Youve just had surgery. During surgery you were given medicine called anesthesia to keep you relaxed and free of pain. After surgery you may have some pain or nausea. This is common. Here are some tips for feeling better and getting well after surgery.    "  Stay on schedule with your medication.   Going home  Your doctor or nurse will show you how to take care of yourself when you go home. He or she will also answer your questions. Have an adult family member or friend drive you home.      For your safety we recommend these precaution for the first 24 hours after your procedure:  Do not drive or use heavy equipment.  Do not make important decisions or sign legal papers.  Do not drink alcohol.  Have someone stay with you, if needed. He or she can watch for problems and help keep you safe.  Your concentration, balance, coordination, and judgement may be impaired for many hours after anesthesia.  Use caution when ambulating or standing up.     You may feel weak and "washed out" after anesthesia and surgery.      Subtle residual effects of general anesthesia or sedation with regional / local anesthesia can last more than 24 hours.  Rest for the remainder of the day or longer if your Doctor/Surgeon has advised you to do so.  Although you may feel normal within the first 24 hours, your reflexes and mental ability may be impaired without you realizing it.  You may feel dizzy, lightheaded or sleepy for 24 hours or longer.      Be sure to go to all follow-up visits with your doctor. And rest after your surgery for as long as your doctor tells you to.  Coping with pain  If you have pain after surgery, pain medicine will help you feel better. Take it as told, before pain becomes severe. Also, ask your doctor or pharmacist about other ways to control pain. This might be with heat, ice, or relaxation. And follow any other instructions your surgeon or nurse gives you.  Tips for taking pain medicine  To get the best relief possible, remember these points:  Pain medicines can upset your stomach. Taking them with a little food may help.  Most pain relievers taken by mouth need at least 20 to 30 minutes to start to work.  Taking medicine on a schedule can help you remember to take it. " Try to time your medicine so that you can take it before starting an activity. This might be before you get dressed, go for a walk, or sit down for dinner.  Constipation is a common side effect of pain medicines. Call your doctor before taking any medicines such as laxatives or stool softeners to help ease constipation. Also ask if you should skip any foods. Drinking lots of fluids and eating foods such as fruits and vegetables that are high in fiber can also help. Remember, do not take laxatives unless your surgeon has prescribed them.  Drinking alcohol and taking pain medicine can cause dizziness and slow your breathing. It can even be deadly. Do not drink alcohol while taking pain medicine.  Pain medicine can make you react more slowly to things. Do not drive or run machinery while taking pain medicine.  Your health care provider may tell you to take acetaminophen to help ease your pain. Ask him or her how much you are supposed to take each day. Acetaminophen or other pain relievers may interact with your prescription medicines or other over-the-counter (OTC) drugs. Some prescription medicines have acetaminophen and other ingredients. Using both prescription and OTC acetaminophen for pain can cause you to overdose. Read the labels on your OTC medicines with care. This will help you to clearly know the list of ingredients, how much to take, and any warnings. It may also help you not take too much acetaminophen. If you have questions or do not understand the information, ask your pharmacist or health care provider to explain it to you before you take the OTC medicine.  Managing nausea  Some people have an upset stomach after surgery. This is often because of anesthesia, pain, or pain medicine, or the stress of surgery. These tips will help you handle nausea and eat healthy foods as you get better. If you were on a special food plan before surgery, ask your doctor if you should follow it while you get better. These  tips may help:  Do not push yourself to eat. Your body will tell you when to eat and how much.  Start off with clear liquids and soup. They are easier to digest.  Next try semi-solid foods, such as mashed potatoes, applesauce, and gelatin, as you feel ready.  Slowly move to solid foods. Dont eat fatty, rich, or spicy foods at first.  Do not force yourself to have 3 large meals a day. Instead eat smaller amounts more often.  Take pain medicines with a small amount of solid food, such as crackers or toast, to avoid nausea.     Call your surgeon if  You still have pain an hour after taking medicine. The medicine may not be strong enough.  You feel too sleepy, dizzy, or groggy. The medicine may be too strong.  You have side effects like nausea, vomiting, or skin changes, such as rash, itching, or hives.       If you have obstructive sleep apnea  You were given anesthesia medicine during surgery to keep you comfortable and free of pain. After surgery, you may have more apnea spells because of this medicine and other medicines you were given. The spells may last longer than usual.   At home:  Keep using the continuous positive airway pressure (CPAP) device when you sleep. Unless your health care provider tells you not to, use it when you sleep, day or night. CPAP is a common device used to treat obstructive sleep apnea.  Talk with your provider before taking any pain medicine, muscle relaxants, or sedatives. Your provider will tell you about the possible dangers of taking these medicines.  © 1898-4758 [employer]. [address] [place] [address], [place] [address]. All rights reserved. This information is not intended as a substitute for professional medical care. Always follow your healthcare professional's instructions.           Using an Incentive Spirometer    An incentive spirometer is a device that helps you do deep breathing exercises. These exercises expand your lungs, aid in circulation, and help prevent  pneumonia. Deep breathing exercises also help you breathe better and improve the function of your lungs by:  Keeping your lungs clear  Strengthening your breathing muscles  Helping prevent respiratory complications or problems  The incentive spirometer gives you a way to take an active part in recover. A nurse or therapist will teach you breathing exercises. To do these exercises, you will breathe in through your mouth and not your nose. The incentive spirometer only works correctly if you breathe in through your mouth.  Steps to clear lungs  Step 1. Exhale normally. Then, inhale normally.  Relax and breathe out.  Step 2. Place your lips tightly around the mouthpiece.  Make sure the device is upright and not tilted.  Step 3. Inhale as much air as you can through the mouthpiece (don't breath through your nose).  Inhale slowly and deeply.  Hold your breath long enough to keep the balls or disk raised for at least 3 to 5 seconds, or as instructed by your healthcare provider.  Some spirometers have an indicator to let you know that you are breathing in too fast. If the indicator goes off, breathe in more slowly.  Step 4. Repeat the exercise regularly.  Do this exercise every hour while you're awake, or as instructed by your healthcare provider.  If you were taught deep breathing and coughing exercises, do them regularly as instructed by your healthcare provider.            Post op instructions for prevention of DVT  What is deep vein thrombosis?  Deep vein thrombosis (DVT) is the medical term for blood clots in the deep veins of the leg.  These blood clots can be dangerous.  A DVT can block a blood vessel and keep blood from getting where it needs to go.  Another problem is that the clot can travel to other parts of the body such as the lungs.  A clot that travels to the lungs is called a pulmonary embolus (PE) and can cause serious problems with breathing which can lead to death.  Am I at risk for DVT/PE?  If you are  not very active, you are at risk of DVT.  Anyone confined to bed, sitting for long periods of time, recovering from surgery, etc. increases the risk of DVT.  Other risk factors are cancer diagnosis, certain medications, estrogen replacement in any form,older age, obesity, pregnancy, smoking, history of clotting disorders, and dehydration.  How will I know if I have a DVT?  Swelling in the lower leg  Pain  Warmth, redness, hardness or bulging of the vein  If you have any of these symptoms, call your doctors office right away.  Some people will not have any symptoms until the clot moves to the lungs.  What are the symptoms of a PE?  Panting, shortness of breath, or trouble breathing  Sharp, knife-like chest pain when you breathe  Coughing or coughing up blood  Rapid heartbeat  If you have any of these symptoms or get worse quickly, call 911 for emergency treatment.  How can I prevent a DVT?  Avoid long periods of inactivity and dont cross your legs--get up and walk around every hour or so.  Stay active--walking after surgery is highly encouraged.  This means you should get out of the house and walk in the neighborhood.  Going up and down stairs will not impair healing (unless advised against such activity by your doctor).    Drink plenty of noncaffeinated, nonalcoholic fluids each day to prevent dehydration.  Wear special support stockings, if they have been advised by your doctor.  If you travel, stop at least once an hour and walk around.  Avoid smoking (assistance with stopping is available through your healthcare provider)  Always notify your doctor if you are not able to follow the post operative instructions that are given to you at the time of discharge.  It may be necessary to prescribe one of the medications available to prevent DVT.           We hope your stay was comfortable as you heal now, mend and rest.    For we have enjoyed taking care of you by giving your our best.    And as you get better, by  regaining your health and strength;   We count it as a privilege to have served you and hope your time at [facility] was well spent.      Thank  You!!!

## 2023-10-24 NOTE — PLAN OF CARE
"Discharge instructions noted, pt WBAT. Pt reports she cannot use crutches because of her hx with her R shoulder, states she has a cane that she will use post-op. Dr. Luna notified, OK per MD. Dr. Luna also states "she doesn't have to use either one if she doesn't want". Updated pt and pt's spouse, pt and pt's spouse verbalized understanding.   "

## 2023-10-24 NOTE — TRANSFER OF CARE
"Anesthesia Transfer of Care Note    Patient: Gil Cueva    Procedure(s) Performed: Procedure(s) (LRB):  ARTHROSCOPY, KNEE, WITH MENISCECTOMY (Right)    Patient location: PACU    Anesthesia Type: general    Transport from OR: Transported from OR on 6-10 L/min O2 by face mask with adequate spontaneous ventilation    Post pain: adequate analgesia    Post assessment: no apparent anesthetic complications    Post vital signs: stable    Level of consciousness: sedated    Nausea/Vomiting: no nausea/vomiting    Complications: none    Transfer of care protocol was followed      Last vitals:   Visit Vitals  BP (!) 142/77 (BP Location: Right arm, Patient Position: Lying)   Pulse 81   Temp 36.4 °C (97.6 °F) (Oral)   Resp 18   Ht 5' 7" (1.702 m)   Wt 86.2 kg (190 lb)   SpO2 99%   Breastfeeding No   BMI 29.76 kg/m²     "

## 2023-10-24 NOTE — DISCHARGE SUMMARY
[facility]  [facility]  Short Stay    Procedure(s) (LRB):  ARTHROSCOPY, KNEE, WITH MENISCECTOMY (Right)      OUTCOME: Patient tolerated treatment/procedure well without complication and is now ready for discharge.    DISPOSITION: Home or Self Care    FINAL DIAGNOSIS:  <principal problem not specified>    FOLLOWUP: In clinic    DISCHARGE INSTRUCTIONS:    Discharge Procedure Orders   Diet Adult Regular     Ice to affected area     Remove dressing in 24 hours     Change dressing (specify)   Order Comments: Dressing change: One time per day using Waterproof Bandaids.     Activity as tolerated     Shower on day dressing removed (No bath)        TIME SPENT ON DISCHARGE: 5 minutes

## 2023-10-24 NOTE — ANESTHESIA PREPROCEDURE EVALUATION
10/24/2023  Gil Cueva is a 58 y.o., female.      Pre-op Assessment    I have reviewed the NPO Status.   I have reviewed the Medications.     Review of Systems  Anesthesia Hx:  No problems with previous Anesthesia    Social:  Non-Smoker    Cardiovascular:   Exercise tolerance: good ECG has been reviewed.    Pulmonary:  Pulmonary Normal    Renal/:  Renal/ Normal     Hepatic/GI:  Hepatic/GI Normal    Musculoskeletal:   Arthritis     Endocrine:  Obesity / BMI > 30  Psych:   Psychiatric History          Physical Exam  General: Well nourished    Airway:  Mallampati: II   Mouth Opening: Normal  TM Distance: Normal  Tongue: Normal  Neck ROM: Normal ROM    Dental:  Intact    Chest/Lungs:  Clear to auscultation, Normal Respiratory Rate        Anesthesia Plan  Type of Anesthesia, risks & benefits discussed:    Anesthesia Type: Gen ETT  Intra-op Monitoring Plan: Standard ASA Monitors  Post Op Pain Control Plan: multimodal analgesia and IV/PO Opioids PRN  Induction:  IV  Airway Plan: Direct, Post-Induction  Informed Consent: Informed consent signed with the Patient and all parties understand the risks and agree with anesthesia plan.  All questions answered. Patient consented to blood products? No  ASA Score: 3    Ready For Surgery From Anesthesia Perspective.     .

## 2023-10-24 NOTE — PLAN OF CARE
Patient prepared for surgery. Surgical and anesthesia consents signed. Belongings in pre-op locker. Text message notifications set up with spouse.

## 2023-10-24 NOTE — OP NOTE
[facility]  Orthopedic Surgery  Operative Note    SUMMARY     Date of Procedure: 10/24/2023     Procedure: Procedure(s) (LRB):  ARTHROSCOPY, KNEE, WITH partial lateral MENISCECTOMY (Right)     Right lateral femoral condyle chondroplasty    Surgeon(s) and Role:     * Nicanor Luna MD - Primary    Assistant: David WASHINGTON    Pre-Operative Diagnosis: Osteochondral lesion [M89.9, M94.9]  Pre-op exam [Z01.818]    Post-Operative Diagnosis: Post-Op Diagnosis Codes:     * Osteochondral lesion [M89.9, M94.9]  Free edge tear of the anterior horn lateral meniscus    Anesthesia: General    Diagnostic arthroscopy findings: Diagnostic arthroscopy findings, the patient's medial compartment was thoroughly examined. The patient had no cartilage wear and no distinct meniscal tear. ACL and PCL were both intact with   good tension. Lateral compartment showed n some free edge tearing anteriorly.  There was some softening of the tibial plateau that measured maybe grade 2.  Patient also had a full-thickness osteochondral lesion of the more anterior portion of the lateral femoral condyle that measured 1 centimeter in length by 1-1/2 centimeters in width.  In the patellofemoral joint, the patient had good central tracking without tilt and some cracking of the patellar cartilage    Complications: No    Estimated Blood Loss (EBL): 2ml    Tourniquet Time: 10min at 300mmHg           Implants: * No implants in log *    Specimens:   Specimen (24h ago, onward)      None                    Condition: Good    Disposition: PACU - hemodynamically stable.    Attestation: I was present and scrubbed for the entire procedure.    INDICATIONS FOR THE PROCEDURE:  58-year-old female seen for right knee pain.  Patient had failed conservative measures and had an MRI which showed possible osteochondral lesion.  Patient underwent staging arthroscopy to evaluate the lesion and determine the best next step for cartilage  restoration.    PROCEDURE IN DETAIL: Risks, benefits and alternatives of the procedure were   explained to the patient including, but not limited to damage to nerves,   arteries, blood vessels. Also explained risk of infection, DVT, PE, continued pain due to arthritis,  as well as   anesthetic complications including seizure, stroke, heart attack and death. They  understood this and signed informed consent. The patient's Right knee was marked prior to coming to the Operating Room. Once there a formal   timeout was done in which correct patient, procedure and op site were all   correctly identified and confirmed by the entire operating team.  Appropriate preoperative antibiotic was   given prior to surgical incision. Laryngeal mask anesthesia was induced. The   patient's Right lower extremity was prepped and draped in normal sterile fashion.   Leg was then exsanguinated with a tourniquet and tourniquet was inflated up   300 mmHg. Standard inferior lateral portal was then made. A spinal needle was   used to localize an inferior medial portal and this was made under direct   arthroscopic visualization. Diagnostic arthroscopy was then performed with the   findings listed above. Shaver was used to remove redundant fat pad and   Synovium within the notch. 3.5mm full radius shaver was used to perform a partial lateral menisectomy back to stable healthy appearing tissue.  Chondroplasty was used to remove loose chondral tissue around the osteochondral lesion.  Calibrated probe was used to measure the osteochondral lesion.      Proceeded with closing. All   excess water was removed from the knee joint. Portals were closed using   nylons. Portal was then injected with 0.25% Marcaine with epinephrine. Sterile   dressing was then applied. They were then extubated and awakened and transferred   from the Operating Room to the Recovery Room in stable condition.     Postop course is for an arthroscopic partial lateral meniscectomy  with chondroplasty.

## 2023-10-24 NOTE — PLAN OF CARE
VSS. NAD. Resp E/U. Calm and cooperative. Speech appropriate. Tolerating clear liquids. Denies nausea. Pain tolerable. IV patent and SL. No swelling or redness. Dressing to Rt knee CDI. Family notified of patient status. All safety measures taken. Bed in low position. Bedrails up x2. Bed locked. Cleared by Anesthesia.

## 2023-10-24 NOTE — ANESTHESIA POSTPROCEDURE EVALUATION
Anesthesia Post Evaluation    Patient: Gil Cueva    Procedure(s) Performed: Procedure(s) (LRB):  ARTHROSCOPY, KNEE, WITH MENISCECTOMY (Right)    Final Anesthesia Type: general      Patient location during evaluation: PACU  Patient participation: Yes- Able to Participate  Level of consciousness: awake and alert and oriented  Post-procedure vital signs: reviewed and stable  Pain management: adequate  Airway patency: patent  THOMAS mitigation strategies: Multimodal analgesia and Extubation while patient is awake  PONV status at discharge: No PONV  Anesthetic complications: no      Cardiovascular status: blood pressure returned to baseline  Respiratory status: unassisted, spontaneous ventilation and room air  Hydration status: euvolemic  Follow-up not needed.          Vitals Value Taken Time   /69 10/24/23 0900   Temp 36.4 °C (97.5 °F) 10/24/23 0825   Pulse 66 10/24/23 0900   Resp 16 10/24/23 0900   SpO2 100 % 10/24/23 0900         Event Time   Out of Recovery 08:36:00         Pain/Francis Score: Pain Rating Prior to Med Admin: 7 (10/24/2023  8:24 AM)  Pain Rating Post Med Admin: 6 (10/24/2023  8:00 AM)  Francis Score: 10 (10/24/2023  8:30 AM)  Modified Francis Score: 19 (10/24/2023  9:10 AM)

## 2023-10-24 NOTE — PLAN OF CARE
"Pt awake, alert, oriented. Vital signs stable. Pt reports she is "ready to go". Discharge instructions reviewed with pt and pt's spouse. Pt and pt's spouse verbalized understanding. IV removed, catheter intact. Dressing to R knee clean, dry, and intact. All belongings returned to patient. Pt transferred to car via wheelchair per linda Noriega. Safety maintained.    "

## 2023-11-06 ENCOUNTER — OFFICE VISIT (OUTPATIENT)
Dept: DERMATOLOGY | Facility: CLINIC | Age: 58
End: 2023-11-06
Payer: MEDICARE

## 2023-11-06 ENCOUNTER — OFFICE VISIT (OUTPATIENT)
Dept: ORTHOPEDICS | Facility: CLINIC | Age: 58
End: 2023-11-06
Payer: MEDICARE

## 2023-11-06 VITALS — HEIGHT: 67 IN | BODY MASS INDEX: 29.82 KG/M2 | WEIGHT: 190 LBS | RESPIRATION RATE: 18 BRPM

## 2023-11-06 VITALS — WEIGHT: 190 LBS | BODY MASS INDEX: 29.82 KG/M2 | HEIGHT: 67 IN

## 2023-11-06 DIAGNOSIS — L82.1 SEBORRHEIC KERATOSES: ICD-10-CM

## 2023-11-06 DIAGNOSIS — M89.9 OSTEOCHONDRAL LESION: Primary | ICD-10-CM

## 2023-11-06 DIAGNOSIS — M94.9 OSTEOCHONDRAL LESION: Primary | ICD-10-CM

## 2023-11-06 DIAGNOSIS — D18.01 CHERRY ANGIOMA: ICD-10-CM

## 2023-11-06 DIAGNOSIS — Z12.83 SKIN CANCER SCREENING: ICD-10-CM

## 2023-11-06 DIAGNOSIS — D22.9 MULTIPLE BENIGN NEVI: ICD-10-CM

## 2023-11-06 DIAGNOSIS — D48.5 NEOPLASM OF UNCERTAIN BEHAVIOR OF SKIN: Primary | ICD-10-CM

## 2023-11-06 DIAGNOSIS — L82.0 INFLAMED SEBORRHEIC KERATOSIS: ICD-10-CM

## 2023-11-06 PROCEDURE — 1159F PR MEDICATION LIST DOCUMENTED IN MEDICAL RECORD: ICD-10-PCS | Mod: CPTII,S$GLB,, | Performed by: ORTHOPAEDIC SURGERY

## 2023-11-06 PROCEDURE — 99203 OFFICE O/P NEW LOW 30 MIN: CPT | Mod: 25,S$GLB,, | Performed by: DERMATOLOGY

## 2023-11-06 PROCEDURE — 1160F PR REVIEW ALL MEDS BY PRESCRIBER/CLIN PHARMACIST DOCUMENTED: ICD-10-PCS | Mod: CPTII,S$GLB,, | Performed by: ORTHOPAEDIC SURGERY

## 2023-11-06 PROCEDURE — 1159F PR MEDICATION LIST DOCUMENTED IN MEDICAL RECORD: ICD-10-PCS | Mod: CPTII,S$GLB,, | Performed by: DERMATOLOGY

## 2023-11-06 PROCEDURE — 99203 PR OFFICE/OUTPT VISIT, NEW, LEVL III, 30-44 MIN: ICD-10-PCS | Mod: 25,S$GLB,, | Performed by: DERMATOLOGY

## 2023-11-06 PROCEDURE — 17110 PR DESTRUCTION BENIGN LESIONS UP TO 14: ICD-10-PCS | Mod: S$GLB,,, | Performed by: DERMATOLOGY

## 2023-11-06 PROCEDURE — 1160F RVW MEDS BY RX/DR IN RCRD: CPT | Mod: CPTII,S$GLB,, | Performed by: DERMATOLOGY

## 2023-11-06 PROCEDURE — 17110 DESTRUCTION B9 LES UP TO 14: CPT | Mod: S$GLB,,, | Performed by: DERMATOLOGY

## 2023-11-06 PROCEDURE — 99999 PR PBB SHADOW E&M-EST. PATIENT-LVL II: CPT | Mod: PBBFAC,,, | Performed by: ORTHOPAEDIC SURGERY

## 2023-11-06 PROCEDURE — 99024 PR POST-OP FOLLOW-UP VISIT: ICD-10-PCS | Mod: S$GLB,,, | Performed by: ORTHOPAEDIC SURGERY

## 2023-11-06 PROCEDURE — 1159F MED LIST DOCD IN RCRD: CPT | Mod: CPTII,S$GLB,, | Performed by: DERMATOLOGY

## 2023-11-06 PROCEDURE — 99999 PR PBB SHADOW E&M-EST. PATIENT-LVL II: ICD-10-PCS | Mod: PBBFAC,,, | Performed by: ORTHOPAEDIC SURGERY

## 2023-11-06 PROCEDURE — 3008F PR BODY MASS INDEX (BMI) DOCUMENTED: ICD-10-PCS | Mod: CPTII,S$GLB,, | Performed by: DERMATOLOGY

## 2023-11-06 PROCEDURE — 1160F RVW MEDS BY RX/DR IN RCRD: CPT | Mod: CPTII,S$GLB,, | Performed by: ORTHOPAEDIC SURGERY

## 2023-11-06 PROCEDURE — 99024 POSTOP FOLLOW-UP VISIT: CPT | Mod: S$GLB,,, | Performed by: ORTHOPAEDIC SURGERY

## 2023-11-06 PROCEDURE — 1160F PR REVIEW ALL MEDS BY PRESCRIBER/CLIN PHARMACIST DOCUMENTED: ICD-10-PCS | Mod: CPTII,S$GLB,, | Performed by: DERMATOLOGY

## 2023-11-06 PROCEDURE — 3008F BODY MASS INDEX DOCD: CPT | Mod: CPTII,S$GLB,, | Performed by: DERMATOLOGY

## 2023-11-06 PROCEDURE — 1159F MED LIST DOCD IN RCRD: CPT | Mod: CPTII,S$GLB,, | Performed by: ORTHOPAEDIC SURGERY

## 2023-11-06 RX ORDER — ASPIRIN 81 MG/1
81 TABLET ORAL 2 TIMES DAILY
COMMUNITY
End: 2024-01-18

## 2023-11-06 NOTE — PATIENT INSTRUCTIONS

## 2023-11-06 NOTE — PROGRESS NOTES
Past Medical History:   Diagnosis Date    Breast cancer     General anesthetics causing adverse effect in therapeutic use     woke up during ankle surgery    PONV (postoperative nausea and vomiting)        Past Surgical History:   Procedure Laterality Date    ANKLE SURGERY Right     2016    BREAST LUMPECTOMY Left 2008    CERVICAL FUSION N/A 20003    c-3, 4, 5    KNEE ARTHROSCOPY W/ MENISCECTOMY Right 10/24/2023    Procedure: ARTHROSCOPY, KNEE, WITH MENISCECTOMY;  Surgeon: Nicanor Luna MD;  Location: [facility];  Service: Orthopedics;  Laterality: Right;  lateral meniscectomy    ROTATOR CUFF REPAIR Right 2016    Dr. Perez       Current Outpatient Medications   Medication Sig    aspirin (ECOTRIN) 81 MG EC tablet Take 81 mg by mouth 2 (two) times a day.    aspirin-acetaminophen-caffeine 250-250-65 mg (EXCEDRIN MIGRAINE) 250-250-65 mg per tablet Take 1 tablet by mouth every 6 (six) hours as needed for Pain.    ibuprofen (ADVIL,MOTRIN) 200 MG tablet Take 200 mg by mouth every 6 (six) hours as needed for Pain.    meloxicam (MOBIC) 15 MG tablet Take 15 mg by mouth once daily.     Current Facility-Administered Medications   Medication    cyanocobalamin injection 1,000 mcg     Facility-Administered Medications Ordered in Other Visits   Medication    electrolyte-S (ISOLYTE)    electrolyte-S (ISOLYTE)    LIDOcaine (PF) 10 mg/ml (1%) injection 10 mg    ondansetron injection 4 mg       Review of patient's allergies indicates:   Allergen Reactions    Penicillins      I have no idea, my mother told me that  My mom said they gave it to me as a baby and it almost killed me    Sulfa (sulfonamide antibiotics)      I have no idea, my mother told me that    My mom said she is allergic to it so I am allergic to it       Family History   Problem Relation Age of Onset    Breast cancer Maternal Aunt     Melanoma Maternal Grandfather        Social History     Socioeconomic History    Marital status:     Number of children:  1   Tobacco Use    Smoking status: Never    Smokeless tobacco: Never   Substance and Sexual Activity    Alcohol use: Yes    Drug use: No    Sexual activity: Not Currently     Social Determinants of Health     Financial Resource Strain: Medium Risk (10/31/2023)    Overall Financial Resource Strain (CARDIA)     Difficulty of Paying Living Expenses: Somewhat hard   Food Insecurity: No Food Insecurity (10/31/2023)    Hunger Vital Sign     Worried About Running Out of Food in the Last Year: Never true     Ran Out of Food in the Last Year: Never true   Transportation Needs: No Transportation Needs (10/31/2023)    PRAPARE - Transportation     Lack of Transportation (Medical): No     Lack of Transportation (Non-Medical): No   Physical Activity: Sufficiently Active (10/31/2023)    Exercise Vital Sign     Days of Exercise per Week: 3 days     Minutes of Exercise per Session: 110 min   Stress: Stress Concern Present (10/31/2023)    [place] [employer] - Occupational Stress Questionnaire     Feeling of Stress : To some extent   Social Connections: Moderately Integrated (10/31/2023)    Social Connection and Isolation Panel [NHANES]     Frequency of Communication with Friends and Family: More than three times a week     Frequency of Social Gatherings with Friends and Family: Twice a week     Attends Yarsani Services: Never     Active Member of Clubs or Organizations: Yes     Attends Club or Organization Meetings: More than 4 times per year     Marital Status:    Housing Stability: Unknown (10/31/2023)    Housing Stability Vital Sign     Unable to Pay for Housing in the Last Year: Patient refused     Number of Places Lived in the Last Year: 1     Unstable Housing in the Last Year: No       Chief Complaint:   Chief Complaint   Patient presents with    Post-op Evaluation       Date of surgery:  October 24, 2023    History of present illness:  58-year-old female underwent right knee arthroscopy for a  significant lateral femoral condyle lesion.  Patient had underwent a chondroplasty.  She is doing better.  Has the sharp pain that is gone now.  Starting to go the gym.      Review of Systems:    Musculoskeletal:  See HPI        Physical Examination:    Vital Signs:    Vitals:    11/06/23 1303   Resp: 18       Body mass index is 29.76 kg/m².    This a well-developed, well nourished patient in no acute distress.  They are alert and oriented and cooperative to examination.  Pt. walks without an antalgic gait.      Examination of the right knee shows well-healing surgical incisions.  No erythema drainage.  Patient has full range of motion.  No calf pain.    X-rays:  None     Assessment::  Status post right lateral femoral condyle chondroplasty    Plan:  I reviewed the findings with her and her  today.  We talked about rehab.  Patient elected to do a home exercise program.  We talked about the chondroplasty and more advanced cartilage procedures in the future if needed.  We talked about hyaluronic acid.  Follow-up in a month.    This note was created using [employer] voice recognition software that occasionally misinterpreted phrases or words.

## 2023-11-06 NOTE — PROGRESS NOTES
Subjective:      Patient ID:  Gil Cueva is a 58 y.o. female who presents for   Chief Complaint   Patient presents with    Skin Check     TBSC      New Patient    Patient here today for TBSC  C/O spot to back, irritated by bra and bleeds occasionally.     Derm Hx:  Denies Phx of NMSC  Fhx of MM- Maternal grandfather    Current Outpatient Medications:   ·  aspirin (ECOTRIN) 81 MG EC tablet, Take 81 mg by mouth 2 (two) times a day., Disp: , Rfl:   ·  meloxicam (MOBIC) 15 MG tablet, Take 15 mg by mouth once daily., Disp: , Rfl:   ·  aspirin-acetaminophen-caffeine 250-250-65 mg (EXCEDRIN MIGRAINE) 250-250-65 mg per tablet, Take 1 tablet by mouth every 6 (six) hours as needed for Pain., Disp: , Rfl:   ·  ibuprofen (ADVIL,MOTRIN) 200 MG tablet, Take 200 mg by mouth every 6 (six) hours as needed for Pain., Disp: , Rfl:           Review of Systems   Constitutional:  Negative for fever, chills and fatigue.   Skin:  Positive for daily sunscreen use and activity-related sunscreen use.       Objective:   Physical Exam   Constitutional: She appears well-developed and well-nourished. No distress.   Neurological: She is alert and oriented to person, place, and time. She is not disoriented.   Psychiatric: She has a normal mood and affect.   Skin:   Areas Examined (abnormalities noted in diagram):   Scalp / Hair Palpated and Inspected  Head / Face Inspection Performed  Neck Inspection Performed  Chest / Axilla Inspection Performed  Abdomen Inspection Performed  Genitals / Buttocks / Groin Inspection Performed  Back Inspection Performed  RUE Inspected  LUE Inspection Performed  RLE Inspected  LLE Inspection Performed  Nails and Digits Inspection Performed                         Diagram Legend     Erythematous scaling macule/papule c/w actinic keratosis       Vascular papule c/w angioma      Pigmented verrucoid papule/plaque c/w seborrheic keratosis      Yellow umbilicated papule c/w sebaceous hyperplasia      Irregularly  shaped tan macule c/w lentigo     1-2 mm smooth white papules consistent with Milia      Movable subcutaneous cyst with punctum c/w epidermal inclusion cyst      Subcutaneous movable cyst c/w pilar cyst      Firm pink to brown papule c/w dermatofibroma      Pedunculated fleshy papule(s) c/w skin tag(s)      Evenly pigmented macule c/w junctional nevus     Mildly variegated pigmented, slightly irregular-bordered macule c/w mildly atypical nevus      Flesh colored to evenly pigmented papule c/w intradermal nevus       Pink pearly papule/plaque c/w basal cell carcinoma      Erythematous hyperkeratotic cursted plaque c/w SCC      Surgical scar with no sign of skin cancer recurrence      Open and closed comedones      Inflammatory papules and pustules      Verrucoid papule consistent consistent with wart     Erythematous eczematous patches and plaques     Dystrophic onycholytic nail with subungual debris c/w onychomycosis     Umbilicated papule    Erythematous-base heme-crusted tan verrucoid plaque consistent with inflamed seborrheic keratosis     Erythematous Silvery Scaling Plaque c/w Psoriasis     See annotation      Assessment / Plan:        Neoplasm of uncertain behavior of skin  Midline chest  Monitor for resolution  1.5 week duration    Inflamed seborrheic keratosis  Cryosurgery procedure note:    Verbal consent from the patient is obtained. Liquid nitrogen cryosurgery is applied to 10 lesions to produce a freeze injury. The patient is aware that blisters may form and is instructed on wound care with gentle cleansing and use of vaseline ointment to keep moist until healed. The patient is supplied a handout on cryosurgery and is instructed to call if lesions do not completely resolve.    Seborrheic keratoses  These are benign inherited growths without a malignant potential. Reassurance given to patient. No treatment is necessary.     Multiple benign nevi  Careful dermoscopy evaluation of nevi performed with none  identified as needing biopsy today  Monitor for new mole or moles that are becoming bigger, darker, irritated, or developing irregular borders.     Cherry angioma  This is a benign vascular lesion. Reassurance given. No treatment required.     Skin cancer screening  Total body skin examination performed today including at least 12 points as noted in physical examination. No lesions suspicious for malignancy noted.    Patient instructed in importance in daily broad spectrum sun protection of at least spf 30. Mineral sunscreen ingredients preferred (Zinc +/- Titanium) and can be found OTC.   Recommend Elta MD for daily use on face and neck.  Patient encouraged to wear hat for all outdoor exposure.   Also discussed sun avoidance and use of protective clothing.           Follow up in about 1 year (around 11/6/2024), or if symptoms worsen or fail to improve.

## 2023-11-28 ENCOUNTER — HOSPITAL ENCOUNTER (OUTPATIENT)
Dept: RADIOLOGY | Facility: HOSPITAL | Age: 58
Discharge: HOME | End: 2023-11-28
Attending: NEUROLOGICAL SURGERY
Payer: MEDICARE

## 2023-11-28 ENCOUNTER — OFFICE VISIT (OUTPATIENT)
Dept: NEUROSURGERY | Facility: CLINIC | Age: 58
End: 2023-11-28
Payer: MEDICARE

## 2023-11-28 VITALS
DIASTOLIC BLOOD PRESSURE: 100 MMHG | WEIGHT: 190.06 LBS | SYSTOLIC BLOOD PRESSURE: 149 MMHG | HEART RATE: 96 BPM | BODY MASS INDEX: 29.83 KG/M2 | HEIGHT: 67 IN

## 2023-11-28 DIAGNOSIS — G93.9 BRAIN LESION: ICD-10-CM

## 2023-11-28 DIAGNOSIS — C50.112 MALIGNANT NEOPLASM OF CENTRAL PORTION OF LEFT BREAST IN FEMALE, ESTROGEN RECEPTOR POSITIVE: Primary | Chronic | ICD-10-CM

## 2023-11-28 DIAGNOSIS — Z17.0 MALIGNANT NEOPLASM OF CENTRAL PORTION OF LEFT BREAST IN FEMALE, ESTROGEN RECEPTOR POSITIVE: Primary | Chronic | ICD-10-CM

## 2023-11-28 PROCEDURE — 1159F PR MEDICATION LIST DOCUMENTED IN MEDICAL RECORD: ICD-10-PCS | Mod: CPTII,S$GLB,, | Performed by: NEUROLOGICAL SURGERY

## 2023-11-28 PROCEDURE — 99999 PR PBB SHADOW E&M-EST. PATIENT-LVL III: CPT | Mod: PBBFAC,,, | Performed by: NEUROLOGICAL SURGERY

## 2023-11-28 PROCEDURE — 99999 PR PBB SHADOW E&M-EST. PATIENT-LVL III: ICD-10-PCS | Mod: PBBFAC,,, | Performed by: NEUROLOGICAL SURGERY

## 2023-11-28 PROCEDURE — 25500020 PHARM REV CODE 255: Performed by: NEUROLOGICAL SURGERY

## 2023-11-28 PROCEDURE — 1160F PR REVIEW ALL MEDS BY PRESCRIBER/CLIN PHARMACIST DOCUMENTED: ICD-10-PCS | Mod: CPTII,S$GLB,, | Performed by: NEUROLOGICAL SURGERY

## 2023-11-28 PROCEDURE — 3080F DIAST BP >= 90 MM HG: CPT | Mod: CPTII,S$GLB,, | Performed by: NEUROLOGICAL SURGERY

## 2023-11-28 PROCEDURE — 99214 OFFICE O/P EST MOD 30 MIN: CPT | Mod: S$GLB,,, | Performed by: NEUROLOGICAL SURGERY

## 2023-11-28 PROCEDURE — 3008F BODY MASS INDEX DOCD: CPT | Mod: CPTII,S$GLB,, | Performed by: NEUROLOGICAL SURGERY

## 2023-11-28 PROCEDURE — 1160F RVW MEDS BY RX/DR IN RCRD: CPT | Mod: CPTII,S$GLB,, | Performed by: NEUROLOGICAL SURGERY

## 2023-11-28 PROCEDURE — 3080F PR MOST RECENT DIASTOLIC BLOOD PRESSURE >= 90 MM HG: ICD-10-PCS | Mod: CPTII,S$GLB,, | Performed by: NEUROLOGICAL SURGERY

## 2023-11-28 PROCEDURE — 3077F PR MOST RECENT SYSTOLIC BLOOD PRESSURE >= 140 MM HG: ICD-10-PCS | Mod: CPTII,S$GLB,, | Performed by: NEUROLOGICAL SURGERY

## 2023-11-28 PROCEDURE — 70553 MRI BRAIN W WO CONTRAST: ICD-10-PCS | Mod: 26,,, | Performed by: STUDENT IN AN ORGANIZED HEALTH CARE EDUCATION/TRAINING PROGRAM

## 2023-11-28 PROCEDURE — 1159F MED LIST DOCD IN RCRD: CPT | Mod: CPTII,S$GLB,, | Performed by: NEUROLOGICAL SURGERY

## 2023-11-28 PROCEDURE — 3077F SYST BP >= 140 MM HG: CPT | Mod: CPTII,S$GLB,, | Performed by: NEUROLOGICAL SURGERY

## 2023-11-28 PROCEDURE — 3008F PR BODY MASS INDEX (BMI) DOCUMENTED: ICD-10-PCS | Mod: CPTII,S$GLB,, | Performed by: NEUROLOGICAL SURGERY

## 2023-11-28 PROCEDURE — 70553 MRI BRAIN STEM W/O & W/DYE: CPT | Mod: 26,,, | Performed by: STUDENT IN AN ORGANIZED HEALTH CARE EDUCATION/TRAINING PROGRAM

## 2023-11-28 PROCEDURE — 70553 MRI BRAIN STEM W/O & W/DYE: CPT | Mod: TC

## 2023-11-28 PROCEDURE — 99214 PR OFFICE/OUTPT VISIT, EST, LEVL IV, 30-39 MIN: ICD-10-PCS | Mod: S$GLB,,, | Performed by: NEUROLOGICAL SURGERY

## 2023-11-28 PROCEDURE — A9585 GADOBUTROL INJECTION: HCPCS | Performed by: NEUROLOGICAL SURGERY

## 2023-11-28 RX ORDER — GADOBUTROL 604.72 MG/ML
9 INJECTION INTRAVENOUS
Status: COMPLETED | OUTPATIENT
Start: 2023-11-28 | End: 2023-11-28

## 2023-11-28 RX ADMIN — GADOBUTROL 9 ML: 604.72 INJECTION INTRAVENOUS at 11:11

## 2023-11-28 NOTE — PROGRESS NOTES
Subjective:   I, Sabina Vee, attest that this documentation has been prepared under the direction and in the presence of John Muñoz MD.     Patient ID: Gil Cueva is a 58 y.o. female     Chief Complaint: No chief complaint on file.      HPI  Ms. Gil Cueva is a 58 y.o. 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 Pt presents today for 6 month follow up with MRI brain. At the time of our last clinic visit on 5/2/2023, the pt reports she is doing well in the interim. She has no complaints or concerns at this time. She is anxious for her visit today. I reviewed the images with the pt that reveals no evidence of metastatic disease. There is some susceptibility however I believe this to be capillary telangiectasia.    Today the pt reports she is doing well overall. Pt states she has come off xanax after being on the medication for several years. Since the cessation, she has noted a decrease in panic attacks and improved mental health.    Review of Systems   Constitutional:  Negative for activity change, appetite change, fatigue, fever and unexpected weight change.   HENT:  Negative for facial swelling.    Eyes: Negative.    Respiratory: Negative.     Cardiovascular: Negative.    Gastrointestinal:  Negative for diarrhea, nausea and vomiting.   Endocrine: Negative.    Genitourinary: Negative.    Musculoskeletal:  Negative for back pain, joint swelling, myalgias and neck pain.   Neurological:  Negative for dizziness, seizures, weakness, numbness and headaches.   Psychiatric/Behavioral: Negative.          Past Medical History:   Diagnosis Date    Breast cancer     General anesthetics causing adverse effect in therapeutic use     woke up during ankle surgery    PONV (postoperative nausea and vomiting)        Objective:      Vitals:    11/28/23 1219   BP: (!) 149/100   Pulse: 96      Physical Exam  Constitutional:       General: She is not in acute distress.      Appearance: Normal appearance.   HENT:      Head: Normocephalic and atraumatic.   Pulmonary:      Effort: Pulmonary effort is normal.   Musculoskeletal:      Cervical back: Neck supple.   Neurological:      Mental Status: She is alert and oriented to person, place, and time.      GCS: GCS eye subscore is 4. GCS verbal subscore is 5. GCS motor subscore is 6.      Cranial Nerves: No cranial nerve deficit.         IMAGING:  MRI Brain W WO Contrast (5/2/2023):  Stable subcentimeter somewhat brush like enhancing lesions within the right james 1 ventrally and 1 dorsally with corresponding susceptibility overall configuration most compatible with capillary telangiectasias.       I have personally reviewed the images with the pt.      I, Dr. John Muñoz, personally performed the services described in this documentation. All medical record entries made by the scribe, Sabina Vee, were at my direction and in my presence.  I have reviewed the chart and agree that the record reflects my personal performance and is accurate and complete. John Muñoz MD. 11/28/2023    Assessment:       Capillary telangiectasias.     Plan:   I have personally reviewed the MRI brain with the pt which shows stable subcentimeter somewhat brush like enhancing lesions within the right james 1 ventrally and 1 dorsally with corresponding susceptibility overall configuration most compatible with capillary telangiectasias.     I will schedule the patient for a repeat MRI brain in 1 year with Andressa Milenr PA-C.

## 2023-11-28 NOTE — PATIENT INSTRUCTIONS
I have personally reviewed the MRI brain with the pt which shows stable subcentimeter somewhat brush like enhancing lesions within the right james 1 ventrally and 1 dorsally with corresponding susceptibility overall configuration most compatible with capillary telangiectasias.     I will schedule the patient for a repeat MRI brain in 1 year with Andressa Milner PA-C.

## 2023-12-04 ENCOUNTER — OFFICE VISIT (OUTPATIENT)
Dept: ORTHOPEDICS | Facility: CLINIC | Age: 58
End: 2023-12-04
Payer: MEDICARE

## 2023-12-04 VITALS — WEIGHT: 190 LBS | HEIGHT: 67 IN | RESPIRATION RATE: 18 BRPM | BODY MASS INDEX: 29.82 KG/M2

## 2023-12-04 DIAGNOSIS — M89.9 OSTEOCHONDRAL LESION: Primary | ICD-10-CM

## 2023-12-04 DIAGNOSIS — M94.9 OSTEOCHONDRAL LESION: Primary | ICD-10-CM

## 2023-12-04 PROCEDURE — 1160F PR REVIEW ALL MEDS BY PRESCRIBER/CLIN PHARMACIST DOCUMENTED: ICD-10-PCS | Mod: CPTII,S$GLB,, | Performed by: ORTHOPAEDIC SURGERY

## 2023-12-04 PROCEDURE — 1160F RVW MEDS BY RX/DR IN RCRD: CPT | Mod: CPTII,S$GLB,, | Performed by: ORTHOPAEDIC SURGERY

## 2023-12-04 PROCEDURE — 99024 PR POST-OP FOLLOW-UP VISIT: ICD-10-PCS | Mod: S$GLB,,, | Performed by: ORTHOPAEDIC SURGERY

## 2023-12-04 PROCEDURE — 3008F BODY MASS INDEX DOCD: CPT | Mod: CPTII,S$GLB,, | Performed by: ORTHOPAEDIC SURGERY

## 2023-12-04 PROCEDURE — 3008F PR BODY MASS INDEX (BMI) DOCUMENTED: ICD-10-PCS | Mod: CPTII,S$GLB,, | Performed by: ORTHOPAEDIC SURGERY

## 2023-12-04 PROCEDURE — 99999 PR PBB SHADOW E&M-EST. PATIENT-LVL III: ICD-10-PCS | Mod: PBBFAC,,, | Performed by: ORTHOPAEDIC SURGERY

## 2023-12-04 PROCEDURE — 99999 PR PBB SHADOW E&M-EST. PATIENT-LVL III: CPT | Mod: PBBFAC,,, | Performed by: ORTHOPAEDIC SURGERY

## 2023-12-04 PROCEDURE — 99024 POSTOP FOLLOW-UP VISIT: CPT | Mod: S$GLB,,, | Performed by: ORTHOPAEDIC SURGERY

## 2023-12-04 PROCEDURE — 1159F MED LIST DOCD IN RCRD: CPT | Mod: CPTII,S$GLB,, | Performed by: ORTHOPAEDIC SURGERY

## 2023-12-04 PROCEDURE — 1159F PR MEDICATION LIST DOCUMENTED IN MEDICAL RECORD: ICD-10-PCS | Mod: CPTII,S$GLB,, | Performed by: ORTHOPAEDIC SURGERY

## 2023-12-04 NOTE — PROGRESS NOTES
Past Medical History:   Diagnosis Date    Breast cancer     General anesthetics causing adverse effect in therapeutic use     woke up during ankle surgery    PONV (postoperative nausea and vomiting)        Past Surgical History:   Procedure Laterality Date    ANKLE SURGERY Right     2016    BREAST LUMPECTOMY Left 2008    CERVICAL FUSION N/A 20003    c-3, 4, 5    KNEE ARTHROSCOPY W/ MENISCECTOMY Right 10/24/2023    Procedure: ARTHROSCOPY, KNEE, WITH MENISCECTOMY;  Surgeon: Nicanor Luna MD;  Location: [facility];  Service: Orthopedics;  Laterality: Right;  lateral meniscectomy    ROTATOR CUFF REPAIR Right 2016    Dr. Perez       Current Outpatient Medications   Medication Sig    aspirin (ECOTRIN) 81 MG EC tablet Take 81 mg by mouth 2 (two) times a day.    aspirin-acetaminophen-caffeine 250-250-65 mg (EXCEDRIN MIGRAINE) 250-250-65 mg per tablet Take 1 tablet by mouth every 6 (six) hours as needed for Pain.    ibuprofen (ADVIL,MOTRIN) 200 MG tablet Take 200 mg by mouth every 6 (six) hours as needed for Pain.    meloxicam (MOBIC) 15 MG tablet Take 15 mg by mouth once daily.     Current Facility-Administered Medications   Medication    cyanocobalamin injection 1,000 mcg     Facility-Administered Medications Ordered in Other Visits   Medication    electrolyte-S (ISOLYTE)    electrolyte-S (ISOLYTE)    LIDOcaine (PF) 10 mg/ml (1%) injection 10 mg    ondansetron injection 4 mg       Review of patient's allergies indicates:   Allergen Reactions    Penicillins      I have no idea, my mother told me that  My mom said they gave it to me as a baby and it almost killed me    Sulfa (sulfonamide antibiotics)      I have no idea, my mother told me that    My mom said she is allergic to it so I am allergic to it       Family History   Problem Relation Age of Onset    Breast cancer Maternal Aunt     Melanoma Maternal Grandfather        Social History     Socioeconomic History    Marital status:     Number of children:  1   Tobacco Use    Smoking status: Never    Smokeless tobacco: Never   Substance and Sexual Activity    Alcohol use: Yes    Drug use: No    Sexual activity: Not Currently     Social Determinants of Health     Financial Resource Strain: High Risk (11/28/2023)    Overall Financial Resource Strain (CARDIA)     Difficulty of Paying Living Expenses: Hard   Food Insecurity: No Food Insecurity (11/28/2023)    Hunger Vital Sign     Worried About Running Out of Food in the Last Year: Never true     Ran Out of Food in the Last Year: Never true   Transportation Needs: No Transportation Needs (11/28/2023)    PRAPARE - Transportation     Lack of Transportation (Medical): No     Lack of Transportation (Non-Medical): No   Physical Activity: Sufficiently Active (11/28/2023)    Exercise Vital Sign     Days of Exercise per Week: 4 days     Minutes of Exercise per Session: 120 min   Stress: Stress Concern Present (11/28/2023)    [employer] [place] [employer] - Occupational Stress Questionnaire     Feeling of Stress : Very much   Social Connections: Moderately Integrated (11/28/2023)    Social Connection and Isolation Panel [NHANES]     Frequency of Communication with Friends and Family: More than three times a week     Frequency of Social Gatherings with Friends and Family: More than three times a week     Attends Sikh Services: Never     Active Member of Clubs or Organizations: Yes     Attends Club or Organization Meetings: 1 to 4 times per year     Marital Status:    Housing Stability: Low Risk  (11/28/2023)    Housing Stability Vital Sign     Unable to Pay for Housing in the Last Year: No     Number of Places Lived in the Last Year: 1     Unstable Housing in the Last Year: No       Chief Complaint:   Chief Complaint   Patient presents with    Right Knee - Post-op Evaluation       Date of surgery:  October 24, 2023    History of present illness:  58-year-old female underwent right knee arthroscopy for a  significant lateral femoral condyle lesion.  Patient had underwent a chondroplasty.  She is doing better.  Has the sharp pain that is gone now.  Starting to go the gym.  Knee catches occasionally but it is much better than prior to surgery.  She is able to walk long distances.      Review of Systems:    Musculoskeletal:  See HPI        Physical Examination:    Vital Signs:    Vitals:    12/04/23 1323   Resp: 18       Body mass index is 29.76 kg/m².    This a well-developed, well nourished patient in no acute distress.  They are alert and oriented and cooperative to examination.  Pt. walks without an antalgic gait.      Examination of the right knee shows healed surgical incisions.  No erythema drainage.  Patient has full range of motion.  No joint effusion.    X-rays:  None     Assessment::  Status post right lateral femoral condyle chondroplasty    Plan:  I reviewed the findings with her and her  today.  Continue with the rehab.  Follow up in 6 weeks.    This note was created using M Modal voice recognition software that occasionally misinterpreted phrases or words.

## 2024-01-11 DIAGNOSIS — Z00.00 ENCOUNTER FOR MEDICARE ANNUAL WELLNESS EXAM: ICD-10-CM

## 2024-01-18 ENCOUNTER — OFFICE VISIT (OUTPATIENT)
Dept: ORTHOPEDICS | Facility: CLINIC | Age: 59
End: 2024-01-18
Payer: MEDICARE

## 2024-01-18 VITALS — HEIGHT: 67 IN | BODY MASS INDEX: 29.82 KG/M2 | WEIGHT: 190 LBS

## 2024-01-18 DIAGNOSIS — M94.9 OSTEOCHONDRAL LESION: Primary | ICD-10-CM

## 2024-01-18 DIAGNOSIS — M89.9 OSTEOCHONDRAL LESION: Primary | ICD-10-CM

## 2024-01-18 PROCEDURE — 3008F BODY MASS INDEX DOCD: CPT | Mod: CPTII,S$GLB,, | Performed by: ORTHOPAEDIC SURGERY

## 2024-01-18 PROCEDURE — 1160F RVW MEDS BY RX/DR IN RCRD: CPT | Mod: CPTII,S$GLB,, | Performed by: ORTHOPAEDIC SURGERY

## 2024-01-18 PROCEDURE — 1159F MED LIST DOCD IN RCRD: CPT | Mod: CPTII,S$GLB,, | Performed by: ORTHOPAEDIC SURGERY

## 2024-01-18 PROCEDURE — 99999 PR PBB SHADOW E&M-EST. PATIENT-LVL II: CPT | Mod: PBBFAC,,, | Performed by: ORTHOPAEDIC SURGERY

## 2024-01-18 PROCEDURE — 99024 POSTOP FOLLOW-UP VISIT: CPT | Mod: S$GLB,,, | Performed by: ORTHOPAEDIC SURGERY

## 2024-01-18 NOTE — PROGRESS NOTES
Past Medical History:   Diagnosis Date    Breast cancer     General anesthetics causing adverse effect in therapeutic use     woke up during ankle surgery    PONV (postoperative nausea and vomiting)        Past Surgical History:   Procedure Laterality Date    ANKLE SURGERY Right     2016    BREAST LUMPECTOMY Left 2008    CERVICAL FUSION N/A 20003    c-3, 4, 5    KNEE ARTHROSCOPY W/ MENISCECTOMY Right 10/24/2023    Procedure: ARTHROSCOPY, KNEE, WITH MENISCECTOMY;  Surgeon: Nicanor Luna MD;  Location: [facility];  Service: Orthopedics;  Laterality: Right;  lateral meniscectomy    ROTATOR CUFF REPAIR Right 2016    Dr. Perez       Current Outpatient Medications   Medication Sig    aspirin-acetaminophen-caffeine 250-250-65 mg (EXCEDRIN MIGRAINE) 250-250-65 mg per tablet Take 1 tablet by mouth every 6 (six) hours as needed for Pain.    ibuprofen (ADVIL,MOTRIN) 200 MG tablet Take 200 mg by mouth every 6 (six) hours as needed for Pain.    meloxicam (MOBIC) 15 MG tablet Take 15 mg by mouth once daily.     Current Facility-Administered Medications   Medication    cyanocobalamin injection 1,000 mcg     Facility-Administered Medications Ordered in Other Visits   Medication    electrolyte-S (ISOLYTE)    electrolyte-S (ISOLYTE)    LIDOcaine (PF) 10 mg/ml (1%) injection 10 mg    ondansetron injection 4 mg       Review of patient's allergies indicates:   Allergen Reactions    Penicillins      I have no idea, my mother told me that  My mom said they gave it to me as a baby and it almost killed me    Sulfa (sulfonamide antibiotics)      I have no idea, my mother told me that    My mom said she is allergic to it so I am allergic to it       Family History   Problem Relation Age of Onset    Breast cancer Maternal Aunt     Melanoma Maternal Grandfather        Social History     Socioeconomic History    Marital status:     Number of children: 1   Tobacco Use    Smoking status: Never    Smokeless tobacco: Never    Substance and Sexual Activity    Alcohol use: Yes    Drug use: No    Sexual activity: Not Currently     Social Determinants of Health     Financial Resource Strain: High Risk (11/28/2023)    Overall Financial Resource Strain (CARDIA)     Difficulty of Paying Living Expenses: Hard   Food Insecurity: No Food Insecurity (11/28/2023)    Hunger Vital Sign     Worried About Running Out of Food in the Last Year: Never true     Ran Out of Food in the Last Year: Never true   Transportation Needs: No Transportation Needs (11/28/2023)    PRAPARE - Transportation     Lack of Transportation (Medical): No     Lack of Transportation (Non-Medical): No   Physical Activity: Sufficiently Active (11/28/2023)    Exercise Vital Sign     Days of Exercise per Week: 4 days     Minutes of Exercise per Session: 120 min   Stress: Stress Concern Present (11/28/2023)    [employer] [place] [employer] - Occupational Stress Questionnaire     Feeling of Stress : Very much   Social Connections: Moderately Integrated (11/28/2023)    Social Connection and Isolation Panel [NHANES]     Frequency of Communication with Friends and Family: More than three times a week     Frequency of Social Gatherings with Friends and Family: More than three times a week     Attends Tenriism Services: Never     Active Member of Clubs or Organizations: Yes     Attends Club or Organization Meetings: 1 to 4 times per year     Marital Status:    Housing Stability: Low Risk  (11/28/2023)    Housing Stability Vital Sign     Unable to Pay for Housing in the Last Year: No     Number of Places Lived in the Last Year: 1     Unstable Housing in the Last Year: No       Chief Complaint:   Chief Complaint   Patient presents with    Right Knee - Pain       Date of surgery:  October 24, 2023    History of present illness:  58-year-old female underwent right knee arthroscopy for a significant lateral femoral condyle lesion.  Patient underwent a chondroplasty.  She is  doing better.  Had the sharp pain that is gone now.  Starting to go the gym.  Knee catches occasionally but it is much better than prior to surgery.  She is able to walk long distances.      Review of Systems:    Musculoskeletal:  See HPI        Physical Examination:    Vital Signs:    There were no vitals filed for this visit.      Body mass index is 29.76 kg/m².    This a well-developed, well nourished patient in no acute distress.  They are alert and oriented and cooperative to examination.  Pt. walks without an antalgic gait.      Examination of the right knee shows healed surgical incisions.  No erythema drainage.  Patient has full range of motion.  No joint effusion.    X-rays:  None     Assessment::  Status post right lateral femoral condyle chondroplasty    Plan:  I reviewed the findings with her today.  Continue with the rehab.  Talked about possibly trying some hyaluronic acid or PRP to help with the chondral injury.  Follow up As needed.  We will put in a referral for some back issues that are rising.    This note was created using M Modal voice recognition software that occasionally misinterpreted phrases or words.

## 2024-01-19 DIAGNOSIS — M54.50 LOW BACK PAIN, UNSPECIFIED BACK PAIN LATERALITY, UNSPECIFIED CHRONICITY, UNSPECIFIED WHETHER SCIATICA PRESENT: Primary | ICD-10-CM

## 2024-02-04 DIAGNOSIS — K29.70 GASTRITIS, PRESENCE OF BLEEDING UNSPECIFIED, UNSPECIFIED CHRONICITY, UNSPECIFIED GASTRITIS TYPE: ICD-10-CM

## 2024-02-04 RX ORDER — OMEPRAZOLE 40 MG/1
40 CAPSULE, DELAYED RELEASE ORAL
Qty: 30 CAPSULE | Refills: 11 | OUTPATIENT
Start: 2024-02-04

## 2024-02-04 NOTE — TELEPHONE ENCOUNTER
No care due was identified.  Health [facility] Embedded Care Due Messages. Reference number: [id number].   2/04/2024 3:26:54 AM CST

## 2024-02-04 NOTE — TELEPHONE ENCOUNTER
Refill Decision Note   Gil Cueva  is requesting a refill authorization.  Brief Assessment and Rationale for Refill:  Quick Discontinue     Medication Therapy Plan:  discontinued on 7/5/2023 by Opal Oneil NP.      Comments:     Note composed:11:13 AM 02/04/2024             Appointments     Last Visit   8/1/2023 Erika Morfin MD   Next Visit   2/7/2024 Erika Morfin MD

## 2024-02-07 ENCOUNTER — PATIENT MESSAGE (OUTPATIENT)
Dept: ORTHOPEDICS | Facility: CLINIC | Age: 59
End: 2024-02-07
Payer: MEDICARE

## 2024-02-07 ENCOUNTER — PATIENT MESSAGE (OUTPATIENT)
Dept: BEHAVIORAL HEALTH | Facility: CLINIC | Age: 59
End: 2024-02-07
Payer: MEDICARE

## 2024-02-07 ENCOUNTER — OFFICE VISIT (OUTPATIENT)
Dept: FAMILY MEDICINE | Facility: CLINIC | Age: 59
End: 2024-02-07
Payer: MEDICARE

## 2024-02-07 VITALS
BODY MASS INDEX: 29.52 KG/M2 | HEIGHT: 67 IN | OXYGEN SATURATION: 98 % | DIASTOLIC BLOOD PRESSURE: 80 MMHG | SYSTOLIC BLOOD PRESSURE: 120 MMHG | HEART RATE: 85 BPM | TEMPERATURE: 98 F | WEIGHT: 188.06 LBS

## 2024-02-07 DIAGNOSIS — Z85.3 HISTORY OF BREAST CANCER: ICD-10-CM

## 2024-02-07 DIAGNOSIS — R79.9 ABNORMAL FINDING OF BLOOD CHEMISTRY, UNSPECIFIED: ICD-10-CM

## 2024-02-07 DIAGNOSIS — F41.1 GAD (GENERALIZED ANXIETY DISORDER): ICD-10-CM

## 2024-02-07 DIAGNOSIS — G93.9 BRAIN LESION: ICD-10-CM

## 2024-02-07 DIAGNOSIS — E78.5 HYPERLIPIDEMIA, UNSPECIFIED HYPERLIPIDEMIA TYPE: Primary | ICD-10-CM

## 2024-02-07 DIAGNOSIS — G89.29 CHRONIC RIGHT-SIDED LOW BACK PAIN WITH RIGHT-SIDED SCIATICA: ICD-10-CM

## 2024-02-07 DIAGNOSIS — Z12.11 COLON CANCER SCREENING: ICD-10-CM

## 2024-02-07 DIAGNOSIS — M89.9 OSTEOCHONDRAL LESION: Primary | ICD-10-CM

## 2024-02-07 DIAGNOSIS — M54.41 CHRONIC RIGHT-SIDED LOW BACK PAIN WITH RIGHT-SIDED SCIATICA: ICD-10-CM

## 2024-02-07 DIAGNOSIS — B35.1 ONYCHOMYCOSIS OF LEFT GREAT TOE: ICD-10-CM

## 2024-02-07 DIAGNOSIS — E66.9 OBESITY (BMI 30.0-34.9): ICD-10-CM

## 2024-02-07 DIAGNOSIS — M94.9 OSTEOCHONDRAL LESION: Primary | ICD-10-CM

## 2024-02-07 DIAGNOSIS — Z13.1 DIABETES MELLITUS SCREENING: ICD-10-CM

## 2024-02-07 PROCEDURE — 1159F MED LIST DOCD IN RCRD: CPT | Mod: CPTII,S$GLB,, | Performed by: FAMILY MEDICINE

## 2024-02-07 PROCEDURE — 3008F BODY MASS INDEX DOCD: CPT | Mod: CPTII,S$GLB,, | Performed by: FAMILY MEDICINE

## 2024-02-07 PROCEDURE — 99214 OFFICE O/P EST MOD 30 MIN: CPT | Mod: S$GLB,,, | Performed by: FAMILY MEDICINE

## 2024-02-07 PROCEDURE — 3074F SYST BP LT 130 MM HG: CPT | Mod: CPTII,S$GLB,, | Performed by: FAMILY MEDICINE

## 2024-02-07 PROCEDURE — 3079F DIAST BP 80-89 MM HG: CPT | Mod: CPTII,S$GLB,, | Performed by: FAMILY MEDICINE

## 2024-02-07 PROCEDURE — 99999 PR PBB SHADOW E&M-EST. PATIENT-LVL IV: CPT | Mod: PBBFAC,,, | Performed by: FAMILY MEDICINE

## 2024-02-07 PROCEDURE — 1160F RVW MEDS BY RX/DR IN RCRD: CPT | Mod: CPTII,S$GLB,, | Performed by: FAMILY MEDICINE

## 2024-02-07 RX ORDER — TERBINAFINE HYDROCHLORIDE 250 MG/1
250 TABLET ORAL DAILY
Qty: 30 TABLET | Refills: 3 | Status: SHIPPED | OUTPATIENT
Start: 2024-02-07 | End: 2024-03-08

## 2024-02-07 RX ORDER — TIZANIDINE 4 MG/1
4 TABLET ORAL EVERY 6 HOURS PRN
Qty: 30 TABLET | Status: SHIPPED | OUTPATIENT
Start: 2024-02-07 | End: 2024-02-17

## 2024-02-07 NOTE — PROGRESS NOTES
"Subjective:       Patient ID: Gil Cueva is a 59 y.o. female.    Chief Complaint: Follow-up (6mth f/u)    HPI  Review of Systems   Constitutional:  Negative for fatigue and unexpected weight change.   Respiratory:  Negative for chest tightness and shortness of breath.    Cardiovascular:  Negative for chest pain, palpitations and leg swelling.   Gastrointestinal:  Negative for abdominal pain.   Musculoskeletal:  Positive for back pain and myalgias. Negative for arthralgias.   Neurological:  Negative for dizziness, syncope, light-headedness and headaches.       Patient Active Problem List   Diagnosis    History of breast cancer    Arthritis    Osteoarthritis    Malignant neoplasm of central portion of left breast in female, estrogen receptor positive    DDD (degenerative disc disease), cervical s/p fusion 2003    Syncope    Arthralgia    Insomnia    ADIA (generalized anxiety disorder)    Obesity (BMI 30.0-34.9)    Hyperlipidemia    B12 deficiency    Vertigo    Aortic atherosclerosis    Brain lesion     Patient is here for a chronic conditions follow up.    Reviewed labs 12/2022  Ortho Dr. Luna right knee osteochondral lesion/meniscal tear s/p surgery 10/23. Now having low back pain radiates to right hip and spasms. Back exercises done at gym and not helping. Taking meloxicam      Mood-Self weaned xanax.  Didn't want to be on addicting drugs and didn't feel like it was working anymore. Reporting that "she doesn't give a shit about anything anymore".  Hesitant to take anti depressants.  Lots of worry and stress about adult son who has high functioning autism.  Willing to talk to counselor    Josiane Muñoz brain lesion. MRI (tumor with perfusion) and spectroscopy. This is a patient who has had recent vertigo that comes on with anxiety.  She has a history of breast cancer more than 12 years ago but has been disease-free since. An MRI was ordered revealing 2 very small areas of enhancement in " "the james that were concerning for possible metastases. At the time of our last clinic visit on 4/11/2023, the pt is here to discuss her PET scan results, which show no evidence of hypermetabolic tumor. ? Congenital lesion. 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  Punctate separate focus susceptibility in the right mid james, which may represent sequela of remote microhemorrhage.  No surrounding edema or enhancement.     Neuro Dr. Rubio Has h/o migraines. Has been having more migraines last 1 month.  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 Relieved with IB        Chronic insomnia and ADIA controlled with xanax .5 mg-takes 2 at bedtime and non during day. Has been filling monthly and has a surplus     Cervical ddd s/p fusion 2003     Onc Dr. Carter h/o left breast ca 2008 lumpectomy. Mammo neg 3/22 . PET scan 4/23 In this patient with history of left breast cancer post lumpectomy and brain metastasis, there is no evidence of hypermetabolic tumor.      Card Dr. Brown-has seen for palpitations -ended up related to anxiety.  High stress at  Home. 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 Under care of Dr. William.  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  Extremely resistant to taking anti-depressant meds. Intolerant to several which made her worse            GYN PAP utd- 5/2019 ago Dr. Alvarado     Declines vaccines     Eye Dr. MYNOR Cifuentes-treats occ red eye and eyelid irritation with gooey d/c with rare limited use of prednisolone eye drops. Use 3-4 times per year for 2-5 days. Usually left eye. Needs refill     GI colonoscopy 2015 + polyps. Recommended 5 year surveillance  Objective:      Physical Exam  Vitals and nursing " "note reviewed.   Constitutional:       Appearance: She is well-developed.   Cardiovascular:      Rate and Rhythm: Normal rate and regular rhythm.      Heart sounds: Normal heart sounds.   Pulmonary:      Effort: Pulmonary effort is normal.      Breath sounds: Normal breath sounds.   Skin:     General: Skin is warm and dry.   Neurological:      Mental Status: She is alert and oriented to person, place, and time.         Assessment:       1. Hyperlipidemia, unspecified hyperlipidemia type    2. History of breast cancer    3. Brain lesion    4. Obesity (BMI 30.0-34.9)    5. ADIA (generalized anxiety disorder)    6. Diabetes mellitus screening    7. Colon cancer screening    8. Onychomycosis of left great toe    9. Chronic right-sided low back pain with right-sided sciatica    10. Abnormal finding of blood chemistry, unspecified        Plan:         1. Hyperlipidemia, unspecified hyperlipidemia type  I recommend a heart healthy diet rich in fiber, fresh vegetables and fruit and low in saturated fats (fried foods, red meat, etc.).  I also recommend regular exercise including a minimum of 150 minutes of cardio exercise per week and 2-30 minute workouts of strength training like light weights, yoga, pilates, etc.  You should work toward a body mass index of < 25.      - CBC Auto Differential; Future  - Comprehensive Metabolic Panel; Future  - Lipid Panel; Future    2. History of breast cancer  Cont onc monitoring and care    3. Brain lesion  Appears benign. Cont serial imaging per NS    4. Obesity (BMI 30.0-34.9)  Counseled patient on his ideal body weight, health consequences of being obese and current recommendations including weekly exercise and a heart healthy diet.  Current BMI is:Estimated body mass index is 29.45 kg/m² as calculated from the following:    Height as of this encounter: 5' 7" (1.702 m).    Weight as of this encounter: 85.3 kg (188 lb 0.8 oz)..  Patient is aware that ideal BMI < 25 or Weight in (lb) to " have BMI = 25: 159.3.      5. ADIA (generalized anxiety disorder)  Refer for eval and treat  - Ambulatory referral/consult to Primary Care Behavioral Health (Non-Opioids); Future    6. Diabetes mellitus screening  Screen and treat as indicated:    - Hemoglobin A1C; Future    7. Colon cancer screening  Patient declines a colonoscopy after discussing benefits and risks. Patient is willing to do FOBT x3  or FIT or Cologuard test at home understanding it less effective at detecting cancers/masses/polyps than a screening colonoscopy.    - Cologuard Screening (Multitarget Stool DNA); Future  - Cologuard Screening (Multitarget Stool DNA)    8. Onychomycosis of left great toe  Discussed options for treatment including oral anti-fungals and risks including liver damage, duration of therapy (typically 4-6 months) and the need for periodic monitoring of liver enzymes.  Alternative treatments were discussed including laser and topical therapies and risks,benefits,cost, and effectiveness of each type.  Patient expressed verbal understanding and elects to proceed with oral anti-fungal therapy. Will plan to draw liver enzymes today and q 2 months until therapy completed. I counseled patient on proper foot and toenail hygiene to prevent fungal infections.    - terbinafine HCL (LAMISIL) 250 mg tablet; Take 1 tablet (250 mg total) by mouth once daily.  Dispense: 30 tablet; Refill: 3    9. Chronic right-sided low back pain with right-sided sciatica  Refer for eval and treat  - Ambulatory referral/consult to [facility]; Future  - tiZANidine (ZANAFLEX) 4 MG tablet; Take 1 tablet (4 mg total) by mouth every 6 (six) hours as needed (spasms).  Dispense: 30 tablet; Refill: PRN    10. Abnormal finding of blood chemistry, unspecified  Screen and treat as indicated:    - Hemoglobin A1C; Future      Time spent with patient: 20 minutes    Patient with be reevaluated in 6 months or sooner prn    Greater than 50% of this visit was spent  counseling as described in above documentation:Yes

## 2024-02-20 ENCOUNTER — TELEPHONE (OUTPATIENT)
Dept: BEHAVIORAL HEALTH | Facility: CLINIC | Age: 59
End: 2024-02-20
Payer: MEDICARE

## 2024-02-21 ENCOUNTER — PATIENT MESSAGE (OUTPATIENT)
Dept: BEHAVIORAL HEALTH | Facility: CLINIC | Age: 59
End: 2024-02-21
Payer: MEDICARE

## 2024-02-26 ENCOUNTER — OFFICE VISIT (OUTPATIENT)
Dept: ORTHOPEDICS | Facility: CLINIC | Age: 59
End: 2024-02-26
Payer: MEDICARE

## 2024-02-26 VITALS — BODY MASS INDEX: 29.51 KG/M2 | HEIGHT: 67 IN | WEIGHT: 188 LBS

## 2024-02-26 DIAGNOSIS — M17.10 ARTHRITIS OF KNEE: Primary | ICD-10-CM

## 2024-02-26 PROCEDURE — 99999 PR PBB SHADOW E&M-EST. PATIENT-LVL II: CPT | Mod: PBBFAC,,, | Performed by: ORTHOPAEDIC SURGERY

## 2024-02-26 PROCEDURE — 20610 DRAIN/INJ JOINT/BURSA W/O US: CPT | Mod: RT,S$GLB,, | Performed by: ORTHOPAEDIC SURGERY

## 2024-02-26 PROCEDURE — 99499 UNLISTED E&M SERVICE: CPT | Mod: S$GLB,,, | Performed by: ORTHOPAEDIC SURGERY

## 2024-02-26 NOTE — PROCEDURES
Large Joint Aspiration/Injection: R knee joint    Date/Time: 2/26/2024 11:30 AM    Performed by: Nicanor Luna MD  Authorized by: Nicanor Luna MD    Consent Done?:  Yes (Verbal)  Indications:  Pain  Site marked: the procedure site was marked    Timeout: prior to procedure the correct patient, procedure, and site was verified      Details:  Needle Size:  20 G  Approach:  Anterolateral  Location:  Knee  Site:  R knee joint  Medications:  16 mg hyaluronate 16 mg/2 mL  Patient tolerance:  Patient tolerated the procedure well with no immediate complications

## 2024-02-26 NOTE — PROGRESS NOTES
Past Medical History:   Diagnosis Date    Breast cancer     General anesthetics causing adverse effect in therapeutic use     woke up during ankle surgery    PONV (postoperative nausea and vomiting)        Past Surgical History:   Procedure Laterality Date    ANKLE SURGERY Right     2016    BREAST LUMPECTOMY Left 2008    CERVICAL FUSION N/A 20003    c-3, 4, 5    KNEE ARTHROSCOPY W/ MENISCECTOMY Right 10/24/2023    Procedure: ARTHROSCOPY, KNEE, WITH MENISCECTOMY;  Surgeon: Nicanor Luna MD;  Location: [facility];  Service: Orthopedics;  Laterality: Right;  lateral meniscectomy    ROTATOR CUFF REPAIR Right 2016    Dr. Perez       Current Outpatient Medications   Medication Sig    aspirin-acetaminophen-caffeine 250-250-65 mg (EXCEDRIN MIGRAINE) 250-250-65 mg per tablet Take 1 tablet by mouth every 6 (six) hours as needed for Pain.    ibuprofen (ADVIL,MOTRIN) 200 MG tablet Take 200 mg by mouth every 6 (six) hours as needed for Pain.    meloxicam (MOBIC) 15 MG tablet Take 15 mg by mouth once daily.    terbinafine HCL (LAMISIL) 250 mg tablet Take 1 tablet (250 mg total) by mouth once daily.     Current Facility-Administered Medications   Medication    cyanocobalamin injection 1,000 mcg     Facility-Administered Medications Ordered in Other Visits   Medication    electrolyte-S (ISOLYTE)    electrolyte-S (ISOLYTE)    LIDOcaine (PF) 10 mg/ml (1%) injection 10 mg    ondansetron injection 4 mg       Review of patient's allergies indicates:   Allergen Reactions    Penicillins      I have no idea, my mother told me that  My mom said they gave it to me as a baby and it almost killed me    Sulfa (sulfonamide antibiotics)      I have no idea, my mother told me that    My mom said she is allergic to it so I am allergic to it       Family History   Problem Relation Age of Onset    Breast cancer Maternal Aunt     Melanoma Maternal Grandfather        Social History     Socioeconomic History    Marital status:      Number of children: 1   Tobacco Use    Smoking status: Never    Smokeless tobacco: Never   Substance and Sexual Activity    Alcohol use: Yes    Drug use: No    Sexual activity: Not Currently     Social Determinants of Health     Financial Resource Strain: High Risk (1/31/2024)    Overall Financial Resource Strain (CARDIA)     Difficulty of Paying Living Expenses: Very hard   Food Insecurity: Food Insecurity Present (1/31/2024)    Hunger Vital Sign     Worried About Running Out of Food in the Last Year: Sometimes true     Ran Out of Food in the Last Year: Sometimes true   Transportation Needs: No Transportation Needs (1/31/2024)    PRAPARE - Transportation     Lack of Transportation (Medical): No     Lack of Transportation (Non-Medical): No   Physical Activity: Sufficiently Active (1/31/2024)    Exercise Vital Sign     Days of Exercise per Week: 4 days     Minutes of Exercise per Session: 80 min   Stress: Stress Concern Present (1/31/2024)    [employer] [employer] - Occupational Stress Questionnaire     Feeling of Stress : Very much   Social Connections: Moderately Isolated (1/31/2024)    Social Connection and Isolation Panel [NHANES]     Frequency of Communication with Friends and Family: Twice a week     Frequency of Social Gatherings with Friends and Family: Never     Attends Islam Services: Never     Active Member of Clubs or Organizations: Yes     Attends Club or Organization Meetings: 1 to 4 times per year     Marital Status:    Housing Stability: Low Risk  (1/31/2024)    Housing Stability Vital Sign     Unable to Pay for Housing in the Last Year: No     Number of Places Lived in the Last Year: 1     Unstable Housing in the Last Year: No       Chief Complaint:   No chief complaint on file.      Date of surgery:  October 24, 2023    History of present illness:  58-year-old female underwent right knee arthroscopy for a significant lateral femoral condyle lesion.  Patient underwent a  chondroplasty.  She is doing better.  Had the sharp pain that is gone now.  Starting to go the gym.  Knee catches occasionally but it is much better than prior to surgery.  She is able to walk long distances.      Review of Systems:    Musculoskeletal:  See HPI        Physical Examination:    Vital Signs:    There were no vitals filed for this visit.      There is no height or weight on file to calculate BMI.    This a well-developed, well nourished patient in no acute distress.  They are alert and oriented and cooperative to examination.  Pt. walks without an antalgic gait.      Examination of the right knee shows healed surgical incisions.  No erythema drainage.  Patient has full range of motion.  No joint effusion.    X-rays:  None     Assessment::  Status post right lateral femoral condyle chondroplasty  R knee osteoarthritis    Plan:  I reviewed the findings with her today.  Injected her right knee with Synvisc 1 of 3.  Follow up next week to continue    This note was created using M Modal voice recognition software that occasionally misinterpreted phrases or words.

## 2024-03-01 LAB — NONINV COLON CA DNA+OCC BLD SCRN STL QL: NEGATIVE

## 2024-03-04 ENCOUNTER — OFFICE VISIT (OUTPATIENT)
Dept: ORTHOPEDICS | Facility: CLINIC | Age: 59
End: 2024-03-04
Payer: MEDICARE

## 2024-03-04 DIAGNOSIS — M17.10 ARTHRITIS OF KNEE: Primary | ICD-10-CM

## 2024-03-04 PROCEDURE — 20610 DRAIN/INJ JOINT/BURSA W/O US: CPT | Mod: RT,S$GLB,, | Performed by: ORTHOPAEDIC SURGERY

## 2024-03-04 PROCEDURE — 99999 PR PBB SHADOW E&M-EST. PATIENT-LVL I: CPT | Mod: PBBFAC,,, | Performed by: ORTHOPAEDIC SURGERY

## 2024-03-04 PROCEDURE — 99499 UNLISTED E&M SERVICE: CPT | Mod: 25,S$GLB,, | Performed by: ORTHOPAEDIC SURGERY

## 2024-03-04 NOTE — PROCEDURES
Large Joint Aspiration/Injection: R knee joint    Date/Time: 3/4/2024 11:00 AM    Performed by: Nicanor Luna MD  Authorized by: Nicanor Luna MD    Consent Done?:  Yes (Verbal)  Indications:  Pain  Site marked: the procedure site was marked    Timeout: prior to procedure the correct patient, procedure, and site was verified      Details:  Needle Size:  20 G  Approach:  Anterolateral  Location:  Knee  Site:  R knee joint  Medications:  16 mg hyaluronate 16 mg/2 mL  Patient tolerance:  Patient tolerated the procedure well with no immediate complications

## 2024-03-04 NOTE — PROGRESS NOTES
Past Medical History:   Diagnosis Date    Breast cancer     General anesthetics causing adverse effect in therapeutic use     woke up during ankle surgery    PONV (postoperative nausea and vomiting)        Past Surgical History:   Procedure Laterality Date    ANKLE SURGERY Right     2016    BREAST LUMPECTOMY Left 2008    CERVICAL FUSION N/A 20003    c-3, 4, 5    KNEE ARTHROSCOPY W/ MENISCECTOMY Right 10/24/2023    Procedure: ARTHROSCOPY, KNEE, WITH MENISCECTOMY;  Surgeon: Nicanor Luna MD;  Location: [facility];  Service: Orthopedics;  Laterality: Right;  lateral meniscectomy    ROTATOR CUFF REPAIR Right 2016    Dr. Perez       Current Outpatient Medications   Medication Sig    aspirin-acetaminophen-caffeine 250-250-65 mg (EXCEDRIN MIGRAINE) 250-250-65 mg per tablet Take 1 tablet by mouth every 6 (six) hours as needed for Pain.    ibuprofen (ADVIL,MOTRIN) 200 MG tablet Take 200 mg by mouth every 6 (six) hours as needed for Pain.    meloxicam (MOBIC) 15 MG tablet Take 15 mg by mouth once daily.    terbinafine HCL (LAMISIL) 250 mg tablet Take 1 tablet (250 mg total) by mouth once daily.     Current Facility-Administered Medications   Medication    cyanocobalamin injection 1,000 mcg     Facility-Administered Medications Ordered in Other Visits   Medication    electrolyte-S (ISOLYTE)    electrolyte-S (ISOLYTE)    LIDOcaine (PF) 10 mg/ml (1%) injection 10 mg    ondansetron injection 4 mg       Review of patient's allergies indicates:   Allergen Reactions    Penicillins      I have no idea, my mother told me that  My mom said they gave it to me as a baby and it almost killed me    Sulfa (sulfonamide antibiotics)      I have no idea, my mother told me that    My mom said she is allergic to it so I am allergic to it       Family History   Problem Relation Age of Onset    Breast cancer Maternal Aunt     Melanoma Maternal Grandfather        Social History     Socioeconomic History    Marital status:      Number of children: 1   Tobacco Use    Smoking status: Never    Smokeless tobacco: Never   Substance and Sexual Activity    Alcohol use: Yes    Drug use: No    Sexual activity: Not Currently     Social Determinants of Health     Financial Resource Strain: High Risk (1/31/2024)    Overall Financial Resource Strain (CARDIA)     Difficulty of Paying Living Expenses: Very hard   Food Insecurity: Food Insecurity Present (1/31/2024)    Hunger Vital Sign     Worried About Running Out of Food in the Last Year: Sometimes true     Ran Out of Food in the Last Year: Sometimes true   Transportation Needs: No Transportation Needs (1/31/2024)    PRAPARE - Transportation     Lack of Transportation (Medical): No     Lack of Transportation (Non-Medical): No   Physical Activity: Sufficiently Active (1/31/2024)    Exercise Vital Sign     Days of Exercise per Week: 4 days     Minutes of Exercise per Session: 80 min   Stress: Stress Concern Present (1/31/2024)    [employer] [place] [employer] - Occupational Stress Questionnaire     Feeling of Stress : Very much   Social Connections: Moderately Isolated (1/31/2024)    Social Connection and Isolation Panel [NHANES]     Frequency of Communication with Friends and Family: Twice a week     Frequency of Social Gatherings with Friends and Family: Never     Attends [employer] Services: Never     Active Member of Clubs or Organizations: Yes     Attends Club or Organization Meetings: 1 to 4 times per year     Marital Status:    Housing Stability: Low Risk  (1/31/2024)    Housing Stability Vital Sign     Unable to Pay for Housing in the Last Year: No     Number of Places Lived in the Last Year: 1     Unstable Housing in the Last Year: No       Chief Complaint:   No chief complaint on file.      Date of surgery:  October 24, 2023    History of present illness:  59-year-old female underwent right knee arthroscopy for a significant lateral femoral condyle lesion.  Patient underwent a  chondroplasty.  She is doing better.  Had the sharp pain that is gone now.  Starting to go the gym.  Knee catches occasionally but it is much better than prior to surgery.  She is able to walk long distances.      Review of Systems:    Musculoskeletal:  See HPI        Physical Examination:    Vital Signs:    There were no vitals filed for this visit.      There is no height or weight on file to calculate BMI.    This a well-developed, well nourished patient in no acute distress.  They are alert and oriented and cooperative to examination.  Pt. walks without an antalgic gait.      Examination of the right knee shows healed surgical incisions.  No erythema drainage.  Patient has full range of motion.  No joint effusion.    X-rays:  None     Assessment::  Status post right lateral femoral condyle chondroplasty  R knee osteoarthritis    Plan:  I reviewed the findings with her today.  Injected her right knee with Synvisc 2 of 3.  Follow up next week to continue    This note was created using M Modal voice recognition software that occasionally misinterpreted phrases or words.

## 2024-03-18 ENCOUNTER — OFFICE VISIT (OUTPATIENT)
Dept: ORTHOPEDICS | Facility: CLINIC | Age: 59
End: 2024-03-18
Payer: MEDICARE

## 2024-03-18 VITALS — HEIGHT: 67 IN | BODY MASS INDEX: 29.52 KG/M2 | WEIGHT: 188.06 LBS

## 2024-03-18 DIAGNOSIS — M17.10 ARTHRITIS OF KNEE: Primary | ICD-10-CM

## 2024-03-18 PROCEDURE — 99999 PR PBB SHADOW E&M-EST. PATIENT-LVL II: CPT | Mod: PBBFAC,,, | Performed by: ORTHOPAEDIC SURGERY

## 2024-03-18 PROCEDURE — 99499 UNLISTED E&M SERVICE: CPT | Mod: S$GLB,,, | Performed by: ORTHOPAEDIC SURGERY

## 2024-03-18 PROCEDURE — 1159F MED LIST DOCD IN RCRD: CPT | Mod: CPTII,S$GLB,, | Performed by: ORTHOPAEDIC SURGERY

## 2024-03-18 PROCEDURE — 20610 DRAIN/INJ JOINT/BURSA W/O US: CPT | Mod: RT,S$GLB,, | Performed by: ORTHOPAEDIC SURGERY

## 2024-03-18 PROCEDURE — 1160F RVW MEDS BY RX/DR IN RCRD: CPT | Mod: CPTII,S$GLB,, | Performed by: ORTHOPAEDIC SURGERY

## 2024-03-18 NOTE — PROGRESS NOTES
Past Medical History:   Diagnosis Date    Breast cancer     General anesthetics causing adverse effect in therapeutic use     woke up during ankle surgery    PONV (postoperative nausea and vomiting)        Past Surgical History:   Procedure Laterality Date    ANKLE SURGERY Right     2016    BREAST LUMPECTOMY Left 2008    CERVICAL FUSION N/A 20003    c-3, 4, 5    KNEE ARTHROSCOPY W/ MENISCECTOMY Right 10/24/2023    Procedure: ARTHROSCOPY, KNEE, WITH MENISCECTOMY;  Surgeon: Nicanor Luna MD;  Location: [facility];  Service: Orthopedics;  Laterality: Right;  lateral meniscectomy    ROTATOR CUFF REPAIR Right 2016    Dr. Perez       Current Outpatient Medications   Medication Sig    aspirin-acetaminophen-caffeine 250-250-65 mg (EXCEDRIN MIGRAINE) 250-250-65 mg per tablet Take 1 tablet by mouth every 6 (six) hours as needed for Pain.    ibuprofen (ADVIL,MOTRIN) 200 MG tablet Take 200 mg by mouth every 6 (six) hours as needed for Pain.    meloxicam (MOBIC) 15 MG tablet Take 15 mg by mouth once daily.     Current Facility-Administered Medications   Medication    cyanocobalamin injection 1,000 mcg     Facility-Administered Medications Ordered in Other Visits   Medication    electrolyte-S (ISOLYTE)    electrolyte-S (ISOLYTE)    LIDOcaine (PF) 10 mg/ml (1%) injection 10 mg    ondansetron injection 4 mg       Review of patient's allergies indicates:   Allergen Reactions    Penicillins      I have no idea, my mother told me that  My mom said they gave it to me as a baby and it almost killed me    Sulfa (sulfonamide antibiotics)      I have no idea, my mother told me that    My mom said she is allergic to it so I am allergic to it       Family History   Problem Relation Age of Onset    Breast cancer Maternal Aunt     Melanoma Maternal Grandfather        Social History     Socioeconomic History    Marital status:     Number of children: 1   Tobacco Use    Smoking status: Never    Smokeless tobacco: Never    Substance and Sexual Activity    Alcohol use: Yes    Drug use: No    Sexual activity: Not Currently     Social Determinants of Health     Financial Resource Strain: High Risk (1/31/2024)    Overall Financial Resource Strain (CARDIA)     Difficulty of Paying Living Expenses: Very hard   Food Insecurity: Food Insecurity Present (1/31/2024)    Hunger Vital Sign     Worried About Running Out of Food in the Last Year: Sometimes true     Ran Out of Food in the Last Year: Sometimes true   Transportation Needs: No Transportation Needs (1/31/2024)    PRAPARE - Transportation     Lack of Transportation (Medical): No     Lack of Transportation (Non-Medical): No   Physical Activity: Sufficiently Active (1/31/2024)    Exercise Vital Sign     Days of Exercise per Week: 4 days     Minutes of Exercise per Session: 80 min   Stress: Stress Concern Present (1/31/2024)    [employer] [place] [employer] - Occupational Stress Questionnaire     Feeling of Stress : Very much   Social Connections: Moderately Isolated (1/31/2024)    Social Connection and Isolation Panel [NHANES]     Frequency of Communication with Friends and Family: Twice a week     Frequency of Social Gatherings with Friends and Family: Never     Attends Mormonism Services: Never     Active Member of Clubs or Organizations: Yes     Attends Club or Organization Meetings: 1 to 4 times per year     Marital Status:    Housing Stability: Low Risk  (1/31/2024)    Housing Stability Vital Sign     Unable to Pay for Housing in the Last Year: No     Number of Places Lived in the Last Year: 1     Unstable Housing in the Last Year: No       Chief Complaint:   No chief complaint on file.      Date of surgery:  October 24, 2023    History of present illness:  59-year-old female underwent right knee arthroscopy for a significant lateral femoral condyle lesion.  Patient underwent a chondroplasty.  She is doing better.  Had the sharp pain that is gone now.  Starting to  go the gym.  Knee catches occasionally but it is much better than prior to surgery.  She is able to walk long distances.      Review of Systems:    Musculoskeletal:  See HPI        Physical Examination:    Vital Signs:    There were no vitals filed for this visit.      There is no height or weight on file to calculate BMI.    This a well-developed, well nourished patient in no acute distress.  They are alert and oriented and cooperative to examination.  Pt. walks without an antalgic gait.      Examination of the right knee shows healed surgical incisions.  No erythema drainage.  Patient has full range of motion.  No joint effusion.    X-rays:  None     Assessment::  Status post right lateral femoral condyle chondroplasty  R knee osteoarthritis    Plan:  I reviewed the findings with her today.  Injected her right knee with Synvisc 3 of 3.  Follow up in 6 weeks    This note was created using M Modal voice recognition software that occasionally misinterpreted phrases or words.

## 2024-03-18 NOTE — PROCEDURES
Large Joint Aspiration/Injection: R knee joint    Date/Time: 3/18/2024 11:00 AM    Performed by: Nicanor Luna MD  Authorized by: Nicanor Luna MD    Consent Done?:  Yes (Verbal)  Indications:  Pain  Site marked: the procedure site was marked    Timeout: prior to procedure the correct patient, procedure, and site was verified      Details:  Needle Size:  20 G  Approach:  Anterolateral  Location:  Knee  Site:  R knee joint  Medications:  16 mg hyaluronate 16 mg/2 mL  Patient tolerance:  Patient tolerated the procedure well with no immediate complications

## 2024-03-25 ENCOUNTER — OFFICE VISIT (OUTPATIENT)
Dept: SPINE | Facility: CLINIC | Age: 59
End: 2024-03-25
Payer: MEDICARE

## 2024-03-25 ENCOUNTER — HOSPITAL ENCOUNTER (OUTPATIENT)
Dept: RADIOLOGY | Facility: HOSPITAL | Age: 59
Discharge: HOME | End: 2024-03-25
Attending: PHYSICAL MEDICINE & REHABILITATION
Payer: MEDICARE

## 2024-03-25 VITALS — HEIGHT: 67 IN | WEIGHT: 188.06 LBS | BODY MASS INDEX: 29.52 KG/M2

## 2024-03-25 DIAGNOSIS — M54.50 CHRONIC RIGHT-SIDED LOW BACK PAIN, UNSPECIFIED WHETHER SCIATICA PRESENT: ICD-10-CM

## 2024-03-25 DIAGNOSIS — G89.29 CHRONIC RIGHT-SIDED LOW BACK PAIN, UNSPECIFIED WHETHER SCIATICA PRESENT: Primary | ICD-10-CM

## 2024-03-25 DIAGNOSIS — M54.50 CHRONIC RIGHT-SIDED LOW BACK PAIN, UNSPECIFIED WHETHER SCIATICA PRESENT: Primary | ICD-10-CM

## 2024-03-25 DIAGNOSIS — G89.29 CHRONIC RIGHT-SIDED LOW BACK PAIN, UNSPECIFIED WHETHER SCIATICA PRESENT: ICD-10-CM

## 2024-03-25 PROCEDURE — 72114 X-RAY EXAM L-S SPINE BENDING: CPT | Mod: 26,,, | Performed by: RADIOLOGY

## 2024-03-25 PROCEDURE — 99213 OFFICE O/P EST LOW 20 MIN: CPT | Mod: S$GLB,,, | Performed by: PHYSICAL MEDICINE & REHABILITATION

## 2024-03-25 PROCEDURE — 99999 PR PBB SHADOW E&M-EST. PATIENT-LVL III: CPT | Mod: PBBFAC,,, | Performed by: PHYSICAL MEDICINE & REHABILITATION

## 2024-03-25 PROCEDURE — 1160F RVW MEDS BY RX/DR IN RCRD: CPT | Mod: CPTII,S$GLB,, | Performed by: PHYSICAL MEDICINE & REHABILITATION

## 2024-03-25 PROCEDURE — 1159F MED LIST DOCD IN RCRD: CPT | Mod: CPTII,S$GLB,, | Performed by: PHYSICAL MEDICINE & REHABILITATION

## 2024-03-25 PROCEDURE — 72114 X-RAY EXAM L-S SPINE BENDING: CPT | Mod: TC

## 2024-03-25 PROCEDURE — 3008F BODY MASS INDEX DOCD: CPT | Mod: CPTII,S$GLB,, | Performed by: PHYSICAL MEDICINE & REHABILITATION

## 2024-03-25 NOTE — PROGRESS NOTES
SUBJECTIVE:    Patient ID: Gil Cueva is a 59 y.o. female.    Chief Complaint: Low-back Pain    This is a 59-year-old woman I saw for the 1st and only time in March of 2022 with complaints of multiple joint aches on the right side of her body.  She was not having any significant complaints of spine pain at the time and I have recommended consultation with Rheumatology.  I have not seen her since then.  She presents to me now with an 8 month history of right-sided low back pain at the lumbosacral junction it radiates into the right groin.  This started after walking raise she participated in around that time.  She also hurt her right knee during that event then followed up with Dr. Luna with Orthopedics.  Ultimately she underwent arthroscopic right knee surgery in October of last year.  She says that her knee has been a priority and now that that is feeling better she has turned our attention to the right-sided low back pain.  She denies bowel or bladder dysfunction fever chills sweats or unexpected weight loss.  She takes Mobic chronically as needed for various aches and pains.  Primary care's put her on Zanaflex but she does not like the way it makes her feel.  Current pain level is 8/10 interferes with quality of life in terms of activities of daily living recreational and social activities.  I have no imaging to review          Past Medical History:   Diagnosis Date    Breast cancer     General anesthetics causing adverse effect in therapeutic use     woke up during ankle surgery    PONV (postoperative nausea and vomiting)      Social History     Socioeconomic History    Marital status:     Number of children: 1   Tobacco Use    Smoking status: Never    Smokeless tobacco: Never   Substance and Sexual Activity    Alcohol use: Yes    Drug use: No    Sexual activity: Not Currently     Social Determinants of Health     Financial Resource Strain: High Risk (1/31/2024)    Overall Financial Resource  Strain (CARDIA)     Difficulty of Paying Living Expenses: Very hard   Food Insecurity: Food Insecurity Present (1/31/2024)    Hunger Vital Sign     Worried About Running Out of Food in the Last Year: Sometimes true     Ran Out of Food in the Last Year: Sometimes true   Transportation Needs: No Transportation Needs (1/31/2024)    PRAPARE - Transportation     Lack of Transportation (Medical): No     Lack of Transportation (Non-Medical): No   Physical Activity: Sufficiently Active (1/31/2024)    Exercise Vital Sign     Days of Exercise per Week: 4 days     Minutes of Exercise per Session: 80 min   Stress: Stress Concern Present (1/31/2024)    [employer] [place] [employer]ational [employer] - Occupational Stress Questionnaire     Feeling of Stress : Very much   Social Connections: Moderately Isolated (1/31/2024)    Social Connection and Isolation Panel [NHANES]     Frequency of Communication with Friends and Family: Twice a week     Frequency of Social Gatherings with Friends and Family: Never     Attends Yazidism Services: Never     Active Member of Clubs or Organizations: Yes     Attends Club or Organization Meetings: 1 to 4 times per year     Marital Status:    Housing Stability: Low Risk  (1/31/2024)    Housing Stability Vital Sign     Unable to Pay for Housing in the Last Year: No     Number of Places Lived in the Last Year: 1     Unstable Housing in the Last Year: No     Past Surgical History:   Procedure Laterality Date    ANKLE SURGERY Right     2016    BREAST LUMPECTOMY Left 2008    CERVICAL FUSION N/A 20003    c-3, 4, 5    KNEE ARTHROSCOPY W/ MENISCECTOMY Right 10/24/2023    Procedure: ARTHROSCOPY, KNEE, WITH MENISCECTOMY;  Surgeon: Nicanor Luna MD;  Location: [facility];  Service: Orthopedics;  Laterality: Right;  lateral meniscectomy    ROTATOR CUFF REPAIR Right 2016    Dr. Perez     Family History   Problem Relation Age of Onset    Breast cancer Maternal Aunt     Melanoma Maternal Grandfather   "    Vitals:    03/25/24 1352   Weight: 85.3 kg (188 lb 0.8 oz)   Height: 5' 7" (1.702 m)       Review of Systems   Constitutional:  Negative for chills, diaphoresis, fatigue, fever and unexpected weight change.   HENT:  Negative for trouble swallowing.    Eyes:  Negative for visual disturbance.   Respiratory:  Negative for shortness of breath.    Cardiovascular:  Negative for chest pain.   Gastrointestinal:  Negative for abdominal pain, constipation, nausea and vomiting.   Genitourinary:  Negative for difficulty urinating.   Musculoskeletal:  Negative for arthralgias, back pain, gait problem, joint swelling, myalgias, neck pain and neck stiffness.   Neurological:  Negative for dizziness, speech difficulty, weakness, light-headedness, numbness and headaches.          Objective:      Physical Exam  Neurological:      Mental Status: She is alert and oriented to person, place, and time.      Comments: She is awake and in no acute distress  Mild tenderness to palpation right lumbar paraspinous musculature at the lumbosacral junction with no palpable masses  Forward flexion of the lumbar spine is to about 45° before complaint pain on the right at the lumbosacral junction.  Extension at 10° causes mild pain on the right at the lumbosacral junction  She can heel and toe walk normally  Reflexes- +1-+2 reflexes at the following:   C5-Biceps   C6-Brachioradialis   C7-Triceps   L3/4-Patellar   S1-Achilles   Izabel sign is negative bilaterally  Strength testing- 5/5 strength in the following muscle groups:  C5-Elbow flexion  C6-Wrist extension  C7-Elbow extension  C8-Finger flexion  T1-Finger abduction  L2-Hip flexion  L3-Knee extension  L4-Ankle dorsiflexion  L5-Great toe extension  S1-Ankle plantar flexion    MARRY test is negative bilaterally (specifically no pain radiating into the groin on either side)  Straight leg raise negative bilaterally                Assessment:       1. Chronic right-sided low back pain, " unspecified whether sciatica present           Plan:     She remains neurologically intact.  I suspect she has low back pain on basis of degenerative disc disease and facet arthropathy.  Possible radicular component to the right groin pain.  Does not seem to be intrinsic to the hip itself.  Recent x-rays of the hips are normal.  I think she can be treated conservatively going to get some x-rays on lumbar spine and start her in physical therapy.  She can continue Mobic as needed for pain.  Follow-up with me at the completion of therapy      Chronic right-sided low back pain, unspecified whether sciatica present  -     X-Ray Lumbar Complete Including Flex And Ext; Future; Expected date: 03/25/2024  -     Ambulatory referral/consult to Physical/Occupational Therapy; Future; Expected date: 04/01/2024

## 2024-03-26 ENCOUNTER — PATIENT MESSAGE (OUTPATIENT)
Dept: SPINE | Facility: CLINIC | Age: 59
End: 2024-03-26
Payer: MEDICARE

## 2024-04-02 RX ORDER — METHOCARBAMOL 500 MG/1
TABLET, FILM COATED ORAL
Qty: 60 TABLET | Refills: 0 | Status: SHIPPED | OUTPATIENT
Start: 2024-04-02

## 2024-04-03 ENCOUNTER — CLINICAL SUPPORT (OUTPATIENT)
Dept: REHABILITATION | Facility: HOSPITAL | Age: 59
End: 2024-04-03
Payer: MEDICARE

## 2024-04-03 DIAGNOSIS — G89.29 CHRONIC RIGHT-SIDED LOW BACK PAIN, UNSPECIFIED WHETHER SCIATICA PRESENT: ICD-10-CM

## 2024-04-03 DIAGNOSIS — M54.50 CHRONIC RIGHT-SIDED LOW BACK PAIN, UNSPECIFIED WHETHER SCIATICA PRESENT: ICD-10-CM

## 2024-04-03 DIAGNOSIS — M53.86 DECREASED RANGE OF MOTION OF LUMBAR SPINE: Primary | ICD-10-CM

## 2024-04-03 PROCEDURE — 97112 NEUROMUSCULAR REEDUCATION: CPT | Mod: PN

## 2024-04-03 PROCEDURE — 97161 PT EVAL LOW COMPLEX 20 MIN: CPT | Mod: PN

## 2024-04-03 NOTE — PLAN OF CARE
[facility]  Physical Therapy Initial Evaluation    Date: 4/3/2024   Name: Gil Cueva  Clinic Number: [id number]    Therapy Diagnosis:   Encounter Diagnoses   Name Primary?    Chronic right-sided low back pain, unspecified whether sciatica present     Decreased range of motion of lumbar spine Yes     Physician: Chandler Travis MD    Physician Orders: PT Eval and Treat   Medical Diagnosis from Referral: M54.50,G89.29 (ICD-10-CM) - Chronic right-sided low back pain, unspecified whether sciatica present   Evaluation Date: 4/3/2024  Authorization Period Expiration: 12/31/2024  Plan of Care Expiration: 5/31/2024  Visit # / Visits authorized: 1/ 1    Time In: 1000 am  Time Out: 1040 am  Total Appointment Time (timed & untimed codes): 40 minutes    Precautions: Standard    Subjective   Date of onset: 8 months  History of current condition - Gil reports: she went to a walk/race with her son and been having low back pain and hip pain ever since. She states prolonged sitting or standing makes it worse. She states the pain is primarily on the R side of the back and wraps around the hip. She states she also has been getting some pain along the L groin as well with prolonged walking. Patient does not report a specific mechanism of injury. Pt reports history of c2-c6 fusion/R knee menisectomy/R ankle ORIF years ago. She states occasionally she gets a tingling sensation along the R pinky toe. She denies any LOB/falls. Easing factors include nothing. Patient denies any bowel or bladder incontinence. Pt states she thinks she pulled a muscle.      Medical History:   Past Medical History:   Diagnosis Date    Breast cancer     General anesthetics causing adverse effect in therapeutic use     woke up during ankle surgery    PONV (postoperative nausea and vomiting)        Surgical History:   Gil Cueva  has a past surgical history that includes Rotator cuff repair (Right, 2016); Ankle  surgery (Right); Breast lumpectomy (Left, 2008); Cervical fusion (N/A, 20003); and Knee arthroscopy w/ meniscectomy (Right, 10/24/2023).    Medications:   Gil has a current medication list which includes the following prescription(s): aspirin-acetaminophen-caffeine 250-250-65 mg, ibuprofen, meloxicam, and methocarbamol, and the following Facility-Administered Medications: cyanocobalamin, electrolyte-s (ph 7.4), electrolyte-s (ph 7.4), lidocaine (pf) 10 mg/ml (1%), and ondansetron.    Allergies:   Review of patient's allergies indicates:   Allergen Reactions    Penicillins      I have no idea, my mother told me that  My mom said they gave it to me as a baby and it almost killed me    Sulfa (sulfonamide antibiotics)      I have no idea, my mother told me that    My mom said she is allergic to it so I am allergic to it        Imaging:  (3/25/2024) X-ray:   FINDINGS:  Bone density is normal.  There is a very gentle broad dextrocurvature.  The lumbar vertebral bodies maintain normal height.  There is no fracture.  There is subtle trace anterolisthesis of L4 on L5.  Otherwise, alignment is grossly normal.  There is no significant disc space narrowing.  There is mild facet joint arthropathy at the lower 3 lumbar levels.     Impression:     As above    Prior Therapy: N  Social History: lives with family / lives alone  Occupation: retired  Prior Level of Function: I  Current Level of Function: I; increased LBP    Pain:  Current: 8/10; Worst: 10/10; Best: 5/10   Location: low back  Description: Aching  Aggravating Factors: Sitting and Standing  Easing Factors: nothing    Pt's goals: decrease pain  Objective   Observation: calm and cooperative     Gait:   Increased lumbar rotation   Increased trunk sway    SLB: unable on R; 5s on L    Posture:     Anterior pelvic tilt   Hinge point L2-L3     Dermatomes Right Left Comments   L2 Intact Intact     L3 Intact Intact     L4 Intact Intact     L5 Intact Intact     S1 Intact Intact      S2 Intact Intact                   Myotomes Right Left Comments   L2 4/5 4/5     L3 5/5 5/5     L4 5/5 5/5     L5 5/5 5/5     S1 5/5 5/5     S2 5/5 5/5        Lumbar Range of Motion:     Limitations Pain Normal   Flexion 10%    N       40-50 degrees   Extension 25%    Y        20 degrees   Left Side Bending 25% Y; ride side       20 degrees   Right Side Bending 25% N       20 degrees       Hip Passive Range of Motion:    Right  Left  Normal   Flexion 120 115 120 degrees    Extension 20 20 20 degrees    Ext. Rotation 50 50 45-60 degrees   Int. Rotation 45 45 35 degrees          Lower Extremity Strength  Right LE   Left LE                       Hip extension:  3-/5, increased lumbar activation Hip extension: 3-/5, back painand increased lumbar activation   Hip abduction:  3+/5 Hip abduction:  3+/5     Special Tests:  - Flexion preference: yes  - Extension preference: no  - Slump: (-)  - Quadrant test: + for pain with opening when rotating L  - Logroll:  (-)    Joint Mobility:    B Hips: WNL    Thoracic spine: hypomobile    Sensation: WNL    Limitation/Restriction for FOTO Lumbar Survey    Therapist reviewed FOTO scores for Gil Cueva on 4/3/2024.   FOTO documents entered into [employer] - see Media section.    Score: 46%  Predicted Score: 56%         TREATMENT   Treatment Time In: 1025am  Treatment Time Out: 1040am  Total Treatment time (time-based codes) separate from Evaluation: 15 minutes    Gil received neuromuscular re-education to develop sense, coordination, and core stabilization for 15 minutes including:    Supine LTR x 10 B   Supine BKFO with RTB 2 x 10 B  SL Open Books x 15 B  Standing Hip Ext leaning on mat x 10 B   Education - healthy back / core stability / HEP / POC    Home Exercises and Patient Education Provided    Education provided:   - HEP  - Prognosis/POC    Written Home Exercises Provided: yes.  Exercises were reviewed and Gil was able to demonstrate them prior to the end of the  session.  Gil demonstrated good  understanding of the education provided.     See EMR under Patient Instructions for exercises provided 4/3/2024.    Assessment   Gil is a 59 y.o. female referred to outpatient Physical Therapy with a medical diagnosis of Chronic right-sided low back pain, unspecified whether sciatica present . Patient has signs and symptoms presenting as facet dysfunction and movement coordination deficits. Pt presents with painful and limited lumbar AROM, LE weakness, and functional limitations of difficulty with prolonged standing/walking. Patient would benefit from skilled PT to address these deficits and return to PLOF. Pt is agreeable to join healthy back program for management of chronic back pain.     Pt to be seen 2x/week for 8x weeks     Pt prognosis is Good.   Pt will benefit from skilled outpatient Physical Therapy to address the deficits stated above and in the chart below, provide pt/family education, and to maximize pt's level of independence.     Plan of care discussed with patient: Yes  Pt's spiritual, cultural and educational needs considered and patient is agreeable to the plan of care and goals as stated below:     Anticipated Barriers for therapy: chronicity    Medical Necessity is demonstrated by the following  History  Co-morbidities and personal factors that may impact the plan of care Co-morbidities:   history of cancer, high BMI    Personal Factors:   no deficits     low   Examination  Body Structures and Functions, activity limitations and participation restrictions that may impact the plan of care Body Regions:   back  lower extremities  trunk    Body Systems:    gross symmetry  ROM  strength  gross coordinated movement  balance  gait  transfers  motor control    Participation Restrictions:   none    Activity limitations:   no deficits    General Tasks and Commands  no deficits    Communication  no deficits    Mobility  lifting and carrying objects  walking    Self  care  no deficits    Domestic Life  doing house work (cleaning house, washing dishes, laundry)  assisting others    Interactions/Relationships  no deficits    Life Areas  no deficits    Community and Social Life  community life  recreation and leisure         low   Clinical Presentation stable and uncomplicated low   Decision Making/ Complexity Score: low     Goals:  Short Term Goals: 4 weeks  1. Patient will be independent with HEP in order to supplement pain free lumbar ROM - PROGRESSING, NOT MET  2. Pt will improve Lumbar spine ROM to full and pain-free to demonstrate improved lumbar mechanics - PROGRESSING, NOT MET  3. Patient will improve hip ext MMT to 3+/5 without lumbar spine activation to demonstrate improve glute stability for normalized gait- PROGRESSING, NOT MET     Long Term Goals: 8 weeks   1. Pt will improve lumbar FOTO survey to </= predicted% limited in order to return to ADLs without limitation - PROGRESSING, NOT MET  2. Patient will improve hip strength to at least 4/5 bilaterally for improved trunk support. - PROGRESSING, NOT MET  3. Pt will report no pain during home ADL's in order to promote functional mobility - PROGRESSING, NOT MET    Plan   Plan of care Certification: 4/3/2024 to 5/31/2024.    Outpatient Physical Therapy 2 times weekly for 8 weeks to include the following interventions: Gait Training, Manual Therapy, Moist Heat/ Ice, Neuromuscular Re-ed, Patient Education, Self Care, Therapeutic Activities, and Therapeutic Exercise.     Conrado Calix, PT

## 2024-04-05 ENCOUNTER — TELEPHONE (OUTPATIENT)
Dept: HEMATOLOGY/ONCOLOGY | Facility: CLINIC | Age: 59
End: 2024-04-05

## 2024-04-08 ENCOUNTER — LAB VISIT (OUTPATIENT)
Dept: LAB | Facility: HOSPITAL | Age: 59
End: 2024-04-08
Attending: INTERNAL MEDICINE
Payer: MEDICARE

## 2024-04-08 DIAGNOSIS — Z17.0 MALIGNANT NEOPLASM OF CENTRAL PORTION OF LEFT BREAST IN FEMALE, ESTROGEN RECEPTOR POSITIVE: Chronic | ICD-10-CM

## 2024-04-08 DIAGNOSIS — C50.112 MALIGNANT NEOPLASM OF CENTRAL PORTION OF LEFT BREAST IN FEMALE, ESTROGEN RECEPTOR POSITIVE: Chronic | ICD-10-CM

## 2024-04-08 LAB
ALBUMIN SERPL BCP-MCNC: 4.6 G/DL (ref 3.5–5.2)
ALP SERPL-CCNC: 53 U/L (ref 55–135)
ALT SERPL W/O P-5'-P-CCNC: 11 U/L (ref 10–44)
ANION GAP SERPL CALC-SCNC: 9 MMOL/L (ref 8–16)
AST SERPL-CCNC: 15 U/L (ref 10–40)
BASOPHILS # BLD AUTO: 0.03 K/UL (ref 0–0.2)
BASOPHILS NFR BLD: 0.5 % (ref 0–1.9)
BILIRUB SERPL-MCNC: 0.4 MG/DL (ref 0.1–1)
BUN SERPL-MCNC: 15 MG/DL (ref 6–20)
CALCIUM SERPL-MCNC: 9.5 MG/DL (ref 8.7–10.5)
CHLORIDE SERPL-SCNC: 105 MMOL/L (ref 95–110)
CO2 SERPL-SCNC: 26 MMOL/L (ref 23–29)
CREAT SERPL-MCNC: 0.7 MG/DL (ref 0.5–1.4)
DIFFERENTIAL METHOD BLD: NORMAL
EOSINOPHIL # BLD AUTO: 0 K/UL (ref 0–0.5)
EOSINOPHIL NFR BLD: 0.5 % (ref 0–8)
ERYTHROCYTE [DISTWIDTH] IN BLOOD BY AUTOMATED COUNT: 12.4 % (ref 11.5–14.5)
EST. GFR  (NO RACE VARIABLE): >60 ML/MIN/1.73 M^2
GLUCOSE SERPL-MCNC: 86 MG/DL (ref 70–110)
HCT VFR BLD AUTO: 40.4 % (ref 37–48.5)
HGB BLD-MCNC: 13.1 G/DL (ref 12–16)
IMM GRANULOCYTES # BLD AUTO: 0.01 K/UL (ref 0–0.04)
IMM GRANULOCYTES NFR BLD AUTO: 0.2 % (ref 0–0.5)
LYMPHOCYTES # BLD AUTO: 2 K/UL (ref 1–4.8)
LYMPHOCYTES NFR BLD: 30.2 % (ref 18–48)
MCH RBC QN AUTO: 31 PG (ref 27–31)
MCHC RBC AUTO-ENTMCNC: 32.4 G/DL (ref 32–36)
MCV RBC AUTO: 96 FL (ref 82–98)
MONOCYTES # BLD AUTO: 0.5 K/UL (ref 0.3–1)
MONOCYTES NFR BLD: 6.8 % (ref 4–15)
NEUTROPHILS # BLD AUTO: 4.1 K/UL (ref 1.8–7.7)
NEUTROPHILS NFR BLD: 61.8 % (ref 38–73)
NRBC BLD-RTO: 0 /100 WBC
PLATELET # BLD AUTO: 345 K/UL (ref 150–450)
PMV BLD AUTO: 9.8 FL (ref 9.2–12.9)
POTASSIUM SERPL-SCNC: 4.5 MMOL/L (ref 3.5–5.1)
PROT SERPL-MCNC: 7.2 G/DL (ref 6–8.4)
RBC # BLD AUTO: 4.22 M/UL (ref 4–5.4)
SODIUM SERPL-SCNC: 140 MMOL/L (ref 136–145)
WBC # BLD AUTO: 6.6 K/UL (ref 3.9–12.7)

## 2024-04-08 PROCEDURE — 80053 COMPREHEN METABOLIC PANEL: CPT | Performed by: INTERNAL MEDICINE

## 2024-04-08 PROCEDURE — 85025 COMPLETE CBC W/AUTO DIFF WBC: CPT | Performed by: INTERNAL MEDICINE

## 2024-04-08 PROCEDURE — 36415 COLL VENOUS BLD VENIPUNCTURE: CPT | Performed by: INTERNAL MEDICINE

## 2024-04-09 ENCOUNTER — HOSPITAL ENCOUNTER (OUTPATIENT)
Dept: RADIOLOGY | Facility: HOSPITAL | Age: 59
Discharge: HOME | End: 2024-04-09
Attending: INTERNAL MEDICINE
Payer: MEDICARE

## 2024-04-09 VITALS — HEIGHT: 67 IN | BODY MASS INDEX: 29.51 KG/M2 | WEIGHT: 188 LBS

## 2024-04-09 DIAGNOSIS — Z12.31 ENCOUNTER FOR SCREENING MAMMOGRAM FOR MALIGNANT NEOPLASM OF BREAST: ICD-10-CM

## 2024-04-09 DIAGNOSIS — C50.112 MALIGNANT NEOPLASM OF CENTRAL PORTION OF LEFT BREAST IN FEMALE, ESTROGEN RECEPTOR POSITIVE: Chronic | ICD-10-CM

## 2024-04-09 DIAGNOSIS — Z17.0 MALIGNANT NEOPLASM OF CENTRAL PORTION OF LEFT BREAST IN FEMALE, ESTROGEN RECEPTOR POSITIVE: Chronic | ICD-10-CM

## 2024-04-09 PROCEDURE — 77067 SCR MAMMO BI INCL CAD: CPT | Mod: 26,,, | Performed by: RADIOLOGY

## 2024-04-09 PROCEDURE — 77063 BREAST TOMOSYNTHESIS BI: CPT | Mod: 26,,, | Performed by: RADIOLOGY

## 2024-04-09 PROCEDURE — 77067 SCR MAMMO BI INCL CAD: CPT | Mod: TC,PO

## 2024-04-10 ENCOUNTER — OFFICE VISIT (OUTPATIENT)
Dept: HEMATOLOGY/ONCOLOGY | Facility: CLINIC | Age: 59
End: 2024-04-10
Payer: MEDICARE

## 2024-04-10 VITALS
WEIGHT: 192.19 LBS | DIASTOLIC BLOOD PRESSURE: 82 MMHG | BODY MASS INDEX: 30.1 KG/M2 | TEMPERATURE: 98 F | RESPIRATION RATE: 17 BRPM | HEART RATE: 73 BPM | SYSTOLIC BLOOD PRESSURE: 154 MMHG

## 2024-04-10 DIAGNOSIS — C50.112 MALIGNANT NEOPLASM OF CENTRAL PORTION OF LEFT BREAST IN FEMALE, ESTROGEN RECEPTOR POSITIVE: Chronic | ICD-10-CM

## 2024-04-10 DIAGNOSIS — Z17.0 MALIGNANT NEOPLASM OF CENTRAL PORTION OF LEFT BREAST IN FEMALE, ESTROGEN RECEPTOR POSITIVE: Chronic | ICD-10-CM

## 2024-04-10 DIAGNOSIS — G93.9 BRAIN LESION: Primary | ICD-10-CM

## 2024-04-10 PROCEDURE — 3008F BODY MASS INDEX DOCD: CPT | Mod: CPTII,S$GLB,, | Performed by: INTERNAL MEDICINE

## 2024-04-10 PROCEDURE — 3077F SYST BP >= 140 MM HG: CPT | Mod: CPTII,S$GLB,, | Performed by: INTERNAL MEDICINE

## 2024-04-10 PROCEDURE — 3079F DIAST BP 80-89 MM HG: CPT | Mod: CPTII,S$GLB,, | Performed by: INTERNAL MEDICINE

## 2024-04-10 PROCEDURE — 99213 OFFICE O/P EST LOW 20 MIN: CPT | Mod: S$GLB,,, | Performed by: INTERNAL MEDICINE

## 2024-04-10 PROCEDURE — 1159F MED LIST DOCD IN RCRD: CPT | Mod: CPTII,S$GLB,, | Performed by: INTERNAL MEDICINE

## 2024-04-10 RX ORDER — TERBINAFINE HYDROCHLORIDE 250 MG/1
250 TABLET ORAL DAILY
COMMUNITY
Start: 2024-04-07

## 2024-04-10 NOTE — ASSESSMENT & PLAN NOTE
These appear benign and she does not seem to have new symptoms at this time.  Will get repeat MRI at discretion of Dr. Muñoz whom she is seeing for this. Discussed yearly scans.
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
## 2024-04-16 ENCOUNTER — CLINICAL SUPPORT (OUTPATIENT)
Dept: REHABILITATION | Facility: HOSPITAL | Age: 59
End: 2024-04-16
Payer: MEDICARE

## 2024-04-16 DIAGNOSIS — M53.86 DECREASED RANGE OF MOTION OF LUMBAR SPINE: Primary | ICD-10-CM

## 2024-04-16 PROCEDURE — 97530 THERAPEUTIC ACTIVITIES: CPT | Mod: PN

## 2024-04-16 PROCEDURE — 97112 NEUROMUSCULAR REEDUCATION: CPT | Mod: PN
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
## 2024-04-18 ENCOUNTER — CLINICAL SUPPORT (OUTPATIENT)
Dept: REHABILITATION | Facility: HOSPITAL | Age: 59
End: 2024-04-18
Payer: MEDICARE

## 2024-04-18 DIAGNOSIS — M53.86 DECREASED RANGE OF MOTION OF LUMBAR SPINE: Primary | ICD-10-CM

## 2024-04-18 PROCEDURE — 97530 THERAPEUTIC ACTIVITIES: CPT | Mod: PN,CQ

## 2024-04-18 PROCEDURE — 97112 NEUROMUSCULAR REEDUCATION: CPT | Mod: PN,CQ

## 2024-04-18 PROCEDURE — 97110 THERAPEUTIC EXERCISES: CPT | Mod: PN,CQ
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
## 2024-04-22 NOTE — PROGRESS NOTES
[facility]      Name: Gil Cueva  Clinic Number: [id number]    Therapy Diagnosis:   No diagnosis found.    Physician: Chandler Travis MD    Visit Date: 2024    Physician Orders: PT Eval and Treat   Medical Diagnosis from Referral: M54.50,G89.29 (ICD-10-CM) - Chronic right-sided low back pain, unspecified whether sciatica present   Evaluation Date: 4/3/2024  Authorization Period Expiration: 2024  Plan of Care Expiration: 2024  Visit # / Visits authorized:     PTA Visit #:      Time In:  Time Out:   Total Billable Time:  minutes  INSURANCE and OUTCOMES: Fee for Service with FOTO Outcomes 1/3    Precautions: standard, history of cancer, and history of cervical fusion     Pattern of pain determined: 1 PEP    Subjective   Gil Cueva       Patient reports tolerating previous visit first tx session   Patient reports their pain to be  8-9/10 on a 0-10 scale with 0 being no pain and 10 being the worst pain imaginable.    Pain Location: R side low to mid back      Occupation: retired   Pt's goals: decrease pain     Objective     Baseline IM Testing Results:   Date of testin/15/2024  ROM 0-60 deg   Max Peak Torque 136    Min Peak Torque 52    Flex/Ext Ratio 2.6   % below normative data 28%     Outcomes:  Function Score: 46%  Predicted: 56%  Visit 6 Score:   Visit 10 Score:   Discharge Score:         Treatment    Gil received the treatments listed below:      Gil received neuromuscular education for 8 minutes via participation on the [place] Machine. Therapist assisted patient in isolating and engaging spinal stabilization musculature in order to improve functional ability and postural control. Patient performed exercise with therapist guidance in order to accurately use pacer function, avoid valsalva, and optimally exert effort within a safe and effective range via the Richard Exertion Rating Scale. Patient instructed to perform at a  midrange of exertion and to complete 15-20 repetitions within appropriate split time, with proper technique, and while maintaining safety.          Gil participated in neuromuscular re-education activities to improve balance, coordination, proprioception, motor control and/or posture for  minutes. The following activities were included:    Prone press ups x 7 reps (pain/popping in her shoulder when using palms)  Prone press ups on elbows/hands clasped x 10 reps (no pain in shoulder)  Prone hip extension x 10 reps   Open books x 10 reps (elbow at 90* at side)  Sidelying external rotation red theraband x 20 reps   Bridging with red theraband x 20 reps     Gil participated in therapeutic exercises to develop  for 19 minutes including:    Treadmill x 3 minutes @ 2.2 mph    Peripheral muscle strengthening which included one set of 15-20 repetitions at a slow and controlled 10-13 second per rep pace focused on strengthening supporting musculature in order to improve body mechanics and functional mobility.  Patient and therapist focused on proper form during treatment to ensure optimal strengthening of each targeted muscle group.  Machines utilized include torso rotation, chest press, triceps extension, bicep curl, and upright row. Leg extension, leg curl, leg press, and hip adduction/abduction added visit 3.    Gil participated in dynamic functional therapeutic activities to improve functional performance and simulate household and community activities for  minutes. The following activities were included:        manual therapy techniques:  were applied to the  for 8 minutes, including:  Soft Tissue Mobs, joint mobs, over pressure    Pt given cold pack for  minutes to in .    Home Exercises Provided and Patient Education Provided    Home exercises include:   Prone press ups and/or standing extensions over counter   Cardio program (V5): -  Lifting education (V11): -  Posture/ Using Lumbar Roll: educated   Fridge  Magnet Discharge handout (date given): -  Equipment at home/gym membership: yes     Education provided:   - PT role and Plan of Care   - Home exercise program     Written Home Exercises Provided: yes.  Exercises were reviewed and Gil was able to demonstrate them prior to the end of the session. Gil demonstrated good  understanding of the education provided.     See EMR under Patient Instructions for exercises provided 4/16/2024.    Assessment     Patient presents to PT today with reports of increased pain in her back.  Pt states her back really hurt her a lot for a day or 2 after her last PT session.  Her back is starting to feel better though.      Patient is making good progress towards established goals.  Pt will continue to benefit from skilled outpatient physical therapy to address the deficits stated in the impairment chart, provide pt/family education and to maximize pt's level of independence in the home and community environment.     Anticipated Barriers for therapy: history of cervical fusion and shoulder surgery   Pt's spiritual, cultural and educational needs considered and pt agreeable to plan of care and goals as stated below:     Goals:  GOALS: Pt is in agreement with the following goals.    Short term goals:  6 weeks or 10 visits   - Pt will demonstrate increased lumbar MedX ROM by at least 3 degrees from the initial ROM value with improvements noted in functional ROM and ability to perform ADLs. Appropriate and Ongoing  - Pt will demonstrate increased MedX average isometric strength value by 15% from initial test resulting in improved ability to perform bending, lifting, and carrying activities safely, confidently. Appropriate and Ongoing  - Pt will report a reduction in worst pain score by 1-2 points for improved tolerance for household activities/chores. Appropriate and Ongoing  - Pt able to perform HEP correctly with minimal cueing or supervision from therapist to encourage independent  management of symptoms. Appropriate and Ongoing    Long term goals: 10 weeks or 20 visits   - Pt will demonstrate increased lumbar MedX ROM by at least 6 degrees from initial ROM value, resulting in improved ability to perform functional forward bending while standing and sitting. Appropriate and Ongoing  - Pt will demonstrate increased MedX average isometric strength value by 30% from initial test resulting in improved ability to perform bending, lifting, and carrying activities safely and confidently. Appropriate and Ongoing  - Pt to demonstrate ability to independently control and reduce their pain through posture positioning and mechanical movements throughout a typical day. Appropriate and Ongoing  - Pt will demonstrate reduced pain and improved functional outcomes as reported on the FOTO by reaching an intake score of >/= 56% functional ability in order to demonstrate subjective improvement in patient's condition. . Appropriate and Ongoing  - Pt will demonstrate independence with the HEP at discharge. 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     Vanita Anders, PTA  4/22/2024

## 2024-04-23 ENCOUNTER — CLINICAL SUPPORT (OUTPATIENT)
Dept: REHABILITATION | Facility: HOSPITAL | Age: 59
End: 2024-04-23
Payer: MEDICARE

## 2024-04-23 DIAGNOSIS — M53.86 DECREASED RANGE OF MOTION OF LUMBAR SPINE: Primary | ICD-10-CM

## 2024-04-23 PROCEDURE — 97530 THERAPEUTIC ACTIVITIES: CPT | Mod: PN

## 2024-04-23 PROCEDURE — 97112 NEUROMUSCULAR REEDUCATION: CPT | Mod: PN

## 2024-04-23 PROCEDURE — 97110 THERAPEUTIC EXERCISES: CPT | Mod: PN

## 2024-04-23 NOTE — PROGRESS NOTES
"[facility]      Name: Gil Cueva  Clinic Number: [id number]    Therapy Diagnosis:   Encounter Diagnosis   Name Primary?    Decreased range of motion of lumbar spine Yes     Physician: Chandler Travis MD    Visit Date: 2024    Physician Orders: PT Eval and Treat   Medical Diagnosis from Referral: M54.50,G89.29 (ICD-10-CM) - Chronic right-sided low back pain, unspecified whether sciatica present   Evaluation Date: 4/3/2024  Authorization Period Expiration: 2024  Plan of Care Expiration: 2024  Visit # / Visits authorized:     PTA Visit #: 0 / 5     Time In: 900 AM  Time Out: 1010 AM  Total Billable Time: 60 minutes (10 minute ice)   INSURANCE and OUTCOMES: Fee for Service with FOTO Outcomes 1/3    Precautions: standard, history of cancer, and history of cervical fusion     Pattern of pain determined: 1 PEP    Subjective   Gil Cueva reports she is frustrated. Reports she thinks her doctor is on drugs because he wants her to try therapy first. "I have DDD" "It feels like a slipped disc" "There is something wrong" "this is going to be my last visit" Reports 10/10 pain. She reports constant pain. She is not sure of the aggravating factors as her symptoms have minimal change.     Patient reports tolerating previous visit first tx session   Patient reports their pain to be  10/10 on a 0-10 scale with 0 being no pain and 10 being the worst pain imaginable.    Pain Location: R side low to mid back      Occupation: retired   Pt's goals: decrease pain     Objective     Baseline IM Testing Results:   Date of testin/15/2024  ROM 0-60 deg   Max Peak Torque 136    Min Peak Torque 52    Flex/Ext Ratio 2.6   % below normative data 28%     Outcomes:  Function Score: 46%  Predicted: 56%  Visit 6 Score:   Visit 10 Score:   Discharge Score:     Treatment    Gil received the treatments listed below:      Gil received neuromuscular education for 8 " "minutes via participation on the [place] Machine. Therapist assisted patient in isolating and engaging spinal stabilization musculature in order to improve functional ability and postural control. Patient performed exercise with therapist guidance in order to accurately use pacer function, avoid valsalva, and optimally exert effort within a safe and effective range via the Richard Exertion Rating Scale. Patient instructed to perform at a midrange of exertion and to complete 15-20 repetitions within appropriate split time, with proper technique, and while maintaining safety.         4/30/2024    12:40 PM   [facility]   Visit Number 4   VAS Pain Rating 5   Treadmill Time (in min.) 3 min   Speed 2 mph   Lumbar Weight 72 lbs   Repetitions 20   Rating of Perceived Exertion 2     Gil participated in neuromuscular re-education activities to improve balance, coordination, proprioception, motor control and/or posture for 20 minutes. The following activities were included:    PPT 15 x 5 second , "I really feel it with this"   Open books L side 20 repetitions, unable to do R side due to pain   Bridging 20 repetitions   ER clams red theraband   Prone hip extension 2 x 10 each side   Seated thoracici extensions 10 repetitions , increased worse- it was hitting right on it     Gil participated in therapeutic exercises to develop  for 7 minutes including:    Treadmill x 3 minutes @ 2.2 mph    Torso machine only on 4/23/2024  Peripheral muscle strengthening which included one set of 15-20 repetitions at a slow and controlled 10-13 second per rep pace focused on strengthening supporting musculature in order to improve body mechanics and functional mobility.  Patient and therapist focused on proper form during treatment to ensure optimal strengthening of each targeted muscle group.  Machines utilized include torso rotation, chest press, triceps extension, bicep curl, and upright row. Leg extension, leg curl, leg " "press, and hip adduction/abduction added visit 3.    Gil participated in dynamic functional therapeutic activities to improve functional performance and simulate household and community activities for  25 minutes. The following activities were included:      Baselines:    Moderate limitation extension, pain R SG on R side     Standing repeated extensions 10 repetitions   Worse with R SG and standing extension, severe pain when she layed flat on the mat   DKTC 10 repetitions, no effect  DKTC 10 repetitions, no effect, pain on the way down, no worse   Prone on elbows x 1 minutes, "starting to get pissed"   Seated repeated flexion 10 repetitions, "feels like it is making it mad now"     Post mat activities: increased standing extension range of motion demonstrated, looser     manual therapy techniques:  were applied to the  for 8 minutes, including:  Soft Tissue Mobs, joint mobs, over pressure    Pt given cold pack for  minutes to in .    Home Exercises Provided and Patient Education Provided    Home exercises include:   Prone press ups and/or standing extensions over counter     Cardio program (V5): -  Lifting education (V11): -  Posture/ Using Lumbar Roll: educated   Fridge Magnet Discharge handout (date given): -  Equipment at home/gym membership: yes     Education provided:   - PT role and Plan of Care   - Home exercise program     Written Home Exercises Provided: yes.  Exercises were reviewed and Gil was able to demonstrate them prior to the end of the session. Gil demonstrated good  understanding of the education provided.     See EMR under Patient Instructions for exercises provided 4/16/2024.    Assessment     Arrives with reports of worsening back pain reporting this was going to be her last visit. Physical Therapist encouraged patient to give therapy a little more time since this was only her second tx session. She verbalized fair understanding. She arrives with continued frustration and feels she " needs imaging to confirm what is going on. Physical Therapist spent time educating patient on imaging and mechanical movements. She has been compliant with repeated standing extensions at home but not performing prone press ups due to shoulder pain. Repeated movement assessment continues. She does not demonstrate clear directional preference at this time. She denied relief with repeated extension and repeated flexion. Educated patient to try repeated flexion at home to assess change with this since extension has not helped. Minimal movements of the spine such as pelvic tilts were also uncomfortable. She was unable to complete R sided open books due to pain. She reports her symptoms are worsening so educated patient to reach out to MD if she feels the symptoms are worse for possible imaging and/or injection. She verbalized good understanding.     Patient is making good progress towards established goals.  Pt will continue to benefit from skilled outpatient physical therapy to address the deficits stated in the impairment chart, provide pt/family education and to maximize pt's level of independence in the home and community environment.     Anticipated Barriers for therapy: history of cervical fusion and shoulder surgery   Pt's spiritual, cultural and educational needs considered and pt agreeable to plan of care and goals as stated below:     Goals:  GOALS: Pt is in agreement with the following goals.    Short term goals:  6 weeks or 10 visits   - Pt will demonstrate increased lumbar MedX ROM by at least 3 degrees from the initial ROM value with improvements noted in functional ROM and ability to perform ADLs. Appropriate and Ongoing  - Pt will demonstrate increased MedX average isometric strength value by 15% from initial test resulting in improved ability to perform bending, lifting, and carrying activities safely, confidently. Appropriate and Ongoing  - Pt will report a reduction in worst pain score by 1-2 points  for improved tolerance for household activities/chores. Appropriate and Ongoing  - Pt able to perform HEP correctly with minimal cueing or supervision from therapist to encourage independent management of symptoms. Appropriate and Ongoing    Long term goals: 10 weeks or 20 visits   - Pt will demonstrate increased lumbar MedX ROM by at least 6 degrees from initial ROM value, resulting in improved ability to perform functional forward bending while standing and sitting. Appropriate and Ongoing  - Pt will demonstrate increased MedX average isometric strength value by 30% from initial test resulting in improved ability to perform bending, lifting, and carrying activities safely and confidently. Appropriate and Ongoing  - Pt to demonstrate ability to independently control and reduce their pain through posture positioning and mechanical movements throughout a typical day. Appropriate and Ongoing  - Pt will demonstrate reduced pain and improved functional outcomes as reported on the FOTO by reaching an intake score of >/= 56% functional ability in order to demonstrate subjective improvement in patient's condition. . Appropriate and Ongoing  - Pt will demonstrate independence with the HEP at discharge. 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     Marlene Marshall, PT  4/23/2024

## 2024-04-30 ENCOUNTER — CLINICAL SUPPORT (OUTPATIENT)
Dept: REHABILITATION | Facility: HOSPITAL | Age: 59
End: 2024-04-30
Payer: MEDICARE

## 2024-04-30 DIAGNOSIS — M53.86 DECREASED RANGE OF MOTION OF LUMBAR SPINE: Primary | ICD-10-CM

## 2024-04-30 PROCEDURE — 97530 THERAPEUTIC ACTIVITIES: CPT | Mod: PN

## 2024-04-30 PROCEDURE — 97112 NEUROMUSCULAR REEDUCATION: CPT | Mod: PN

## 2024-04-30 NOTE — PROGRESS NOTES
[facility]      Name: Gil Cueva  Clinic Number: [id number]    Therapy Diagnosis:   Encounter Diagnosis   Name Primary?    Decreased range of motion of lumbar spine Yes     Physician: Chandler Travis MD    Visit Date: 2024    Physician Orders: PT Eval and Treat   Medical Diagnosis from Referral: M54.50,G89.29 (ICD-10-CM) - Chronic right-sided low back pain, unspecified whether sciatica present   Evaluation Date: 4/3/2024  Authorization Period Expiration: 2024  Plan of Care Expiration: 2024  Visit # / Visits authorized:     PTA Visit #: 0 / 5     Time In: 1005 AM  Time Out: 1100 AM  Total Billable Time: 25 minutes  INSURANCE and OUTCOMES: Fee for Service with FOTO Outcomes 1/3    Precautions: standard, history of cancer, and history of cervical fusion     Pattern of pain determined: 5 ( updated 2024 )     Subjective   Gil Cueva reports the seated reaches help some but the pain is constant. There is no change in her symptoms.     Patient reports tolerating previous visit first tx session   Patient reports their pain to be  10/10 on a 0-10 scale with 0 being no pain and 10 being the worst pain imaginable.    Pain Location: R side low to mid back      Occupation: retired   Pt's goals: decrease pain     Objective     Baseline IM Testing Results:   Date of testin/15/2024  ROM 0-60 deg   Max Peak Torque 136    Min Peak Torque 52    Flex/Ext Ratio 2.6   % below normative data 28%     Outcomes:  Function Score: 46%  Predicted: 56%  Visit 6 Score:   Visit 10 Score:   Discharge Score:     Treatment    Gil received the treatments listed below:      Physical Therapy technician assisted with treatment under direct supervision of treating therapist. Patient received 1:1 treatments for 25 minutes with Physical Therapist.     Gil received neuromuscular education for 8 minutes via participation on the [employer] Machine. Therapist assisted  "patient in isolating and engaging spinal stabilization musculature in order to improve functional ability and postural control. Patient performed exercise with therapist guidance in order to accurately use pacer function, avoid valsalva, and optimally exert effort within a safe and effective range via the Richard Exertion Rating Scale. Patient instructed to perform at a midrange of exertion and to complete 15-20 repetitions within appropriate split time, with proper technique, and while maintaining safety.         4/30/2024    12:40 PM   [facility]   Visit Number 4   VAS Pain Rating 5   Treadmill Time (in min.) 3 min   Speed 2 mph   Lumbar Weight 72 lbs   Repetitions 20   Rating of Perceived Exertion 2        Gil participated in neuromuscular re-education activities to improve balance, coordination, proprioception, motor control and/or posture for 15 minutes. The following activities were included:    PPT 15 x 5 second  LTR 10 each side with physioball - feels good   Abdominal isometrics 15 x 5 second - "I feel something on the L side that I have never felt before"   Open books 15 repetitions each side, Pain reported with R rotation   Bridging - unable due to pain   ER clams red theraband 20 repetitions each side     NP:  Prone hip extension 2 x 10 each side   Seated thoracici extensions 10 repetitions , increased worse- it was hitting right on it     Gil participated in therapeutic exercises to develop for 22 minutes including:    Peripheral muscle strengthening which included one set of 15-20 repetitions at a slow and controlled 10-13 second per rep pace focused on strengthening supporting musculature in order to improve body mechanics and functional mobility.  Patient and therapist focused on proper form during treatment to ensure optimal strengthening of each targeted muscle group.  Machines utilized include torso rotation, chest press, triceps extension, bicep curl, and upright row. Leg extension, " leg curl, leg press, and hip adduction/abduction added visit 3.    Gil participated in dynamic functional therapeutic activities to improve functional performance and simulate household and community activities for 17 minutes. The following activities were included:    Baselines:    Standing extension: pain R side   Flexion: no pain    SG: R SG L hip    Rotation: pain R rotation on R side     Seated flexion reaches on chair leg self overpressure 10 repetitions, worse   Prone press up as able with modification due to R shoulder pain, worse  DKTC 10 repetitions no effect , reports better than seated reaches     manual therapy techniques:  were applied to the  for 0 minutes, including:  Soft Tissue Mobs, joint mobs, over pressure    Pt given cold pack for  minutes to in .    Home Exercises Provided and Patient Education Provided    Home exercises include:   Prone press ups and/or standing extensions over counter     Cardio program (V5): -  Lifting education (V11): -  Posture/ Using Lumbar Roll: educated   Fridge Magnet Discharge handout (date given): -  Equipment at home/gym membership: yes     Education provided:   - PT role and Plan of Care   - Home exercise program     Written Home Exercises Provided: yes.  Exercises were reviewed and Gil was able to demonstrate them prior to the end of the session. Gil demonstrated good  understanding of the education provided.     See EMR under Patient Instructions for exercises provided 4/16/2024.    Assessment     Arrives without change in back pain. She seems to respond better to repeated flexion but does not demonstrate a clear directional preference. She demonstrates signs and symptoms of pain pattern 5. Tx session focused on motor control and abdominal activate on and general mobility and strengthening. She reports discomfort with most movements and exercises but did report some relief with LTRs. Medx increased by 10% with continuation of 2/10 RPE.     Patient is  making good progress towards established goals.  Pt will continue to benefit from skilled outpatient physical therapy to address the deficits stated in the impairment chart, provide pt/family education and to maximize pt's level of independence in the home and community environment.     Anticipated Barriers for therapy: history of cervical fusion and shoulder surgery   Pt's spiritual, cultural and educational needs considered and pt agreeable to plan of care and goals as stated below:     Goals:  GOALS: Pt is in agreement with the following goals.    Short term goals:  6 weeks or 10 visits   - Pt will demonstrate increased lumbar MedX ROM by at least 3 degrees from the initial ROM value with improvements noted in functional ROM and ability to perform ADLs. Appropriate and Ongoing  - Pt will demonstrate increased MedX average isometric strength value by 15% from initial test resulting in improved ability to perform bending, lifting, and carrying activities safely, confidently. Appropriate and Ongoing  - Pt will report a reduction in worst pain score by 1-2 points for improved tolerance for household activities/chores. Appropriate and Ongoing  - Pt able to perform HEP correctly with minimal cueing or supervision from therapist to encourage independent management of symptoms. Appropriate and Ongoing    Long term goals: 10 weeks or 20 visits   - Pt will demonstrate increased lumbar MedX ROM by at least 6 degrees from initial ROM value, resulting in improved ability to perform functional forward bending while standing and sitting. Appropriate and Ongoing  - Pt will demonstrate increased MedX average isometric strength value by 30% from initial test resulting in improved ability to perform bending, lifting, and carrying activities safely and confidently. Appropriate and Ongoing  - Pt to demonstrate ability to independently control and reduce their pain through posture positioning and mechanical movements throughout a  typical day. Appropriate and Ongoing  - Pt will demonstrate reduced pain and improved functional outcomes as reported on the FOTO by reaching an intake score of >/= 56% functional ability in order to demonstrate subjective improvement in patient's condition. . Appropriate and Ongoing  - Pt will demonstrate independence with the HEP at discharge. 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     Marlene Marshall, PT  4/30/2024

## 2024-05-02 ENCOUNTER — CLINICAL SUPPORT (OUTPATIENT)
Dept: REHABILITATION | Facility: HOSPITAL | Age: 59
End: 2024-05-02
Payer: MEDICARE

## 2024-05-02 DIAGNOSIS — M53.86 DECREASED RANGE OF MOTION OF LUMBAR SPINE: Primary | ICD-10-CM

## 2024-05-02 PROCEDURE — 97110 THERAPEUTIC EXERCISES: CPT | Mod: PN

## 2024-05-02 PROCEDURE — 97112 NEUROMUSCULAR REEDUCATION: CPT | Mod: PN

## 2024-05-02 NOTE — PROGRESS NOTES
[facility]      Name: Gil Cueva  Clinic Number: [id number]    Therapy Diagnosis:   Encounter Diagnosis   Name Primary?    Decreased range of motion of lumbar spine Yes     Physician: Chandler Travis MD    Visit Date: 2024    Physician Orders: PT Eval and Treat   Medical Diagnosis from Referral: M54.50,G89.29 (ICD-10-CM) - Chronic right-sided low back pain, unspecified whether sciatica present   Evaluation Date: 4/3/2024  Authorization Period Expiration: 2024  Plan of Care Expiration: 2024  Visit # / Visits authorized:     PTA Visit #: 0 / 5     Time In: 905 AM  Time Out: 1005 AM  Total Billable Time: 60 minutes  INSURANCE and OUTCOMES: Fee for Service with FOTO Outcomes 1/3    Precautions: standard, history of cancer, and history of cervical fusion     Pattern of pain determined: 5 ( updated 2024 )     Subjective   Gil Cueva reports she got up from a kitchen chair about 45 minutes ago and her back felt like it locked up.     Patient reports tolerating previous visit first tx session   Patient reports their pain to be  10/10 on a 0-10 scale with 0 being no pain and 10 being the worst pain imaginable.    Pain Location: R side low to mid back      Occupation: retired   Pt's goals: decrease pain     Objective     Baseline IM Testing Results:   Date of testin/15/2024  ROM 0-60 deg   Max Peak Torque 136    Min Peak Torque 52    Flex/Ext Ratio 2.6   % below normative data 28%     Outcomes:  Function Score: 46%  Predicted: 56%  Visit 6 Score:   Visit 10 Score:   Discharge Score:     Treatment    Gil received the treatments listed below:      Gil received neuromuscular education for 8 minutes via participation on the [employer] Machine. Therapist assisted patient in isolating and engaging spinal stabilization musculature in order to improve functional ability and postural control. Patient performed exercise with therapist guidance  "in order to accurately use pacer function, avoid valsalva, and optimally exert effort within a safe and effective range via the Richard Exertion Rating Scale. Patient instructed to perform at a midrange of exertion and to complete 15-20 repetitions within appropriate split time, with proper technique, and while maintaining safety.         5/2/2024    10:07 AM   [facility]   Visit Number 5   VAS Pain Rating 5   Treadmill Time (in min.) 6 min   Speed 2 mph   Lumbar Weight 80 lbs   Repetitions 20   Rating of Perceived Exertion 3        Gil participated in neuromuscular re-education activities to improve balance, coordination, proprioception, motor control and/or posture for 20 minutes. The following activities were included:    Prone hip extension 2 x 10 each side   Open books 15 repetitions each side  Bridging 20 repetitions , no complaints of pain   ER clams red theraband, when performing R side complained of pain so removed theraband with slight improvement but ceased around 15 repetitions due to pain reporting "it feels like something is moving/ crawling" , L side no issue   +bird/dog 5 repetitions each side , used foam due to knee pain     NP:    PPT 15 x 5 second  LTR 10 each side with physioball - feels good   Abdominal isometrics 15 x 5 second - "I feel something on the L side that I have never felt before"   Seated thoracici extensions 10 repetitions , increased worse- it was hitting right on it     Gil participated in therapeutic exercises to develop for 27 minutes including:    Treadmill x 6 minutes for endurance and cardio   Peripheral muscle strengthening which included one set of 15-20 repetitions at a slow and controlled 10-13 second per rep pace focused on strengthening supporting musculature in order to improve body mechanics and functional mobility.  Patient and therapist focused on proper form during treatment to ensure optimal strengthening of each targeted muscle group.  Machines " "utilized include torso rotation, chest press, triceps extension, bicep curl, and upright row. Leg extension, leg curl, leg press, and hip adduction/abduction added visit 3.    Gil participated in dynamic functional therapeutic activities to improve functional performance and simulate household and community activities for 5 minutes. The following activities were included:    Baselines:    Standing extension: no pain    Flexion: no pain    SG: no pain    Rotation: no pain     Standing repeated extensions in free standing 2 x 10 , no pain   Standing squat with dowel along back x 8 repetitions, ceased 2/2 felt like her back was locking up, ceased and performed a set of standing extensions with improvement     NP:   Seated flexion reaches on chair leg self overpressure 10 repetitions, worse   Prone press up as able with modification due to R shoulder pain, worse  DKTC 10 repetitions no effect , reports better than seated reaches     manual therapy techniques:  were applied to the  for 0 minutes, including:  Soft Tissue Mobs, joint mobs, over pressure    Pt given cold pack for  minutes to in .    Home Exercises Provided and Patient Education Provided    Home exercises include:   Prone press ups and/or standing extensions over counter     Cardio program (V5): -  Lifting education (V11): -  Posture/ Using Lumbar Roll: educated   Fridge Magnet Discharge handout (date given): -  Equipment at home/gym membership: yes     Education provided:   - PT role and Plan of Care   - Home exercise program     Written Home Exercises Provided: yes.  Exercises were reviewed and Gil was able to demonstrate them prior to the end of the session. Gil demonstrated good  understanding of the education provided.     See EMR under Patient Instructions for exercises provided 4/16/2024.    Assessment     Arrives with complaints of sensation of "locking up". She tolerated treadmill well with standing assessment demonstrating minimal " "discomfort and minimal range of motion limitations. She demonstrates significant improvement in tolerance to bridging today compared to last tx session; however, she reported discomfort with clams on R side. She was able to perform prone hip extensions and bird/dog without complaints of pain. She reported locking up sensation occurring with standing squats so ceased and performed repeated standing extensions which seemed to improve her symptoms. Medx increased by 10% with good tolerance reporting 3/10 RPE. Most peripheral machines also progressed with continued reports of 2-3/10 RPE. Physical Therapist educated patient to perform more extensions throughout the day when she feels her back is stiff or has a "locking/stuck" sensation. Pt verbalized fair understanding.     Patient is making good progress towards established goals.  Pt will continue to benefit from skilled outpatient physical therapy to address the deficits stated in the impairment chart, provide pt/family education and to maximize pt's level of independence in the home and community environment.     Anticipated Barriers for therapy: history of cervical fusion and shoulder surgery   Pt's spiritual, cultural and educational needs considered and pt agreeable to plan of care and goals as stated below:     Goals:  GOALS: Pt is in agreement with the following goals.    Short term goals:  6 weeks or 10 visits   - Pt will demonstrate increased lumbar MedX ROM by at least 3 degrees from the initial ROM value with improvements noted in functional ROM and ability to perform ADLs. Appropriate and Ongoing  - Pt will demonstrate increased MedX average isometric strength value by 15% from initial test resulting in improved ability to perform bending, lifting, and carrying activities safely, confidently. Appropriate and Ongoing  - Pt will report a reduction in worst pain score by 1-2 points for improved tolerance for household activities/chores. Appropriate and " Ongoing  - Pt able to perform HEP correctly with minimal cueing or supervision from therapist to encourage independent management of symptoms. Appropriate and Ongoing    Long term goals: 10 weeks or 20 visits   - Pt will demonstrate increased lumbar MedX ROM by at least 6 degrees from initial ROM value, resulting in improved ability to perform functional forward bending while standing and sitting. Appropriate and Ongoing  - Pt will demonstrate increased MedX average isometric strength value by 30% from initial test resulting in improved ability to perform bending, lifting, and carrying activities safely and confidently. Appropriate and Ongoing  - Pt to demonstrate ability to independently control and reduce their pain through posture positioning and mechanical movements throughout a typical day. Appropriate and Ongoing  - Pt will demonstrate reduced pain and improved functional outcomes as reported on the FOTO by reaching an intake score of >/= 56% functional ability in order to demonstrate subjective improvement in patient's condition. . Appropriate and Ongoing  - Pt will demonstrate independence with the HEP at discharge. Appropriate and Ongoing  - Patient will report >/= 50% improvement in pain and function to improve ability to care for special needs son Appropriate and Ongoing    Plan   Continue with established Plan of Care towards established PT goals.     Marlene Marshall, PT  5/2/2024

## 2024-05-07 ENCOUNTER — CLINICAL SUPPORT (OUTPATIENT)
Dept: REHABILITATION | Facility: HOSPITAL | Age: 59
End: 2024-05-07
Payer: MEDICARE

## 2024-05-07 DIAGNOSIS — M53.86 DECREASED RANGE OF MOTION OF LUMBAR SPINE: Primary | ICD-10-CM

## 2024-05-07 PROCEDURE — 97530 THERAPEUTIC ACTIVITIES: CPT | Mod: PN

## 2024-05-07 PROCEDURE — 97110 THERAPEUTIC EXERCISES: CPT | Mod: PN

## 2024-05-07 NOTE — PROGRESS NOTES
[facility]      Name: Gil Cueva  Clinic Number: [id number]    Therapy Diagnosis:   Encounter Diagnosis   Name Primary?    Decreased range of motion of lumbar spine Yes     Physician: Chandler Travis MD    Visit Date: 2024    Physician Orders: PT Eval and Treat   Medical Diagnosis from Referral: M54.50,G89.29 (ICD-10-CM) - Chronic right-sided low back pain, unspecified whether sciatica present   Evaluation Date: 4/3/2024  Authorization Period Expiration: 2024  Plan of Care Expiration: 2024  Visit # / Visits authorized:     PTA Visit #: 0 / 5     Time In: 902 AM  Time Out: 1002 AM  Total Billable Time: 25 minutes (total time: 50)  INSURANCE and OUTCOMES: Fee for Service with FOTO Outcomes 1/3    Precautions: standard, history of cancer, and history of cervical fusion     Pattern of pain determined: 5 ( updated 2024 )     Subjective   Gil Cueva reports  her L hip is now starting to bother her which is a new thing. R mid to low back pain remains the same.  is having part of colon removed; she is not sure what his recovery will be like and her attendance for the next few weeks.     Patient reports tolerating previous visit first tx session   Patient reports their pain to be  10/10 on a 0-10 scale with 0 being no pain and 10 being the worst pain imaginable.    Pain Location: R side low to mid back      Occupation: retired   Pt's goals: decrease pain     Objective     Baseline IM Testing Results:   Date of testin/15/2024  ROM 0-60 deg   Max Peak Torque 136    Min Peak Torque 52    Flex/Ext Ratio 2.6   % below normative data 28%     Outcomes:  Function Score: 46%  Predicted: 56%  Visit 6 Score:   Visit 10 Score:   Discharge Score:     Treatment    Gil received the treatments listed below:      Gil received neuromuscular education for 8 minutes via participation on the [employer] Machine. Therapist assisted patient in  "isolating and engaging spinal stabilization musculature in order to improve functional ability and postural control. Patient performed exercise with therapist guidance in order to accurately use pacer function, avoid valsalva, and optimally exert effort within a safe and effective range via the Richard Exertion Rating Scale. Patient instructed to perform at a midrange of exertion and to complete 15-20 repetitions within appropriate split time, with proper technique, and while maintaining safety.         5/7/2024    10:48 AM   [facility]   Visit Number 6   VAS Pain Rating 5   Treadmill Time (in min.) 3 min   Speed 2 mph   Lumbar Weight 88 lbs   Repetitions 20   Rating of Perceived Exertion 3     Gil participated in neuromuscular re-education activities to improve balance, coordination, proprioception, motor control and/or posture for 10 minutes. The following activities were included:    Prone hip extension 2 x 10 each side   Open books 10 repetitions each side, ceased with rotation to the R due to pain in R mid to low back region   Bridging 20 repetitions, initially painful but this improved following prone on elbows    Attempted ER clams ceased 2/2 R low back pain, performed prone lie on elbows with improvement   Attempted PPT unable due to pain     NP:  +bird/dog 5 repetitions each side , used foam due to knee pain   PPT 15 x 5 second  LTR 10 each side with physioball - feels good   Abdominal isometrics 15 x 5 second - "I feel something on the L side that I have never felt before"   Seated thoracici extensions 10 repetitions , increased worse- it was hitting right on it     Gil participated in therapeutic exercises to develop for 26 minutes including:    Treadmill x 3 minutes for endurance and cardio   Peripheral muscle strengthening which included one set of 15-20 repetitions at a slow and controlled 10-13 second per rep pace focused on strengthening supporting musculature in order to improve body " mechanics and functional mobility.  Patient and therapist focused on proper form during treatment to ensure optimal strengthening of each targeted muscle group.  Machines utilized include torso rotation, chest press, triceps extension, bicep curl, and upright row. Leg extension, leg curl, leg press, and hip adduction/abduction.     Gil participated in dynamic functional therapeutic activities to improve functional performance and simulate household and community activities for 6 minutes. The following activities were included:    Baselines:    Standing extension: no pain    Flexion: no pain    SG: pain L hip with R SG    Rotation: no pain , pain R side with R rotation     Standing repeated extensions in free standing over edge of mat 10 repetitions, decrease better with R SG   Prone on elbows 2 x 2 minutes     NP:     Standing squat with dowel along back x 8 repetitions, ceased 2/2 felt like her back was locking up, ceased and performed a set of standing extensions with improvement   Seated flexion reaches on chair leg self overpressure 10 repetitions, worse   Prone press up as able with modification due to R shoulder pain, worse  DKTC 10 repetitions no effect , reports better than seated reaches     manual therapy techniques:  were applied to the  for 0 minutes, including:  Soft Tissue Mobs, joint mobs, over pressure    Pt given cold pack for  minutes to in .    Home Exercises Provided and Patient Education Provided    Home exercises include:   Prone press ups and/or standing extensions over counter     Cardio program (V5): -  Lifting education (V11): -  Posture/ Using Lumbar Roll: educated   Fridge [employer] Discharge handout (date given): -  Equipment at home/gym membership: yes     Education provided:   - PT role and Plan of Care   - Home exercise program     Written Home Exercises Provided: yes.  Exercises were reviewed and Gil was able to demonstrate them prior to the end of the session. Gil  demonstrated good  understanding of the education provided.     See EMR under Patient Instructions for exercises provided 4/16/2024.    Assessment     Arrives with new pain located at her L hip. Standing extensions improved her L hip pain when rechecking baselines. Attempted supine bridges and PPT but unable due to pain so assumed prone on elbows with improvement in pain and then she was able to complete the bridges. R sidelying open books were also painful so ceased and performed only the left side. Today she responded well to extensions based exercises such as prone on elbows and standing extensions so educated patient to focus only on these at home for now. She verbalized good understanding.     Patient is making good progress towards established goals.  Pt will continue to benefit from skilled outpatient physical therapy to address the deficits stated in the impairment chart, provide pt/family education and to maximize pt's level of independence in the home and community environment.     Anticipated Barriers for therapy: history of cervical fusion and shoulder surgery   Pt's spiritual, cultural and educational needs considered and pt agreeable to plan of care and goals as stated below:     Goals:  GOALS: Pt is in agreement with the following goals.    Short term goals:  6 weeks or 10 visits   - Pt will demonstrate increased lumbar MedX ROM by at least 3 degrees from the initial ROM value with improvements noted in functional ROM and ability to perform ADLs. Appropriate and Ongoing  - Pt will demonstrate increased MedX average isometric strength value by 15% from initial test resulting in improved ability to perform bending, lifting, and carrying activities safely, confidently. Appropriate and Ongoing  - Pt will report a reduction in worst pain score by 1-2 points for improved tolerance for household activities/chores. Appropriate and Ongoing  - Pt able to perform HEP correctly with minimal cueing or supervision  from therapist to encourage independent management of symptoms. Appropriate and Ongoing    Long term goals: 10 weeks or 20 visits   - Pt will demonstrate increased lumbar MedX ROM by at least 6 degrees from initial ROM value, resulting in improved ability to perform functional forward bending while standing and sitting. Appropriate and Ongoing  - Pt will demonstrate increased MedX average isometric strength value by 30% from initial test resulting in improved ability to perform bending, lifting, and carrying activities safely and confidently. Appropriate and Ongoing  - Pt to demonstrate ability to independently control and reduce their pain through posture positioning and mechanical movements throughout a typical day. Appropriate and Ongoing  - Pt will demonstrate reduced pain and improved functional outcomes as reported on the FOTO by reaching an intake score of >/= 56% functional ability in order to demonstrate subjective improvement in patient's condition. . Appropriate and Ongoing  - Pt will demonstrate independence with the HEP at discharge. Appropriate and Ongoing  - Patient will report >/= 50% improvement in pain and function to improve ability to care for special needs son Appropriate and Ongoing    Plan   Continue with established Plan of Care towards established PT goals.     Marlene Marshall, PT  5/7/2024

## 2024-05-08 NOTE — PROGRESS NOTES
[facility]      Name: Gil Cueva  Clinic Number: [id number]    Therapy Diagnosis:   Encounter Diagnosis   Name Primary?    Decreased range of motion of lumbar spine Yes       Physician: Chandler Travis MD    Visit Date: 2024    Physician Orders: PT Eval and Treat   Medical Diagnosis from Referral: M54.50,G89.29 (ICD-10-CM) - Chronic right-sided low back pain, unspecified whether sciatica present   Evaluation Date: 4/3/2024  Authorization Period Expiration: 2024  Plan of Care Expiration: 2024  Visit # / Visits authorized:     PTA Visit #:      Time In: 0900  Time Out: 0955  Total Billable Time: 55 minutes   INSURANCE and OUTCOMES: Fee for Service with FOTO Outcomes 1/3    Precautions: standard, history of cancer, and history of cervical fusion     Pattern of pain determined: 5 ( updated 2024 )     Subjective   Gil Cueva reports  she is not having pain today.    Patient reports tolerating previous visit first tx session   Patient reports their pain to be  /10 on a 0-10 scale with 0 being no pain and 10 being the worst pain imaginable.    Pain Location: R side low to mid back      Occupation: retired   Pt's goals: decrease pain     Objective     Baseline IM Testing Results:   Date of testin/15/2024  ROM 0-60 deg   Max Peak Torque 136    Min Peak Torque 52    Flex/Ext Ratio 2.6   % below normative data 28%     Outcomes:  Function Score: 46%  Predicted: 56%  Visit 6 Score:   Visit 10 Score:   Discharge Score:     Treatment    Gil received the treatments listed below:      Gil received neuromuscular education for 8 minutes via participation on the [employer] Machine. Therapist assisted patient in isolating and engaging spinal stabilization musculature in order to improve functional ability and postural control. Patient performed exercise with therapist guidance in order to accurately use pacer function, avoid valsalva, and  "optimally exert effort within a safe and effective range via the Richard Exertion Rating Scale. Patient instructed to perform at a midrange of exertion and to complete 15-20 repetitions within appropriate split time, with proper technique, and while maintaining safety.         5/9/2024    10:31 AM   [facility]   Visit Number 7   Treadmill Time (in min.) 7 min   Speed 1.7 mph   Extension in Lying 10   Extension in Standing 20   Lumbar Weight 88 lbs   Repetitions 20   Rating of Perceived Exertion 3        Gil participated in neuromuscular re-education activities to improve balance, coordination, proprioception, motor control and/or posture for 20 minutes. The following activities were included:    Prone hip extension 2 x 10 each side   Prone press ups x 10 reps   Open books 2 x 10 repetitions each side  Bridging with red theraband x 20 repetitions  Toe taps x 20 reps   bird/dog x 4 repetitions each side, blue foam but stopped too painful    Not Performed:  Posterior pelvic tilts  15 x 5 second  Lower trunk rotation  10 each side with physioball - feels good   Abdominal isometrics 15 x 5 second - "I feel something on the L side that I have never felt before"   Seated thoracic extensions 10 repetitions , increased worse- it was hitting right on it     Gil participated in therapeutic exercises to develop for 19 minutes including:    Treadmill x 3 minutes for endurance and cardio   Peripheral muscle strengthening which included one set of 15-20 repetitions at a slow and controlled 10-13 second per rep pace focused on strengthening supporting musculature in order to improve body mechanics and functional mobility.  Patient and therapist focused on proper form during treatment to ensure optimal strengthening of each targeted muscle group.  Machines utilized include torso rotation, chest press, triceps extension, bicep curl, and upright row. Leg extension, leg curl, leg press, and hip adduction/abduction. "     Gil participated in dynamic functional therapeutic activities to improve functional performance and simulate household and community activities for 8 minutes. The following activities were included:    Standing repeated extensions in free standing 2 x  10 repetitions  Prone on elbows 2 x 2 minutes       NP:   Standing squat with dowel along back x 8 repetitions, ceased 2/2 felt like her back was locking up, ceased and performed a set of standing extensions with improvement   Seated flexion reaches on chair leg self overpressure 10 repetitions, worse   Double knee to chest  10 repetitions no effect , reports better than seated reaches     manual therapy techniques:  were applied to the  for 0 minutes, including:  Soft Tissue Mobs, joint mobs, over pressure    Pt given cold pack for  minutes to in .    Home Exercises Provided and Patient Education Provided    Home exercises include:   Prone press ups and/or standing extensions over counter     Cardio program (V5): -  Lifting education (V11): -  Posture/ Using Lumbar Roll: educated   Fridge Magnet Discharge handout (date given): -  Equipment at home/gym membership: yes     Education provided:   - PT role and Plan of Care   - Home exercise program     Written Home Exercises Provided: yes.  Exercises were reviewed and Gil was able to demonstrate them prior to the end of the session. Gil demonstrated good  understanding of the education provided.     See EMR under Patient Instructions for exercises provided 4/16/2024.    Assessment   Patient continues to do well with her exercises and is progressing.  She did not have any pain today.      Patient is making good progress towards established goals.  Pt will continue to benefit from skilled outpatient physical therapy to address the deficits stated in the impairment chart, provide pt/family education and to maximize pt's level of independence in the home and community environment.     Anticipated Barriers for  therapy: history of cervical fusion and shoulder surgery   Pt's spiritual, cultural and educational needs considered and pt agreeable to plan of care and goals as stated below:     Goals:  GOALS: Pt is in agreement with the following goals.    Short term goals:  6 weeks or 10 visits   - Pt will demonstrate increased lumbar MedX ROM by at least 3 degrees from the initial ROM value with improvements noted in functional ROM and ability to perform ADLs. Appropriate and Ongoing  - Pt will demonstrate increased MedX average isometric strength value by 15% from initial test resulting in improved ability to perform bending, lifting, and carrying activities safely, confidently. Appropriate and Ongoing  - Pt will report a reduction in worst pain score by 1-2 points for improved tolerance for household activities/chores. Appropriate and Ongoing  - Pt able to perform HEP correctly with minimal cueing or supervision from therapist to encourage independent management of symptoms. Appropriate and Ongoing    Long term goals: 10 weeks or 20 visits   - Pt will demonstrate increased lumbar MedX ROM by at least 6 degrees from initial ROM value, resulting in improved ability to perform functional forward bending while standing and sitting. Appropriate and Ongoing  - Pt will demonstrate increased MedX average isometric strength value by 30% from initial test resulting in improved ability to perform bending, lifting, and carrying activities safely and confidently. Appropriate and Ongoing  - Pt to demonstrate ability to independently control and reduce their pain through posture positioning and mechanical movements throughout a typical day. Appropriate and Ongoing  - Pt will demonstrate reduced pain and improved functional outcomes as reported on the FOTO by reaching an intake score of >/= 56% functional ability in order to demonstrate subjective improvement in patient's condition. . Appropriate and Ongoing  - Pt will demonstrate  independence with the HEP at discharge. Appropriate and Ongoing  - Patient will report >/= 50% improvement in pain and function to improve ability to care for special needs son Appropriate and Ongoing    Plan   Continue with established Plan of Care towards established PT goals.     Vanita Anders, PTA  5/9/2024

## 2024-05-09 ENCOUNTER — CLINICAL SUPPORT (OUTPATIENT)
Dept: REHABILITATION | Facility: HOSPITAL | Age: 59
End: 2024-05-09
Payer: MEDICARE

## 2024-05-09 DIAGNOSIS — M53.86 DECREASED RANGE OF MOTION OF LUMBAR SPINE: Primary | ICD-10-CM

## 2024-05-09 PROCEDURE — 97112 NEUROMUSCULAR REEDUCATION: CPT | Mod: PN,CQ

## 2024-05-09 PROCEDURE — 97110 THERAPEUTIC EXERCISES: CPT | Mod: PN,CQ

## 2024-05-09 PROCEDURE — 97530 THERAPEUTIC ACTIVITIES: CPT | Mod: PN,CQ

## 2024-05-15 ENCOUNTER — PATIENT MESSAGE (OUTPATIENT)
Dept: SPINE | Facility: CLINIC | Age: 59
End: 2024-05-15
Payer: MEDICARE

## 2024-05-20 ENCOUNTER — PATIENT MESSAGE (OUTPATIENT)
Dept: ADMINISTRATIVE | Facility: HOSPITAL | Age: 59
End: 2024-05-20
Payer: MEDICARE

## 2024-05-20 ENCOUNTER — PATIENT OUTREACH (OUTPATIENT)
Dept: ADMINISTRATIVE | Facility: HOSPITAL | Age: 59
End: 2024-05-20
Payer: MEDICARE

## 2024-05-20 NOTE — PROGRESS NOTES
Population Health Chart Review & Patient Outreach Details      Additional [facility] Health Notes:               Updates Requested / Reviewed:      Updated Care Coordination Note, , External Sources: [facility] and [facility], and Immunizations Reconciliation Completed or Queried: [place]         Health Maintenance Topics Overdue:      VB Score: 1     Hemoglobin A1c                       Health Maintenance Topic(s) Outreach Outcomes & Actions Taken:    Cervical Cancer Screening - Outreach Outcomes & Actions Taken  : External Records Uploaded & Care Team Updated if Applicable

## 2024-05-24 ENCOUNTER — OFFICE VISIT (OUTPATIENT)
Dept: SPINE | Facility: CLINIC | Age: 59
End: 2024-05-24
Payer: MEDICARE

## 2024-05-24 VITALS — WEIGHT: 192.25 LBS | BODY MASS INDEX: 30.17 KG/M2 | HEIGHT: 67 IN

## 2024-05-24 DIAGNOSIS — M54.9 DORSALGIA, UNSPECIFIED: ICD-10-CM

## 2024-05-24 DIAGNOSIS — G89.29 CHRONIC RIGHT-SIDED LOW BACK PAIN, UNSPECIFIED WHETHER SCIATICA PRESENT: Primary | ICD-10-CM

## 2024-05-24 DIAGNOSIS — M54.50 CHRONIC RIGHT-SIDED LOW BACK PAIN, UNSPECIFIED WHETHER SCIATICA PRESENT: Primary | ICD-10-CM

## 2024-05-24 PROCEDURE — 3008F BODY MASS INDEX DOCD: CPT | Mod: CPTII,S$GLB,, | Performed by: PHYSICAL MEDICINE & REHABILITATION

## 2024-05-24 PROCEDURE — 1160F RVW MEDS BY RX/DR IN RCRD: CPT | Mod: CPTII,S$GLB,, | Performed by: PHYSICAL MEDICINE & REHABILITATION

## 2024-05-24 PROCEDURE — 99213 OFFICE O/P EST LOW 20 MIN: CPT | Mod: S$GLB,,, | Performed by: PHYSICAL MEDICINE & REHABILITATION

## 2024-05-24 PROCEDURE — 1159F MED LIST DOCD IN RCRD: CPT | Mod: CPTII,S$GLB,, | Performed by: PHYSICAL MEDICINE & REHABILITATION

## 2024-05-24 PROCEDURE — 99999 PR PBB SHADOW E&M-EST. PATIENT-LVL III: CPT | Mod: PBBFAC,,, | Performed by: PHYSICAL MEDICINE & REHABILITATION

## 2024-05-24 NOTE — PROGRESS NOTES
SUBJECTIVE:    Patient ID: Gil Cueva is a 59 y.o. female.    Chief Complaint: Follow-up    She presents today with ongoing complaints of right-sided mid to upper lumbar pain sometimes radiates into the right groin.  She went to physical therapy and found no lasting benefit.  She denies any radicular symptoms radiating down the leg.  Bowel and bladder remained intact.  Current pain level is 7/10 and interferes with her quality of life in terms of activities of daily living recreation and social activities.          Past Medical History:   Diagnosis Date    Breast cancer     General anesthetics causing adverse effect in therapeutic use     woke up during ankle surgery    PONV (postoperative nausea and vomiting)      Social History     Socioeconomic History    Marital status:     Number of children: 1   Tobacco Use    Smoking status: Never    Smokeless tobacco: Never   Substance and Sexual Activity    Alcohol use: Yes    Drug use: No    Sexual activity: Not Currently     Social Determinants of Health     Financial Resource Strain: High Risk (1/31/2024)    Overall Financial Resource Strain (CARDIA)     Difficulty of Paying Living Expenses: Very hard   Food Insecurity: Food Insecurity Present (1/31/2024)    Hunger Vital Sign     Worried About Running Out of Food in the Last Year: Sometimes true     Ran Out of Food in the Last Year: Sometimes true   Transportation Needs: No Transportation Needs (1/31/2024)    PRAPARE - Transportation     Lack of Transportation (Medical): No     Lack of Transportation (Non-Medical): No   Physical Activity: Sufficiently Active (1/31/2024)    Exercise Vital Sign     Days of Exercise per Week: 4 days     Minutes of Exercise per Session: 80 min   Stress: Stress Concern Present (1/31/2024)    [employer] [place] [employer] - Occupational Stress Questionnaire     Feeling of Stress : Very much   Housing Stability: Low Risk  (1/31/2024)    Housing Stability Vital Sign     " Unable to Pay for Housing in the Last Year: No     Number of Places Lived in the Last Year: 1     Unstable Housing in the Last Year: No     Past Surgical History:   Procedure Laterality Date    ANKLE SURGERY Right     2016    BREAST LUMPECTOMY Left 2008    CERVICAL FUSION N/A 20003    c-3, 4, 5    KNEE ARTHROSCOPY W/ MENISCECTOMY Right 10/24/2023    Procedure: ARTHROSCOPY, KNEE, WITH MENISCECTOMY;  Surgeon: Nicanor Luna MD;  Location: [facility];  Service: Orthopedics;  Laterality: Right;  lateral meniscectomy    ROTATOR CUFF REPAIR Right 2016    Dr. Perez     Family History   Problem Relation Name Age of Onset    Breast cancer Maternal Aunt      Melanoma Maternal Grandfather       Vitals:    05/24/24 0933   Weight: 87.2 kg (192 lb 3.9 oz)   Height: 5' 7" (1.702 m)       Review of Systems   Constitutional:  Negative for chills, diaphoresis, fatigue, fever and unexpected weight change.   HENT:  Negative for trouble swallowing.    Eyes:  Negative for visual disturbance.   Respiratory:  Negative for shortness of breath.    Cardiovascular:  Negative for chest pain.   Gastrointestinal:  Negative for abdominal pain, constipation, nausea and vomiting.   Genitourinary:  Negative for difficulty urinating.   Musculoskeletal:  Negative for arthralgias, back pain, gait problem, joint swelling, myalgias, neck pain and neck stiffness.   Neurological:  Negative for dizziness, speech difficulty, weakness, light-headedness, numbness and headaches.          Objective:      Physical Exam  Neurological:      Mental Status: She is alert and oriented to person, place, and time.             Assessment:       1. Chronic right-sided low back pain, unspecified whether sciatica present    2. Dorsalgia, unspecified           Plan:     She has not improved to her satisfaction with physical therapy.  Recommending MRI of the lumbar spine in preparation for epidural steroid injection versus radiofrequency ablation.  Follow-up with me " after scan      Chronic right-sided low back pain, unspecified whether sciatica present    Dorsalgia, unspecified  -     MRI Lumbar Spine Without Contrast; Future; Expected date: 05/24/2024

## 2024-05-31 ENCOUNTER — HOSPITAL ENCOUNTER (OUTPATIENT)
Dept: RADIOLOGY | Facility: HOSPITAL | Age: 59
Discharge: HOME | End: 2024-05-31
Attending: PHYSICAL MEDICINE & REHABILITATION
Payer: MEDICARE

## 2024-05-31 DIAGNOSIS — M54.9 DORSALGIA, UNSPECIFIED: ICD-10-CM

## 2024-05-31 PROCEDURE — 72148 MRI LUMBAR SPINE W/O DYE: CPT | Mod: TC

## 2024-05-31 PROCEDURE — 72148 MRI LUMBAR SPINE W/O DYE: CPT | Mod: 26,,, | Performed by: RADIOLOGY

## 2024-06-05 ENCOUNTER — HOSPITAL ENCOUNTER (OUTPATIENT)
Dept: RADIOLOGY | Facility: HOSPITAL | Age: 59
Discharge: HOME | End: 2024-06-05
Attending: PHYSICAL MEDICINE & REHABILITATION
Payer: MEDICARE

## 2024-06-05 ENCOUNTER — OFFICE VISIT (OUTPATIENT)
Dept: SPINE | Facility: CLINIC | Age: 59
End: 2024-06-05
Payer: MEDICARE

## 2024-06-05 VITALS — BODY MASS INDEX: 30.17 KG/M2 | HEIGHT: 67 IN | WEIGHT: 192.25 LBS

## 2024-06-05 DIAGNOSIS — M54.6 PAIN IN THORACIC SPINE: Primary | ICD-10-CM

## 2024-06-05 DIAGNOSIS — M54.6 PAIN IN THORACIC SPINE: ICD-10-CM

## 2024-06-05 PROCEDURE — 3008F BODY MASS INDEX DOCD: CPT | Mod: CPTII,S$GLB,, | Performed by: PHYSICAL MEDICINE & REHABILITATION

## 2024-06-05 PROCEDURE — 99999 PR PBB SHADOW E&M-EST. PATIENT-LVL III: CPT | Mod: PBBFAC,,, | Performed by: PHYSICAL MEDICINE & REHABILITATION

## 2024-06-05 PROCEDURE — 1159F MED LIST DOCD IN RCRD: CPT | Mod: CPTII,S$GLB,, | Performed by: PHYSICAL MEDICINE & REHABILITATION

## 2024-06-05 PROCEDURE — 99212 OFFICE O/P EST SF 10 MIN: CPT | Mod: S$GLB,,, | Performed by: PHYSICAL MEDICINE & REHABILITATION

## 2024-06-05 PROCEDURE — 1160F RVW MEDS BY RX/DR IN RCRD: CPT | Mod: CPTII,S$GLB,, | Performed by: PHYSICAL MEDICINE & REHABILITATION

## 2024-06-05 PROCEDURE — 72070 X-RAY EXAM THORAC SPINE 2VWS: CPT | Mod: TC

## 2024-06-05 PROCEDURE — 72070 X-RAY EXAM THORAC SPINE 2VWS: CPT | Mod: 26,,, | Performed by: RADIOLOGY

## 2024-06-05 NOTE — PROGRESS NOTES
SUBJECTIVE:    Patient ID: Gil Cueva is a 59 y.o. female.    Chief Complaint: Follow-up    She is here to review her lumbar MRI done 05/31/2024 to evaluate her complaint of right-sided mid to upper lumbar pain sometimes radiates into the right groin.      The MRI is summarized below:      FINDINGS:  There is no evidence of lumbar fracture or acute osseous destructive process.  2 mm L4 anterolisthesis is unchanged.  Alignment at all other levels is normal.  Intervertebral disc height is well preserved, with disc desiccation noted at L4-5 and L5-S1.  Signal within the conus medullaris is within normal limits.     T12-L1, L1-2, L2-3: No significant abnormalities.     L3-4: There is minimal broad-based disc bulging, without significant narrowing of the spinal canal or foramina.     L4-5: There is mild bilateral degenerative facet hypertrophy and minimal L4 anterolisthesis.  Minor broad-based disc bulging is also noted, with left paracentral outer annular tear.  There is minor narrowing of the spinal canal, without significant foraminal stenosis.     L5-S1: Mild bilateral degenerative facet hypertrophy and minimal broad-based disc bulging are noted, with left paracentral outer annular tear.  There is no significant narrowing of the spinal canal or foramina.     Impression:     1. Changes of mild multilevel lumbar degenerative disc and facet disease as above, without high-grade spinal stenosis or evidence of direct nerve root impingement.        Clinically she says that the area of her spine image does not where she has having the maximum pain.  He is actually hurting more along the area of the bra line on the right side.  Pain level is 8/10 and interferes with quality of life in terms of activities of daily living recreation and social activities.  Otherwise has no new or progressive problems            Past Medical History:   Diagnosis Date    Breast cancer     General anesthetics causing adverse effect in  therapeutic use     woke up during ankle surgery    PONV (postoperative nausea and vomiting)      Social History     Socioeconomic History    Marital status:     Number of children: 1   Tobacco Use    Smoking status: Never    Smokeless tobacco: Never   Substance and Sexual Activity    Alcohol use: Yes    Drug use: No    Sexual activity: Not Currently     Social Determinants of Health     Financial Resource Strain: High Risk (1/31/2024)    Overall Financial Resource Strain (CARDIA)     Difficulty of Paying Living Expenses: Very hard   Food Insecurity: Food Insecurity Present (1/31/2024)    Hunger Vital Sign     Worried About Running Out of Food in the Last Year: Sometimes true     Ran Out of Food in the Last Year: Sometimes true   Transportation Needs: No Transportation Needs (1/31/2024)    PRAPARE - Transportation     Lack of Transportation (Medical): No     Lack of Transportation (Non-Medical): No   Physical Activity: Sufficiently Active (1/31/2024)    Exercise Vital Sign     Days of Exercise per Week: 4 days     Minutes of Exercise per Session: 80 min   Stress: Stress Concern Present (1/31/2024)    [employer] [place] [employer] - Occupational Stress Questionnaire     Feeling of Stress : Very much   Housing Stability: Low Risk  (1/31/2024)    Housing Stability Vital Sign     Unable to Pay for Housing in the Last Year: No     Number of Places Lived in the Last Year: 1     Unstable Housing in the Last Year: No     Past Surgical History:   Procedure Laterality Date    ANKLE SURGERY Right     2016    BREAST LUMPECTOMY Left 2008    CERVICAL FUSION N/A 20003    c-3, 4, 5    KNEE ARTHROSCOPY W/ MENISCECTOMY Right 10/24/2023    Procedure: ARTHROSCOPY, KNEE, WITH MENISCECTOMY;  Surgeon: Nicanor Luna MD;  Location: [facility];  Service: Orthopedics;  Laterality: Right;  lateral meniscectomy    ROTATOR CUFF REPAIR Right 2016    Dr. Perez     Family History   Problem Relation Name Age of Onset     "Breast cancer Maternal Aunt      Melanoma Maternal Grandfather       Vitals:    06/05/24 1122   Weight: 87.2 kg (192 lb 3.9 oz)   Height: 5' 7" (1.702 m)       Review of Systems   Constitutional:  Negative for chills, diaphoresis, fatigue, fever and unexpected weight change.   HENT:  Negative for trouble swallowing.    Eyes:  Negative for visual disturbance.   Respiratory:  Negative for shortness of breath.    Cardiovascular:  Negative for chest pain.   Gastrointestinal:  Negative for abdominal pain, constipation, nausea and vomiting.   Genitourinary:  Negative for difficulty urinating.   Musculoskeletal:  Negative for arthralgias, back pain, gait problem, joint swelling, myalgias, neck pain and neck stiffness.   Neurological:  Negative for dizziness, speech difficulty, weakness, light-headedness, numbness and headaches.          Objective:      Physical Exam  Neurological:      Mental Status: She is alert and oriented to person, place, and time.      Comments: She is awake and in no acute distress  Mild tenderness to palpation right midthoracic paraspinous musculature with no palpable masses             Assessment:       1. Pain in thoracic spine           Plan:     I reassured her there are no worrisome findings on her lumbar MRI.  Having said that her chief complaint is mid to lower thoracic pain on the right.  I recommend additional imaging.  We are going to get some x-rays of the thoracic spine done with a marker to better localize her pain and MRI of the thoracic spine.  Follow up with me after the scan      Pain in thoracic spine  -     X-Ray Thoracic Spine AP Lateral; Future; Expected date: 06/05/2024  -     MRI Thoracic Spine Without Contrast; Future; Expected date: 06/05/2024          "

## 2024-06-12 ENCOUNTER — HOSPITAL ENCOUNTER (OUTPATIENT)
Dept: RADIOLOGY | Facility: HOSPITAL | Age: 59
Discharge: HOME | End: 2024-06-12
Attending: PHYSICAL MEDICINE & REHABILITATION
Payer: MEDICARE

## 2024-06-12 DIAGNOSIS — M54.6 PAIN IN THORACIC SPINE: ICD-10-CM

## 2024-06-12 PROCEDURE — 72146 MRI CHEST SPINE W/O DYE: CPT | Mod: 26,,, | Performed by: RADIOLOGY

## 2024-06-12 PROCEDURE — 72146 MRI CHEST SPINE W/O DYE: CPT | Mod: TC

## 2024-06-20 ENCOUNTER — PATIENT MESSAGE (OUTPATIENT)
Dept: SPINE | Facility: CLINIC | Age: 59
End: 2024-06-20
Payer: MEDICARE

## 2024-06-26 ENCOUNTER — OFFICE VISIT (OUTPATIENT)
Dept: SPINE | Facility: CLINIC | Age: 59
End: 2024-06-26
Payer: MEDICARE

## 2024-06-26 VITALS — WEIGHT: 192.25 LBS | HEIGHT: 67 IN | BODY MASS INDEX: 30.17 KG/M2

## 2024-06-26 DIAGNOSIS — G89.29 CHRONIC RIGHT-SIDED LOW BACK PAIN WITH RIGHT-SIDED SCIATICA: ICD-10-CM

## 2024-06-26 DIAGNOSIS — M54.41 CHRONIC RIGHT-SIDED LOW BACK PAIN WITH RIGHT-SIDED SCIATICA: ICD-10-CM

## 2024-06-26 DIAGNOSIS — M54.6 PAIN IN THORACIC SPINE: Primary | ICD-10-CM

## 2024-06-26 PROCEDURE — 99213 OFFICE O/P EST LOW 20 MIN: CPT | Mod: S$GLB,,, | Performed by: PHYSICAL MEDICINE & REHABILITATION

## 2024-06-26 PROCEDURE — 3008F BODY MASS INDEX DOCD: CPT | Mod: CPTII,S$GLB,, | Performed by: PHYSICAL MEDICINE & REHABILITATION

## 2024-06-26 PROCEDURE — 99999 PR PBB SHADOW E&M-EST. PATIENT-LVL III: CPT | Mod: PBBFAC,,, | Performed by: PHYSICAL MEDICINE & REHABILITATION

## 2024-06-26 PROCEDURE — 1159F MED LIST DOCD IN RCRD: CPT | Mod: CPTII,S$GLB,, | Performed by: PHYSICAL MEDICINE & REHABILITATION

## 2024-06-26 PROCEDURE — 1160F RVW MEDS BY RX/DR IN RCRD: CPT | Mod: CPTII,S$GLB,, | Performed by: PHYSICAL MEDICINE & REHABILITATION

## 2024-06-26 NOTE — PROGRESS NOTES
SUBJECTIVE:    Patient ID: Gil Cueva is a 59 y.o. female.    Chief Complaint: Follow-up    She is here to review her thoracic MRI done 06/12/2024 to evaluate her complaint of right mid to lower thoracic pain.  The MRI is summarized below:    FINDINGS:  Alignment: Thoracic vertebral bodies maintain normal alignment without evidence of abnormal subluxation.  Mild straightening of the midthoracic kyphosis.     Vertebrae: No evidence of an acute fracture or aggressive marrow placement process. Small ovoid T1/T2 hyperintensities in the T6 and T9 vertebral bodies suggestive of incidental hemangiomas.  Susceptibility artifact compatible with ACDF hardware at C5-6 seen on  view.     Cord: Normal morphology and signal.     Degenerative findings: Mild multilevel disc degeneration with disc desiccation, intervertebral disc space narrowing, degenerative endplate change with Schmorl's node formation and mild disc bulges most pronounced at T6-7 through T11-12.  No focal disc herniation.  Multilevel facet arthropathy, most pronounced in the lower thoracic spine.  No spinal canal stenosis or neural foraminal narrowing at any level.     Paraspinal muscles & soft tissues: Unremarkable.     Impression:     1. No acute fracture or malalignment.  2. Multilevel mild degenerative changes of the thoracic spine without evidence of significant neural foraminal or spinal canal stenosis at any thoracic level.      Clinically she is about the same.  Symptoms are described above.  Pain level is 9/10 and interferes with quality of life in terms of activities of daily living recreation and social activities.          Past Medical History:   Diagnosis Date    Breast cancer     General anesthetics causing adverse effect in therapeutic use     woke up during ankle surgery    PONV (postoperative nausea and vomiting)      Social History     Socioeconomic History    Marital status:     Number of children: 1   Tobacco Use    Smoking  "status: Never    Smokeless tobacco: Never   Substance and Sexual Activity    Alcohol use: Yes    Drug use: No    Sexual activity: Not Currently     Social Determinants of Health     Financial Resource Strain: High Risk (1/31/2024)    Overall Financial Resource Strain (CARDIA)     Difficulty of Paying Living Expenses: Very hard   Food Insecurity: Food Insecurity Present (1/31/2024)    Hunger Vital Sign     Worried About Running Out of Food in the Last Year: Sometimes true     Ran Out of Food in the Last Year: Sometimes true   Transportation Needs: No Transportation Needs (1/31/2024)    PRAPARE - Transportation     Lack of Transportation (Medical): No     Lack of Transportation (Non-Medical): No   Physical Activity: Sufficiently Active (1/31/2024)    Exercise Vital Sign     Days of Exercise per Week: 4 days     Minutes of Exercise per Session: 80 min   Stress: Stress Concern Present (1/31/2024)    [facility] [employer]ational [facility] - Occupational Stress Questionnaire     Feeling of Stress : Very much   Housing Stability: Low Risk  (1/31/2024)    Housing Stability Vital Sign     Unable to Pay for Housing in the Last Year: No     Number of Places Lived in the Last Year: 1     Unstable Housing in the Last Year: No     Past Surgical History:   Procedure Laterality Date    ANKLE SURGERY Right     2016    BREAST LUMPECTOMY Left 2008    CERVICAL FUSION N/A 20003    c-3, 4, 5    KNEE ARTHROSCOPY W/ MENISCECTOMY Right 10/24/2023    Procedure: ARTHROSCOPY, KNEE, WITH MENISCECTOMY;  Surgeon: Nicanor Luna MD;  Location: [facility];  Service: Orthopedics;  Laterality: Right;  lateral meniscectomy    ROTATOR CUFF REPAIR Right 2016    Dr. Perez     Family History   Problem Relation Name Age of Onset    Breast cancer Maternal Aunt      Melanoma Maternal Grandfather       Vitals:    06/26/24 1059   Weight: 87.2 kg (192 lb 3.9 oz)   Height: 5' 7" (1.702 m)       Review of Systems   Constitutional:  Negative for chills, " diaphoresis, fatigue, fever and unexpected weight change.   HENT:  Negative for trouble swallowing.    Eyes:  Negative for visual disturbance.   Respiratory:  Negative for shortness of breath.    Cardiovascular:  Negative for chest pain.   Gastrointestinal:  Negative for abdominal pain, constipation, nausea and vomiting.   Genitourinary:  Negative for difficulty urinating.   Musculoskeletal:  Negative for arthralgias, back pain, gait problem, joint swelling, myalgias, neck pain and neck stiffness.   Neurological:  Negative for dizziness, speech difficulty, weakness, light-headedness, numbness and headaches.          Objective:      Physical Exam  Neurological:      Mental Status: She is alert and oriented to person, place, and time.      Comments: She has point tenderness to palpation right mid to lower thoracic paraspinous musculature with no palpable mass             Assessment:       1. Pain in thoracic spine           Plan:     I reassured her there are no worrisome findings on her MRI.  Nothing that accounts for the discomfort she is having.  I believe this is likely muscular.  I am going to refer her to general physical medicine to consider trigger point injections.  She can follow up with me on an as needed basis      Pain in thoracic spine  -     Ambulatory referral/consult to Physical Medicine Rehab; Future; Expected date: 07/03/2024

## 2024-07-02 ENCOUNTER — OFFICE VISIT (OUTPATIENT)
Dept: PHYSICAL MEDICINE AND REHAB | Facility: CLINIC | Age: 59
End: 2024-07-02
Payer: MEDICARE

## 2024-07-02 VITALS
DIASTOLIC BLOOD PRESSURE: 89 MMHG | WEIGHT: 192 LBS | HEIGHT: 67 IN | HEART RATE: 85 BPM | BODY MASS INDEX: 30.13 KG/M2 | SYSTOLIC BLOOD PRESSURE: 152 MMHG

## 2024-07-02 DIAGNOSIS — M79.18 MYOFASCIAL PAIN SYNDROME OF THORACIC SPINE: Primary | ICD-10-CM

## 2024-07-02 DIAGNOSIS — M54.6 PAIN IN THORACIC SPINE: ICD-10-CM

## 2024-07-02 PROCEDURE — 99999 PR PBB SHADOW E&M-EST. PATIENT-LVL III: CPT | Mod: PBBFAC,,, | Performed by: STUDENT IN AN ORGANIZED HEALTH CARE EDUCATION/TRAINING PROGRAM

## 2024-07-02 RX ORDER — METHYLPREDNISOLONE ACETATE 40 MG/ML
40 INJECTION, SUSPENSION INTRA-ARTICULAR; INTRALESIONAL; INTRAMUSCULAR; SOFT TISSUE
Status: DISCONTINUED | OUTPATIENT
Start: 2024-07-02 | End: 2024-07-02 | Stop reason: HOSPADM

## 2024-07-02 RX ADMIN — METHYLPREDNISOLONE ACETATE 40 MG: 40 INJECTION, SUSPENSION INTRA-ARTICULAR; INTRALESIONAL; INTRAMUSCULAR; SOFT TISSUE at 09:07

## 2024-07-02 NOTE — ASSESSMENT & PLAN NOTE
Reviewed imaging including thoracic spine MRI and lumbar spine MRI.  Neither were very remarkable for a cause.  Likely myofascial in nature.  Trigger point injection x3 to the right thoracic paraspinal musculature with 40 mg of Depo-Medrol and 4 cc of 1% lidocaine.  See procedure note for details   Return to clinic in 6-8 weeks.  Consider repeat trigger point injection without steroids at that time as indicated.

## 2024-07-02 NOTE — PROCEDURES
Trigger Point Injection    Performed by: Ashish Helms MD  Authorized by: Ashish Helms MD    Thoracic Paraspinal:  Right    Consent Done?:  Yes (Verbal)    Pre-Procedure:   Indications:  Diagnostic evaluation, pain and pain relief      Local anesthesia used?: No    Local anesthetic:  Lidocaine 1% without epinephrine  Anesthetic total (ml):  4    Medications: 40 mg methylPREDNISolone acetate 40 mg/mL

## 2024-07-02 NOTE — PROGRESS NOTES
PHYSICAL MEDICINE AND REHABILITATION  New Patient Consult:    Subjective:   Chief Complaint:    Thoracic back pain    HPI: Gil Cueva is a 59 y.o. female with  has a past medical history of Breast cancer, General anesthetics causing adverse effect in therapeutic use, and PONV (postoperative nausea and vomiting). She was sent to me for consultation for No chief complaint on file.  Today,  she complains of chronic right-sided mid to upper lumbar pain.  The pain will sometimes radiate into the right groin.  She has been to physical therapy and found no lasting benefit.  No radicular complaints into the leg.  No issues with bladder or bowel.  Her pain today is 7/10.  She has been to physical therapy without sustained relief.    Review of Systems  Denies    Imaging/Diagnostic Studies   The MRI is summarized below:     FINDINGS:  Alignment: Thoracic vertebral bodies maintain normal alignment without evidence of abnormal subluxation.  Mild straightening of the midthoracic kyphosis.     Vertebrae: No evidence of an acute fracture or aggressive marrow placement process. Small ovoid T1/T2 hyperintensities in the T6 and T9 vertebral bodies suggestive of incidental hemangiomas.  Susceptibility artifact compatible with ACDF hardware at C5-6 seen on  view.     Cord: Normal morphology and signal.     Degenerative findings: Mild multilevel disc degeneration with disc desiccation, intervertebral disc space narrowing, degenerative endplate change with Schmorl's node formation and mild disc bulges most pronounced at T6-7 through T11-12.  No focal disc herniation.  Multilevel facet arthropathy, most pronounced in the lower thoracic spine.  No spinal canal stenosis or neural foraminal narrowing at any level.     Paraspinal muscles & soft tissues: Unremarkable.     Impression:     1. No acute fracture or malalignment.  2. Multilevel mild degenerative changes of the thoracic spine without evidence of significant neural  foraminal or spinal canal stenosis at any thoracic level.      The MRI lumbar spine is summarized below:     FINDINGS:  There is no evidence of lumbar fracture or acute osseous destructive process.  2 mm L4 anterolisthesis is unchanged.  Alignment at all other levels is normal.  Intervertebral disc height is well preserved, with disc desiccation noted at L4-5 and L5-S1.  Signal within the conus medullaris is within normal limits.     T12-L1, L1-2, L2-3: No significant abnormalities.     L3-4: There is minimal broad-based disc bulging, without significant narrowing of the spinal canal or foramina.     L4-5: There is mild bilateral degenerative facet hypertrophy and minimal L4 anterolisthesis.  Minor broad-based disc bulging is also noted, with left paracentral outer annular tear.  There is minor narrowing of the spinal canal, without significant foraminal stenosis.     L5-S1: Mild bilateral degenerative facet hypertrophy and minimal broad-based disc bulging are noted, with left paracentral outer annular tear.  There is no significant narrowing of the spinal canal or foramina.     Impression:     1. Changes of mild multilevel lumbar degenerative disc and facet disease as above, without high-grade spinal stenosis or evidence of direct nerve root impingement.        Past Medical History:   Diagnosis Date    Breast cancer     General anesthetics causing adverse effect in therapeutic use     woke up during ankle surgery    PONV (postoperative nausea and vomiting)        Past Surgical History:   Procedure Laterality Date    ANKLE SURGERY Right     2016    BREAST LUMPECTOMY Left 2008    CERVICAL FUSION N/A 20003    c-3, 4, 5    KNEE ARTHROSCOPY W/ MENISCECTOMY Right 10/24/2023    Procedure: ARTHROSCOPY, KNEE, WITH MENISCECTOMY;  Surgeon: Nicanor Luna MD;  Location: [facility];  Service: Orthopedics;  Laterality: Right;  lateral meniscectomy    ROTATOR CUFF REPAIR Right 2016    Dr. Perez       Review of patient's  allergies indicates:   Allergen Reactions    Penicillins      I have no idea, my mother told me that  My mom said they gave it to me as a baby and it almost killed me    Sulfa (sulfonamide antibiotics)      I have no idea, my mother told me that    My mom said she is allergic to it so I am allergic to it       Current Outpatient Medications   Medication Sig Dispense Refill    aspirin-acetaminophen-caffeine 250-250-65 mg (EXCEDRIN MIGRAINE) 250-250-65 mg per tablet Take 1 tablet by mouth every 6 (six) hours as needed for Pain.      ibuprofen (ADVIL,MOTRIN) 200 MG tablet Take 200 mg by mouth every 6 (six) hours as needed for Pain.      meloxicam (MOBIC) 15 MG tablet Take 15 mg by mouth once daily.      methocarbamoL (ROBAXIN) 500 MG Tab Take 1-2 tablets 3 times a day as needed for back pain/spasms 60 tablet 0    terbinafine HCL (LAMISIL) 250 mg tablet Take 250 mg by mouth once daily.       Current Facility-Administered Medications   Medication Dose Route Frequency Provider Last Rate Last Admin    cyanocobalamin injection 1,000 mcg  1,000 mcg Subcutaneous Q30 Days Jona Pretty MD   1,000 mcg at 02/17/20 1025     Facility-Administered Medications Ordered in Other Visits   Medication Dose Route Frequency Provider Last Rate Last Admin    electrolyte-S (ISOLYTE)   Intravenous Continuous Rayray Alejo MD 70 mL/hr at 10/24/23 0822 New Bag at 10/24/23 0822    electrolyte-S (ISOLYTE)   Intravenous Continuous Rayray Alejo MD 10 mL/hr at 10/24/23 0619 New Bag at 10/24/23 0619    LIDOcaine (PF) 10 mg/ml (1%) injection 10 mg  1 mL Intradermal Once Rayray Alejo MD        ondansetron injection 4 mg  4 mg Intravenous Once PRN Rayray Alejo MD           Family History   Problem Relation Name Age of Onset    Breast cancer Maternal Aunt      Melanoma Maternal Grandfather         Social History     Socioeconomic History    Marital status:     Number of children: 1   Tobacco Use    Smoking status:  "Never    Smokeless tobacco: Never   Substance and Sexual Activity    Alcohol use: Yes    Drug use: No    Sexual activity: Not Currently     Social Determinants of Health     Financial Resource Strain: High Risk (1/31/2024)    Overall Financial Resource Strain (CARDIA)     Difficulty of Paying Living Expenses: Very hard   Food Insecurity: Food Insecurity Present (1/31/2024)    Hunger Vital Sign     Worried About Running Out of Food in the Last Year: Sometimes true     Ran Out of Food in the Last Year: Sometimes true   Transportation Needs: No Transportation Needs (1/31/2024)    PRAPARE - Transportation     Lack of Transportation (Medical): No     Lack of Transportation (Non-Medical): No   Physical Activity: Sufficiently Active (1/31/2024)    Exercise Vital Sign     Days of Exercise per Week: 4 days     Minutes of Exercise per Session: 80 min   Stress: Stress Concern Present (1/31/2024)    [employer] [employer] - Occupational Stress Questionnaire     Feeling of Stress : Very much   Housing Stability: Low Risk  (1/31/2024)    Housing Stability Vital Sign     Unable to Pay for Housing in the Last Year: No     Number of Places Lived in the Last Year: 1     Unstable Housing in the Last Year: No         Objective:    BP (!) 152/89 (BP Location: Right arm, Patient Position: Sitting, BP Method: Large (Automatic))   Pulse 85   Ht 5' 7" (1.702 m)   Wt 87.1 kg (192 lb)   BMI 30.07 kg/m²   Physical Exam  Vitals and nursing note reviewed.   Constitutional:       Appearance: Normal appearance.   HENT:      Head: Normocephalic.   Eyes:      Extraocular Movements: Extraocular movements intact.   Cardiovascular:      Rate and Rhythm: Normal rate.      Pulses: Normal pulses.   Pulmonary:      Effort: Pulmonary effort is normal.   Abdominal:      General: Abdomen is flat.   Skin:     General: Skin is warm and dry.   Neurological:      General: No focal deficit present.      Mental Status: She is alert and " oriented to person, place, and time. Mental status is at baseline.   Psychiatric:         Mood and Affect: Mood normal.         Behavior: Behavior normal.         Thought Content: Thought content normal.        Back Exam     Tenderness   The patient is experiencing tenderness in the thoracic.    Range of Motion   Extension:  normal   Flexion:  normal   Lateral bend right:  normal   Lateral bend left:  normal   Rotation right:  normal   Rotation left:  normal     Other   Sensation: normal  Gait: normal   Erythema: no back redness  Scars: absent               Assessment:       ICD-10-CM ICD-9-CM    1. Myofascial pain syndrome of thoracic spine  M79.18 729.1 Trigger Point Injection      2. Pain in thoracic spine  M54.6 724.1 Ambulatory referral/consult to Physical Medicine Rehab            Plan:   1. Myofascial pain syndrome of thoracic spine  Assessment & Plan:  Reviewed imaging including thoracic spine MRI and lumbar spine MRI.  Neither were very remarkable for a cause.  Likely myofascial in nature.  Trigger point injection x3 to the right thoracic paraspinal musculature with 40 mg of Depo-Medrol and 4 cc of 1% lidocaine.  See procedure note for details   Return to clinic in 6-8 weeks.  Consider repeat trigger point injection without steroids at that time as indicated.      Orders:  -     Trigger Point Injection    2. Pain in thoracic spine  -     Ambulatory referral/consult to Physical Medicine Rehab           Ashish Helms MD  Physical Medicine & Rehabilitation     Disclaimer:  This note may have been prepared using voice recognition software, it may have not been extensively proofed, as such there could be errors within the text such as sound alike errors.  Contact the author of this note for clarification.

## 2024-07-25 ENCOUNTER — PATIENT MESSAGE (OUTPATIENT)
Dept: DERMATOLOGY | Facility: CLINIC | Age: 59
End: 2024-07-25
Payer: MEDICARE

## 2024-07-26 ENCOUNTER — DOCUMENTATION ONLY (OUTPATIENT)
Dept: REHABILITATION | Facility: HOSPITAL | Age: 59
End: 2024-07-26
Payer: MEDICARE

## 2024-07-26 PROBLEM — M53.86 DECREASED RANGE OF MOTION OF LUMBAR SPINE: Status: RESOLVED | Noted: 2024-04-03 | Resolved: 2024-07-26

## 2024-07-26 NOTE — PROGRESS NOTES
Patient is being Discharged from skilled PT at this time due to self DC from PT.  Attended 7 visits.  Last visit on 05-

## 2024-08-07 ENCOUNTER — LAB VISIT (OUTPATIENT)
Dept: LAB | Facility: HOSPITAL | Age: 59
End: 2024-08-07
Attending: FAMILY MEDICINE
Payer: MEDICARE

## 2024-08-07 DIAGNOSIS — E78.5 HYPERLIPIDEMIA, UNSPECIFIED HYPERLIPIDEMIA TYPE: ICD-10-CM

## 2024-08-07 DIAGNOSIS — Z13.1 DIABETES MELLITUS SCREENING: ICD-10-CM

## 2024-08-07 DIAGNOSIS — R79.9 ABNORMAL FINDING OF BLOOD CHEMISTRY, UNSPECIFIED: ICD-10-CM

## 2024-08-07 LAB
ALBUMIN SERPL BCP-MCNC: 3.9 G/DL (ref 3.5–5.2)
ALP SERPL-CCNC: 55 U/L (ref 55–135)
ALT SERPL W/O P-5'-P-CCNC: 14 U/L (ref 10–44)
ANION GAP SERPL CALC-SCNC: 8 MMOL/L (ref 8–16)
AST SERPL-CCNC: 15 U/L (ref 10–40)
BASOPHILS # BLD AUTO: 0.04 K/UL (ref 0–0.2)
BASOPHILS NFR BLD: 0.9 % (ref 0–1.9)
BILIRUB SERPL-MCNC: 0.4 MG/DL (ref 0.1–1)
BUN SERPL-MCNC: 17 MG/DL (ref 6–20)
CALCIUM SERPL-MCNC: 9.5 MG/DL (ref 8.7–10.5)
CHLORIDE SERPL-SCNC: 105 MMOL/L (ref 95–110)
CHOLEST SERPL-MCNC: 235 MG/DL (ref 120–199)
CHOLEST/HDLC SERPL: 3.5 {RATIO} (ref 2–5)
CO2 SERPL-SCNC: 24 MMOL/L (ref 23–29)
CREAT SERPL-MCNC: 0.9 MG/DL (ref 0.5–1.4)
DIFFERENTIAL METHOD BLD: ABNORMAL
EOSINOPHIL # BLD AUTO: 0 K/UL (ref 0–0.5)
EOSINOPHIL NFR BLD: 0.7 % (ref 0–8)
ERYTHROCYTE [DISTWIDTH] IN BLOOD BY AUTOMATED COUNT: 12.7 % (ref 11.5–14.5)
EST. GFR  (NO RACE VARIABLE): >60 ML/MIN/1.73 M^2
ESTIMATED AVG GLUCOSE: 97 MG/DL (ref 68–131)
GLUCOSE SERPL-MCNC: 86 MG/DL (ref 70–110)
HBA1C MFR BLD: 5 % (ref 4–5.6)
HCT VFR BLD AUTO: 42.6 % (ref 37–48.5)
HDLC SERPL-MCNC: 68 MG/DL (ref 40–75)
HDLC SERPL: 28.9 % (ref 20–50)
HGB BLD-MCNC: 13.1 G/DL (ref 12–16)
IMM GRANULOCYTES # BLD AUTO: 0.01 K/UL (ref 0–0.04)
IMM GRANULOCYTES NFR BLD AUTO: 0.2 % (ref 0–0.5)
LDLC SERPL CALC-MCNC: 155.8 MG/DL (ref 63–159)
LYMPHOCYTES # BLD AUTO: 2 K/UL (ref 1–4.8)
LYMPHOCYTES NFR BLD: 45.3 % (ref 18–48)
MCH RBC QN AUTO: 30.8 PG (ref 27–31)
MCHC RBC AUTO-ENTMCNC: 30.8 G/DL (ref 32–36)
MCV RBC AUTO: 100 FL (ref 82–98)
MONOCYTES # BLD AUTO: 0.4 K/UL (ref 0.3–1)
MONOCYTES NFR BLD: 10 % (ref 4–15)
NEUTROPHILS # BLD AUTO: 1.9 K/UL (ref 1.8–7.7)
NEUTROPHILS NFR BLD: 42.9 % (ref 38–73)
NONHDLC SERPL-MCNC: 167 MG/DL
NRBC BLD-RTO: 0 /100 WBC
PLATELET # BLD AUTO: 375 K/UL (ref 150–450)
PMV BLD AUTO: 10.3 FL (ref 9.2–12.9)
POTASSIUM SERPL-SCNC: 4.9 MMOL/L (ref 3.5–5.1)
PROT SERPL-MCNC: 7 G/DL (ref 6–8.4)
RBC # BLD AUTO: 4.26 M/UL (ref 4–5.4)
SODIUM SERPL-SCNC: 137 MMOL/L (ref 136–145)
TRIGL SERPL-MCNC: 56 MG/DL (ref 30–150)
WBC # BLD AUTO: 4.39 K/UL (ref 3.9–12.7)

## 2024-08-07 PROCEDURE — 36415 COLL VENOUS BLD VENIPUNCTURE: CPT | Mod: PO | Performed by: FAMILY MEDICINE

## 2024-08-07 PROCEDURE — 85025 COMPLETE CBC W/AUTO DIFF WBC: CPT | Performed by: FAMILY MEDICINE

## 2024-08-07 PROCEDURE — 80053 COMPREHEN METABOLIC PANEL: CPT | Performed by: FAMILY MEDICINE

## 2024-08-07 PROCEDURE — 83036 HEMOGLOBIN GLYCOSYLATED A1C: CPT | Performed by: FAMILY MEDICINE

## 2024-08-07 PROCEDURE — 80061 LIPID PANEL: CPT | Performed by: FAMILY MEDICINE

## 2024-08-08 ENCOUNTER — OFFICE VISIT (OUTPATIENT)
Dept: FAMILY MEDICINE | Facility: CLINIC | Age: 59
End: 2024-08-08
Payer: MEDICARE

## 2024-08-08 VITALS
SYSTOLIC BLOOD PRESSURE: 120 MMHG | BODY MASS INDEX: 31.15 KG/M2 | RESPIRATION RATE: 17 BRPM | OXYGEN SATURATION: 97 % | DIASTOLIC BLOOD PRESSURE: 80 MMHG | HEIGHT: 67 IN | WEIGHT: 198.44 LBS | HEART RATE: 80 BPM | TEMPERATURE: 98 F

## 2024-08-08 DIAGNOSIS — M54.14 THORACIC AND LUMBOSACRAL NEURITIS: ICD-10-CM

## 2024-08-08 DIAGNOSIS — M79.18 MYOFASCIAL PAIN SYNDROME OF THORACIC SPINE: Primary | ICD-10-CM

## 2024-08-08 DIAGNOSIS — M54.17 THORACIC AND LUMBOSACRAL NEURITIS: ICD-10-CM

## 2024-08-08 DIAGNOSIS — I70.0 AORTIC ATHEROSCLEROSIS: ICD-10-CM

## 2024-08-08 DIAGNOSIS — M54.6 PAIN IN THORACIC SPINE: ICD-10-CM

## 2024-08-08 DIAGNOSIS — E66.9 OBESITY (BMI 30.0-34.9): ICD-10-CM

## 2024-08-08 DIAGNOSIS — Z85.3 HISTORY OF BREAST CANCER: ICD-10-CM

## 2024-08-08 DIAGNOSIS — E78.5 HYPERLIPIDEMIA, UNSPECIFIED HYPERLIPIDEMIA TYPE: ICD-10-CM

## 2024-08-08 DIAGNOSIS — F32.1 MAJOR DEPRESSIVE DISORDER, SINGLE EPISODE, MODERATE: ICD-10-CM

## 2024-08-08 PROCEDURE — 1160F RVW MEDS BY RX/DR IN RCRD: CPT | Mod: CPTII,S$GLB,, | Performed by: FAMILY MEDICINE

## 2024-08-08 PROCEDURE — 99214 OFFICE O/P EST MOD 30 MIN: CPT | Mod: S$GLB,,, | Performed by: FAMILY MEDICINE

## 2024-08-08 PROCEDURE — 99999 PR PBB SHADOW E&M-EST. PATIENT-LVL IV: CPT | Mod: PBBFAC,,, | Performed by: FAMILY MEDICINE

## 2024-08-08 PROCEDURE — 3074F SYST BP LT 130 MM HG: CPT | Mod: CPTII,S$GLB,, | Performed by: FAMILY MEDICINE

## 2024-08-08 PROCEDURE — 3079F DIAST BP 80-89 MM HG: CPT | Mod: CPTII,S$GLB,, | Performed by: FAMILY MEDICINE

## 2024-08-08 PROCEDURE — 3008F BODY MASS INDEX DOCD: CPT | Mod: CPTII,S$GLB,, | Performed by: FAMILY MEDICINE

## 2024-08-08 PROCEDURE — 3044F HG A1C LEVEL LT 7.0%: CPT | Mod: CPTII,S$GLB,, | Performed by: FAMILY MEDICINE

## 2024-08-08 PROCEDURE — 1159F MED LIST DOCD IN RCRD: CPT | Mod: CPTII,S$GLB,, | Performed by: FAMILY MEDICINE

## 2024-08-08 PROCEDURE — G2211 COMPLEX E/M VISIT ADD ON: HCPCS | Mod: S$GLB,,, | Performed by: FAMILY MEDICINE

## 2024-08-08 RX ORDER — CYCLOBENZAPRINE HCL 5 MG
5 TABLET ORAL 3 TIMES DAILY PRN
Qty: 30 TABLET | Status: SHIPPED | OUTPATIENT
Start: 2024-08-08 | End: 2024-08-18

## 2024-08-13 ENCOUNTER — OFFICE VISIT (OUTPATIENT)
Dept: PHYSICAL MEDICINE AND REHAB | Facility: CLINIC | Age: 59
End: 2024-08-13
Payer: MEDICARE

## 2024-08-13 VITALS
DIASTOLIC BLOOD PRESSURE: 82 MMHG | HEIGHT: 67 IN | HEART RATE: 89 BPM | SYSTOLIC BLOOD PRESSURE: 126 MMHG | BODY MASS INDEX: 30.13 KG/M2 | WEIGHT: 192 LBS

## 2024-08-13 DIAGNOSIS — M79.18 MYOFASCIAL PAIN SYNDROME OF THORACIC SPINE: Primary | ICD-10-CM

## 2024-08-13 PROCEDURE — 3079F DIAST BP 80-89 MM HG: CPT | Mod: CPTII,S$GLB,, | Performed by: STUDENT IN AN ORGANIZED HEALTH CARE EDUCATION/TRAINING PROGRAM

## 2024-08-13 PROCEDURE — 99999 PR PBB SHADOW E&M-EST. PATIENT-LVL III: CPT | Mod: PBBFAC,,, | Performed by: STUDENT IN AN ORGANIZED HEALTH CARE EDUCATION/TRAINING PROGRAM

## 2024-08-13 PROCEDURE — 3044F HG A1C LEVEL LT 7.0%: CPT | Mod: CPTII,S$GLB,, | Performed by: STUDENT IN AN ORGANIZED HEALTH CARE EDUCATION/TRAINING PROGRAM

## 2024-08-13 PROCEDURE — 3074F SYST BP LT 130 MM HG: CPT | Mod: CPTII,S$GLB,, | Performed by: STUDENT IN AN ORGANIZED HEALTH CARE EDUCATION/TRAINING PROGRAM

## 2024-08-13 PROCEDURE — 20553 NJX 1/MLT TRIGGER POINTS 3/>: CPT | Mod: S$GLB,,, | Performed by: STUDENT IN AN ORGANIZED HEALTH CARE EDUCATION/TRAINING PROGRAM

## 2024-08-13 PROCEDURE — 3008F BODY MASS INDEX DOCD: CPT | Mod: CPTII,S$GLB,, | Performed by: STUDENT IN AN ORGANIZED HEALTH CARE EDUCATION/TRAINING PROGRAM

## 2024-08-13 PROCEDURE — 1159F MED LIST DOCD IN RCRD: CPT | Mod: CPTII,S$GLB,, | Performed by: STUDENT IN AN ORGANIZED HEALTH CARE EDUCATION/TRAINING PROGRAM

## 2024-08-13 PROCEDURE — 99214 OFFICE O/P EST MOD 30 MIN: CPT | Mod: 25,S$GLB,, | Performed by: STUDENT IN AN ORGANIZED HEALTH CARE EDUCATION/TRAINING PROGRAM

## 2024-08-13 NOTE — ASSESSMENT & PLAN NOTE
Reviewed imaging including thoracic spine MRI and lumbar spine MRI.  Neither were very remarkable for a cause.  Likely myofascial in nature.  Repeat trigger point injection x3 to the right thoracic paraspinal musculature without steroids.  She is happy with the relief obtained from previous injections.  She recalls proximally 20% relief until more recently where pain has gradually returned.  Previous injection on 07/02/2024 was with 40 mg of Depo-Medrol and 4 cc of 1% lidocaine.  See procedure note for details   Return to clinic in 6-8 weeks.  Consider repeat trigger point injection with steroids at that time dependent on relief from today's injection without steroids.

## 2024-08-13 NOTE — PROGRESS NOTES
PHYSICAL MEDICINE AND REHABILITATION  F/u visit:    Subjective:   Chief Complaint:    Follow-up (Patient is here for her 6-8 Wk F/U from her last trigger point injection. She states her pain has returned and is a 6/10 today. She would like to go ahead and get the trigger point injection again today without steroid. )    Interval note on 08/13/2024: Patient arrives today for scheduled follow up from her last clinic visit.  At this visit I performed trigger point injections to the right thoracic paraspinal musculature which provided relief of her pain.  She is here requesting repeat injections.  Denies any unwanted side effects of the previous injections.  No recent infection or antibiotic use.    HPI: Gil Cueva is a 59 y.o. female with  has a past medical history of Breast cancer, General anesthetics causing adverse effect in therapeutic use, and PONV (postoperative nausea and vomiting). She was sent to me for consultation for Today,  she complains of chronic right-sided mid to upper lumbar pain.  The pain will sometimes radiate into the right groin.  She has been to physical therapy and found no lasting benefit.  No radicular complaints into the leg.  No issues with bladder or bowel.  Her pain today is 7/10.  She has been to physical therapy without sustained relief.    Review of Systems  Denies    Imaging/Diagnostic Studies   The MRI is summarized below:     FINDINGS:  Alignment: Thoracic vertebral bodies maintain normal alignment without evidence of abnormal subluxation.  Mild straightening of the midthoracic kyphosis.     Vertebrae: No evidence of an acute fracture or aggressive marrow placement process. Small ovoid T1/T2 hyperintensities in the T6 and T9 vertebral bodies suggestive of incidental hemangiomas.  Susceptibility artifact compatible with ACDF hardware at C5-6 seen on  view.     Cord: Normal morphology and signal.     Degenerative findings: Mild multilevel disc degeneration with disc  desiccation, intervertebral disc space narrowing, degenerative endplate change with Schmorl's node formation and mild disc bulges most pronounced at T6-7 through T11-12.  No focal disc herniation.  Multilevel facet arthropathy, most pronounced in the lower thoracic spine.  No spinal canal stenosis or neural foraminal narrowing at any level.     Paraspinal muscles & soft tissues: Unremarkable.     Impression:     1. No acute fracture or malalignment.  2. Multilevel mild degenerative changes of the thoracic spine without evidence of significant neural foraminal or spinal canal stenosis at any thoracic level.      The MRI lumbar spine is summarized below:     FINDINGS:  There is no evidence of lumbar fracture or acute osseous destructive process.  2 mm L4 anterolisthesis is unchanged.  Alignment at all other levels is normal.  Intervertebral disc height is well preserved, with disc desiccation noted at L4-5 and L5-S1.  Signal within the conus medullaris is within normal limits.     T12-L1, L1-2, L2-3: No significant abnormalities.     L3-4: There is minimal broad-based disc bulging, without significant narrowing of the spinal canal or foramina.     L4-5: There is mild bilateral degenerative facet hypertrophy and minimal L4 anterolisthesis.  Minor broad-based disc bulging is also noted, with left paracentral outer annular tear.  There is minor narrowing of the spinal canal, without significant foraminal stenosis.     L5-S1: Mild bilateral degenerative facet hypertrophy and minimal broad-based disc bulging are noted, with left paracentral outer annular tear.  There is no significant narrowing of the spinal canal or foramina.     Impression:     1. Changes of mild multilevel lumbar degenerative disc and facet disease as above, without high-grade spinal stenosis or evidence of direct nerve root impingement.        Past Medical History:   Diagnosis Date    Breast cancer     General anesthetics causing adverse effect in  therapeutic use     woke up during ankle surgery    PONV (postoperative nausea and vomiting)        Past Surgical History:   Procedure Laterality Date    ANKLE SURGERY Right     2016    BREAST LUMPECTOMY Left 2008    CERVICAL FUSION N/A 20003    c-3, 4, 5    KNEE ARTHROSCOPY W/ MENISCECTOMY Right 10/24/2023    Procedure: ARTHROSCOPY, KNEE, WITH MENISCECTOMY;  Surgeon: Nicanor Luna MD;  Location: [facility];  Service: Orthopedics;  Laterality: Right;  lateral meniscectomy    ROTATOR CUFF REPAIR Right 2016    Dr. Perez       Review of patient's allergies indicates:   Allergen Reactions    Penicillins      I have no idea, my mother told me that  My mom said they gave it to me as a baby and it almost killed me    Sulfa (sulfonamide antibiotics)      I have no idea, my mother told me that    My mom said she is allergic to it so I am allergic to it       Current Outpatient Medications   Medication Sig Dispense Refill    aspirin-acetaminophen-caffeine 250-250-65 mg (EXCEDRIN MIGRAINE) 250-250-65 mg per tablet Take 1 tablet by mouth every 6 (six) hours as needed for Pain.      cyclobenzaprine (FLEXERIL) 5 MG tablet Take 1 tablet (5 mg total) by mouth 3 (three) times daily as needed for Muscle spasms. 30 tablet PRN    ibuprofen (ADVIL,MOTRIN) 200 MG tablet Take 200 mg by mouth every 6 (six) hours as needed for Pain.       Current Facility-Administered Medications   Medication Dose Route Frequency Provider Last Rate Last Admin    cyanocobalamin injection 1,000 mcg  1,000 mcg Subcutaneous Q30 Days Jona Pretty MD   1,000 mcg at 02/17/20 1025     Facility-Administered Medications Ordered in Other Visits   Medication Dose Route Frequency Provider Last Rate Last Admin    electrolyte-S (ISOLYTE)   Intravenous Continuous Rayray Alejo MD 70 mL/hr at 10/24/23 0822 New Bag at 10/24/23 0822    electrolyte-S (ISOLYTE)   Intravenous Continuous Rayray Alejo MD 10 mL/hr at 10/24/23 0619 New Bag at 10/24/23  "0619    LIDOcaine (PF) 10 mg/ml (1%) injection 10 mg  1 mL Intradermal Once Rayray Alejo MD        ondansetron injection 4 mg  4 mg Intravenous Once PRN Rayray Alejo MD           Family History   Problem Relation Name Age of Onset    Breast cancer Maternal Aunt      Melanoma Maternal Grandfather         Social History     Socioeconomic History    Marital status:     Number of children: 1   Tobacco Use    Smoking status: Never    Smokeless tobacco: Never   Substance and Sexual Activity    Alcohol use: Yes    Drug use: No    Sexual activity: Not Currently     Social Determinants of Health     Financial Resource Strain: High Risk (1/31/2024)    Overall Financial Resource Strain (CARDIA)     Difficulty of Paying Living Expenses: Very hard   Food Insecurity: Food Insecurity Present (1/31/2024)    Hunger Vital Sign     Worried About Running Out of Food in the Last Year: Sometimes true     Ran Out of Food in the Last Year: Sometimes true   Transportation Needs: No Transportation Needs (1/31/2024)    PRAPARE - Transportation     Lack of Transportation (Medical): No     Lack of Transportation (Non-Medical): No   Physical Activity: Sufficiently Active (1/31/2024)    Exercise Vital Sign     Days of Exercise per Week: 4 days     Minutes of Exercise per Session: 80 min   Stress: Stress Concern Present (1/31/2024)    [place] [employer] - Occupational Stress Questionnaire     Feeling of Stress : Very much   Housing Stability: Low Risk  (1/31/2024)    Housing Stability Vital Sign     Unable to Pay for Housing in the Last Year: No     Number of Places Lived in the Last Year: 1     Unstable Housing in the Last Year: No         Objective:    /82 (BP Location: Right arm, Patient Position: Sitting, BP Method: Large (Automatic))   Pulse 89   Ht 5' 7" (1.702 m)   Wt 87.1 kg (192 lb)   BMI 30.07 kg/m²   Physical Exam  Vitals and nursing note reviewed.   Constitutional:       Appearance: " Normal appearance.   HENT:      Head: Normocephalic.   Eyes:      Extraocular Movements: Extraocular movements intact.   Cardiovascular:      Rate and Rhythm: Normal rate.      Pulses: Normal pulses.   Pulmonary:      Effort: Pulmonary effort is normal.   Abdominal:      General: Abdomen is flat.   Skin:     General: Skin is warm and dry.   Neurological:      General: No focal deficit present.      Mental Status: She is alert and oriented to person, place, and time. Mental status is at baseline.   Psychiatric:         Mood and Affect: Mood normal.         Behavior: Behavior normal.         Thought Content: Thought content normal.        Back Exam     Tenderness   The patient is experiencing tenderness in the thoracic.    Range of Motion   Extension:  normal   Flexion:  normal   Lateral bend right:  normal   Lateral bend left:  normal   Rotation right:  normal   Rotation left:  normal     Other   Sensation: normal  Gait: normal   Erythema: no back redness  Scars: absent               Assessment:       ICD-10-CM ICD-9-CM    1. Myofascial pain syndrome of thoracic spine  M79.18 729.1 Trigger Point Injection              Plan:   1. Myofascial pain syndrome of thoracic spine  Assessment & Plan:  Reviewed imaging including thoracic spine MRI and lumbar spine MRI.  Neither were very remarkable for a cause.  Likely myofascial in nature.  Repeat trigger point injection x3 to the right thoracic paraspinal musculature without steroids.  She is happy with the relief obtained from previous injections.  She recalls proximally 20% relief until more recently where pain has gradually returned.  Previous injection on 07/02/2024 was with 40 mg of Depo-Medrol and 4 cc of 1% lidocaine.  See procedure note for details   Return to clinic in 6-8 weeks.  Consider repeat trigger point injection with steroids at that time dependent on relief from today's injection without steroids.    Orders:  -     Trigger Point Injection              Ashish Helms MD  Physical Medicine & Rehabilitation     Disclaimer:  This note may have been prepared using voice recognition software, it may have not been extensively proofed, as such there could be errors within the text such as sound alike errors.  Contact the author of this note for clarification.

## 2024-08-13 NOTE — PROCEDURES
Trigger Point Injection    Performed by: Ashish Helms MD  Authorized by: Ashish Helms MD    Thoracic Paraspinal:  Right    Consent Done?:  Yes (Verbal)    Pre-Procedure:   Indications:  Pain and pain relief    Local anesthesia used?: Yes    Local anesthetic:  Lidocaine 1% without epinephrine  Anesthetic total (ml):  5

## 2024-08-15 ENCOUNTER — CLINICAL SUPPORT (OUTPATIENT)
Facility: HOSPITAL | Age: 59
End: 2024-08-15
Payer: MEDICARE

## 2024-08-15 DIAGNOSIS — M54.14 THORACIC AND LUMBOSACRAL NEURITIS: ICD-10-CM

## 2024-08-15 DIAGNOSIS — M54.17 THORACIC AND LUMBOSACRAL NEURITIS: ICD-10-CM

## 2024-08-15 DIAGNOSIS — M79.18 MYOFASCIAL PAIN SYNDROME OF THORACIC SPINE: Primary | Chronic | ICD-10-CM

## 2024-08-15 DIAGNOSIS — M54.6 PAIN IN THORACIC SPINE: ICD-10-CM

## 2024-08-15 PROCEDURE — 97811 ACUP 1/> W/O ESTIM EA ADD 15: CPT | Mod: PO

## 2024-08-15 PROCEDURE — 97810 ACUP 1/> WO ESTIM 1ST 15 MIN: CPT | Mod: PO

## 2024-08-16 NOTE — PROGRESS NOTES
Acupuncture Evaluation Note     [facility]  [facility] Number: [id number]    Traditional Chinese Medicine (TCM) Diagnosis: Qi Stagnation  Medical Diagnosis:   Encounter Diagnoses   Name Primary?    Myofascial pain syndrome of thoracic spine Yes    Pain in thoracic spine     Thoracic and lumbosacral neuritis         Evaluation Date: 8/15/24    Visit #/Visits authorized:1/12    Precautions: Standard    Subjective     Chief Concern: Chronic pain of back and spine; possibly going back 25 years or so, but last year during a race with her son who has special needs they tried to overtake a competitor and she unfortunately has had 7 or 8 out of 10 pain pretty much constantly ever since    Medical necessity is demonstrated by the following IMPAIRMENTS: Medical Necessity: Decreased mobility limits day to day activities, social, and emergent situations              Aggravating Factors:  movement and pressure   Relieving Factors:  nothing    Symptom Description:     Quality:  Aching  Severity:  7  Frequency:  continuously    Previous Treatments Tried:  Medication    HEENT:  not noted     Chest:  not noted    Digestion:     Diet:  normal   Fluids:  normal   Taste/Appetite: normal   Symptoms: no blood in stool    Sleep: could improve    Energy Levels:  could improve    Psychological Symptoms:  not noted    Other Symptoms: not noted     GYN Symptoms: not noted    Objective     Observation: clean and calm    Tongue:      Body:  normal   Color:  pink   Coating:  thin,     Pulse:        wiry       New Findings:  none    Treatment     Treatment Principles:  move qi     Acupuncture points used:  K3, K7, UB 62/Lv5, TB 5, GB 20  Needles In: 10  Needles Out: 10  Needles W/O STIM placed: 2pm  [place] W/O STIM removed: 2:30pm      Other Traditional Chinese Medicine Modalities - Acupressure     Assessment     After treatment, patient felt nearly no pain on the table while getting acupuncture, and could still elicit the pain after but  it had decreased a click or several     Patient prognosis is Good.     Patient will continue to benefit from acupuncture treatment to address the deficits listed in the problem list box on initial evaluation, provide patient family education and to maximize pt's level of independence in the home and community environment.     Patient's spiritual, cultural and educational needs considered and pt agreeable to plan of care and goals.     Anticipated barriers to treatment: none    Plan     Recommend every week or two for 12 sessions with midway re-assess.      Education:  Patient is aware of cumulative benefit of acupuncture

## 2024-08-26 ENCOUNTER — CLINICAL SUPPORT (OUTPATIENT)
Facility: HOSPITAL | Age: 59
End: 2024-08-26
Payer: MEDICARE

## 2024-08-26 DIAGNOSIS — M54.59 OTHER LOW BACK PAIN: Primary | Chronic | ICD-10-CM

## 2024-08-26 PROCEDURE — 97811 ACUP 1/> W/O ESTIM EA ADD 15: CPT | Mod: PO

## 2024-08-26 PROCEDURE — 97810 ACUP 1/> WO ESTIM 1ST 15 MIN: CPT | Mod: PO

## 2024-08-26 NOTE — PROGRESS NOTES
Acupuncture Evaluation Note     [facility]  [facility] Number: [id number]    Traditional Chinese Medicine (TCM) Diagnosis: Qi Stagnation  Medical Diagnosis:   Encounter Diagnosis   Name Primary?    Other low back pain Yes        Evaluation Date: 8/26/24    Visit #/Visits authorized:2/12    Precautions: Standard    Subjective     Chief Concern: Chronic pain of back and spine; possibly going back 25 years or so, but last year during a race with her son who has special needs they tried to overtake a competitor and she unfortunately has had 7 or 8 out of 10 pain pretty much constantly ever since    Medical necessity is demonstrated by the following IMPAIRMENTS: Medical Necessity: Decreased mobility limits day to day activities, social, and emergent situations              Aggravating Factors:  movement and pressure   Relieving Factors:  nothing    Symptom Description:     Quality:  Aching  Severity:  7  Frequency:  continuously    Previous Treatments Tried:  Medication    HEENT:  not noted     Chest:  not noted    Digestion:     Diet:  normal   Fluids:  normal   Taste/Appetite: normal   Symptoms: no blood in stool    Sleep: could improve    Energy Levels:  could improve    Psychological Symptoms:  not noted    Other Symptoms: not noted     GYN Symptoms: not noted    Objective     Observation: clean and calm    Tongue:      Body:  normal   Color:  pink   Coating:  thin,     Pulse:        wiry       New Findings:  none    Treatment     Treatment Principles:  move qi     Acupuncture points used:  K3, K7, UB 62/Lv5, TB 5, GB 20  Needles In: 10  Needles Out: 10  Needles W/O STIM placed: 1pm  [place] W/O STIM removed: 1:30pm      Other Traditional Chinese Medicine Modalities - Acupressure     Assessment     After treatment, patient felt nearly no pain on the table while getting acupuncture, and could still elicit the pain after but it had decreased a click or several     Patient prognosis is Good.     Patient will  continue to benefit from acupuncture treatment to address the deficits listed in the problem list box on initial evaluation, provide patient family education and to maximize pt's level of independence in the home and community environment.     Patient's spiritual, cultural and educational needs considered and pt agreeable to plan of care and goals.     Anticipated barriers to treatment: none    Plan     Recommend every week or two for 12 sessions with midway re-assess.      Education:  Patient is aware of cumulative benefit of acupuncture

## 2024-09-09 ENCOUNTER — CLINICAL SUPPORT (OUTPATIENT)
Facility: HOSPITAL | Age: 59
End: 2024-09-09
Payer: MEDICARE

## 2024-09-09 DIAGNOSIS — M54.59 OTHER LOW BACK PAIN: Primary | ICD-10-CM

## 2024-09-09 PROCEDURE — 97811 ACUP 1/> W/O ESTIM EA ADD 15: CPT | Mod: PO

## 2024-09-09 PROCEDURE — 97810 ACUP 1/> WO ESTIM 1ST 15 MIN: CPT | Mod: PO

## 2024-09-10 NOTE — PROGRESS NOTES
Acupuncture Evaluation Note     [facility]  [facility] Number: [id number]    Traditional Chinese Medicine (TCM) Diagnosis: Qi Stagnation  Medical Diagnosis:   Encounter Diagnosis   Name Primary?    Other low back pain Yes        Evaluation Date: 9/9/24    Visit #/Visits authorized:3/12    Precautions: Standard    Subjective     Chief Concern: Chronic pain of back and spine; possibly going back 25 years or so, but last year during a race with her son who has special needs they tried to overtake a competitor and she unfortunately has had 7 or 8 out of 10 pain pretty much constantly ever since    Medical necessity is demonstrated by the following IMPAIRMENTS: Medical Necessity: Decreased mobility limits day to day activities, social, and emergent situations              Aggravating Factors:  movement and pressure   Relieving Factors:  nothing    Symptom Description:     Quality:  Aching  Severity:  7  Frequency:  continuously    Previous Treatments Tried:  Medication    HEENT:  not noted     Chest:  not noted    Digestion:     Diet:  normal   Fluids:  normal   Taste/Appetite: normal   Symptoms: no blood in stool    Sleep: could improve    Energy Levels:  could improve    Psychological Symptoms:  not noted    Other Symptoms: not noted     GYN Symptoms: not noted    Objective     Observation: clean and calm    Tongue:      Body:  normal   Color:  pink   Coating:  thin,     Pulse:        wiry       New Findings:  none    Treatment     Treatment Principles:  move qi     Acupuncture points used:  K3, K7, UB 62/Lv5, TB 5, GB 20  Needles In: 10  Needles Out: 10  Needles W/O STIM placed: 1pm  [place] W/O STIM removed: 1:30pm      Other Traditional Chinese Medicine Modalities - Acupressure     Assessment     After treatment, patient felt nearly no pain on the table while getting acupuncture, and could still elicit the pain after but it had decreased a click or several     Patient prognosis is Good.     Patient will  continue to benefit from acupuncture treatment to address the deficits listed in the problem list box on initial evaluation, provide patient family education and to maximize pt's level of independence in the home and community environment.     Patient's spiritual, cultural and educational needs considered and pt agreeable to plan of care and goals.     Anticipated barriers to treatment: none    Plan     Recommend every week or two for 12 sessions with midway re-assess.      Education:  Patient is aware of cumulative benefit of acupuncture

## 2024-09-23 ENCOUNTER — CLINICAL SUPPORT (OUTPATIENT)
Facility: HOSPITAL | Age: 59
End: 2024-09-23
Payer: MEDICARE

## 2024-09-23 DIAGNOSIS — M54.59 OTHER LOW BACK PAIN: Primary | ICD-10-CM

## 2024-09-23 PROCEDURE — 97813 ACUP 1/> W/ESTIM 1ST 15 MIN: CPT | Mod: PO

## 2024-09-23 PROCEDURE — 97810 ACUP 1/> WO ESTIM 1ST 15 MIN: CPT | Mod: PO

## 2024-09-24 NOTE — PROGRESS NOTES
Acupuncture Evaluation Note     [facility]  [facility] Number: [id number]    Traditional Chinese Medicine (TCM) Diagnosis: Qi Stagnation  Medical Diagnosis:   Encounter Diagnosis   Name Primary?    Other low back pain Yes          Evaluation Date: 9/23/24    Visit #/Visits authorized:4/12    Precautions: Standard    Subjective     Chief Concern: Chronic pain of back and spine; possibly going back 25 years or so, but last year during a race with her son who has special needs they tried to overtake a competitor and she unfortunately has had 7 or 8 out of 10 pain pretty much constantly ever since    Medical necessity is demonstrated by the following IMPAIRMENTS: Medical Necessity: Decreased mobility limits day to day activities, social, and emergent situations              Aggravating Factors:  movement and pressure   Relieving Factors:  nothing    Symptom Description:     Quality:  Aching  Severity:  7  Frequency:  continuously    Previous Treatments Tried:  Medication    HEENT:  not noted     Chest:  not noted    Digestion:     Diet:  normal   Fluids:  normal   Taste/Appetite: normal   Symptoms: no blood in stool    Sleep: could improve    Energy Levels:  could improve    Psychological Symptoms:  not noted    Other Symptoms: not noted     GYN Symptoms: not noted    Objective     Observation: clean and calm    Tongue:      Body:  normal   Color:  pink   Coating:  thin,     Pulse:        wiry       New Findings:  none    Treatment     Treatment Principles:  move qi     Acupuncture points used:  K3, K7, UB 62/Lv5, TB 5, GB 20  Needles In: 10  Needles Out: 10  Needles W/O STIM placed: 1pm  [place] W/O STIM removed: 1:30pm      Other Traditional Chinese Medicine Modalities - Acupressure     Assessment     After treatment, patient felt nearly no pain on the table while getting acupuncture, and could still elicit the pain after but it had decreased a click or several     Patient prognosis is Good.     Patient will  continue to benefit from acupuncture treatment to address the deficits listed in the problem list box on initial evaluation, provide patient family education and to maximize pt's level of independence in the home and community environment.     Patient's spiritual, cultural and educational needs considered and pt agreeable to plan of care and goals.     Anticipated barriers to treatment: none    Plan     Recommend every week or two for 12 sessions with midway re-assess.      Education:  Patient is aware of cumulative benefit of acupuncture

## 2024-09-25 DIAGNOSIS — Z00.00 ENCOUNTER FOR MEDICARE ANNUAL WELLNESS EXAM: ICD-10-CM

## 2024-10-02 ENCOUNTER — OFFICE VISIT (OUTPATIENT)
Facility: CLINIC | Age: 59
End: 2024-10-02
Payer: MEDICARE

## 2024-10-02 VITALS
RESPIRATION RATE: 18 BRPM | TEMPERATURE: 98 F | SYSTOLIC BLOOD PRESSURE: 150 MMHG | DIASTOLIC BLOOD PRESSURE: 86 MMHG | BODY MASS INDEX: 30.38 KG/M2 | WEIGHT: 194 LBS | HEART RATE: 86 BPM

## 2024-10-02 DIAGNOSIS — Z85.3 HISTORY OF BREAST CANCER: Primary | ICD-10-CM

## 2024-10-02 PROCEDURE — 3008F BODY MASS INDEX DOCD: CPT | Mod: CPTII,S$GLB,, | Performed by: INTERNAL MEDICINE

## 2024-10-02 PROCEDURE — G2211 COMPLEX E/M VISIT ADD ON: HCPCS | Mod: S$GLB,,, | Performed by: INTERNAL MEDICINE

## 2024-10-02 PROCEDURE — 3044F HG A1C LEVEL LT 7.0%: CPT | Mod: CPTII,S$GLB,, | Performed by: INTERNAL MEDICINE

## 2024-10-02 PROCEDURE — 3079F DIAST BP 80-89 MM HG: CPT | Mod: CPTII,S$GLB,, | Performed by: INTERNAL MEDICINE

## 2024-10-02 PROCEDURE — 99999 PR PBB SHADOW E&M-EST. PATIENT-LVL III: CPT | Mod: PBBFAC,,, | Performed by: INTERNAL MEDICINE

## 2024-10-02 PROCEDURE — 1159F MED LIST DOCD IN RCRD: CPT | Mod: CPTII,S$GLB,, | Performed by: INTERNAL MEDICINE

## 2024-10-02 PROCEDURE — 3077F SYST BP >= 140 MM HG: CPT | Mod: CPTII,S$GLB,, | Performed by: INTERNAL MEDICINE

## 2024-10-02 PROCEDURE — 99213 OFFICE O/P EST LOW 20 MIN: CPT | Mod: S$GLB,,, | Performed by: INTERNAL MEDICINE

## 2024-10-02 RX ORDER — CYCLOBENZAPRINE HCL 5 MG
5 TABLET ORAL 3 TIMES DAILY PRN
COMMUNITY
Start: 2024-09-07

## 2024-10-02 NOTE — ASSESSMENT & PLAN NOTE
Patient is doing well.  Exam is negative and she will be due for mammogram in April 2025.  MARIO at this time.  Continue six month monitoring with exam and yearly mammgram.       Also noted are the brain lesion issues.  No new symptoms.  To get MRI in November and will continue follow up with Dr. Muñoz.

## 2024-10-02 NOTE — PROGRESS NOTES
PROGRESS NOTE    Subjective:       Patient ID: Gil Cueva is a 59 y.o. female.    Dx: 8/8/2011, IDCA  Lump/ALND: 8/22/2011  ER 99%, %, Her2 neg  [id number], 8mm tumor.   Low risk oncotype:  Began Lennon 10/3/2011-2/2017    3/30/2023-MRI Brain:  Two subcentimeter enhancing lesions within the rightward aspect of the james concerning for intracranial metastasis.    5/2/2023-MRI brain(tumor w/ perfusion)  Stable subcentimeter somewhat brush like enhancing lesions within the right james 1 ventrally and 1 dorsally with corresponding susceptibility overall configuration most compatible with capillary telangiectasias.     There is a punctate separate focus of susceptibility in the right mid james suggestive for possible remote microhemorrhage without corresponding edema or enhancement     No evidence for separate enhancing lesion to suggest definite intracranial metastatic disease.    Chief Complaint:  No chief complaint on file.  f/u Stage I breast cancer, brain lesions.     History of Present Illness:   Gil Cueva is a 59 y.o. female who presents routine f/u for breast cancer.     Ms. Cueva is doing ok.  Feeling ok today.       Mammo neg 4/9/2024    Family and Social history reviewed and is unchanged from 9/16/2011.              Current Outpatient Medications:     cyclobenzaprine (FLEXERIL) 5 MG tablet, Take 5 mg by mouth 3 (three) times daily as needed for Muscle spasms., Disp: , Rfl:     aspirin-acetaminophen-caffeine 250-250-65 mg (EXCEDRIN MIGRAINE) 250-250-65 mg per tablet, Take 1 tablet by mouth every 6 (six) hours as needed for Pain., Disp: , Rfl:     ibuprofen (ADVIL,MOTRIN) 200 MG tablet, Take 200 mg by mouth every 6 (six) hours as needed for Pain., Disp: , Rfl:   No current facility-administered medications for this visit.    Facility-Administered Medications Ordered in Other Visits:     electrolyte-S (ISOLYTE), , Intravenous, Continuous,  Rayray Alejo MD, Last Rate: 70 mL/hr at 10/24/23 0822, New Bag at 10/24/23 0822    electrolyte-S (ISOLYTE), , Intravenous, Continuous, Rayray Alejo MD, Last Rate: 10 mL/hr at 10/24/23 0619, New Bag at 10/24/23 0619    LIDOcaine (PF) 10 mg/ml (1%) injection 10 mg, 1 mL, Intradermal, Once, Rayray Alejo MD    ondansetron injection 4 mg, 4 mg, Intravenous, Once PRN, Rayray Alejo MD        Objective:       Physical Examination:     BP (!) 150/86   Pulse 86   Temp 98.2 °F (36.8 °C)   Resp 18   Wt 88 kg (194 lb)   BMI 30.38 kg/m²     Physical Exam  Constitutional:       Appearance: She is well-developed.   HENT:      Head: Normocephalic and atraumatic.      Right Ear: External ear normal.      Left Ear: External ear normal.   Eyes:      Conjunctiva/sclera: Conjunctivae normal.      Pupils: Pupils are equal, round, and reactive to light.   Neck:      Thyroid: No thyromegaly.      Trachea: No tracheal deviation.   Cardiovascular:      Rate and Rhythm: Normal rate and regular rhythm.      Heart sounds: Normal heart sounds.   Pulmonary:      Effort: Pulmonary effort is normal.      Breath sounds: Normal breath sounds.   Chest:       Abdominal:      General: Bowel sounds are normal. There is no distension.      Palpations: Abdomen is soft. There is no mass.      Tenderness: There is no abdominal tenderness.   Skin:     Findings: No rash.   Neurological:      Comments: Neuro intact througout   Psychiatric:         Behavior: Behavior normal.         Thought Content: Thought content normal.         Judgment: Judgment normal.         Labs:   No results found for this or any previous visit (from the past 2 weeks).    CMP  Sodium   Date Value Ref Range Status   08/07/2024 137 136 - 145 mmol/L Final     Potassium   Date Value Ref Range Status   08/07/2024 4.9 3.5 - 5.1 mmol/L Final     Chloride   Date Value Ref Range Status   08/07/2024 105 95 - 110 mmol/L Final     CO2   Date Value Ref Range Status  "  08/07/2024 24 23 - 29 mmol/L Final     Glucose   Date Value Ref Range Status   08/07/2024 86 70 - 110 mg/dL Final     BUN   Date Value Ref Range Status   08/07/2024 17 6 - 20 mg/dL Final     Creatinine   Date Value Ref Range Status   08/07/2024 0.9 0.5 - 1.4 mg/dL Final     Calcium   Date Value Ref Range Status   08/07/2024 9.5 8.7 - 10.5 mg/dL Final     Total Protein   Date Value Ref Range Status   08/07/2024 7.0 6.0 - 8.4 g/dL Final     Albumin   Date Value Ref Range Status   08/07/2024 3.9 3.5 - 5.2 g/dL Final     Total Bilirubin   Date Value Ref Range Status   08/07/2024 0.4 0.1 - 1.0 mg/dL Final     Comment:     For infants and newborns, interpretation of results should be based  on gestational age, weight and in agreement with clinical  observations.    Premature Infant recommended reference ranges:  Up to 24 hours.............<8.0 mg/dL  Up to 48 hours............<12.0 mg/dL  3-5 days..................<15.0 mg/dL  6-29 days.................<15.0 mg/dL       Alkaline Phosphatase   Date Value Ref Range Status   08/07/2024 55 55 - 135 U/L Final     AST   Date Value Ref Range Status   08/07/2024 15 10 - 40 U/L Final     ALT   Date Value Ref Range Status   08/07/2024 14 10 - 44 U/L Final     Anion Gap   Date Value Ref Range Status   08/07/2024 8 8 - 16 mmol/L Final     eGFR if    Date Value Ref Range Status   08/25/2021 >60.0 >60 mL/min/1.73 m^2 Final     eGFR if non    Date Value Ref Range Status   08/25/2021 >60.0 >60 mL/min/1.73 m^2 Final     Comment:     Calculation used to obtain the estimated glomerular filtration  rate (eGFR) is the CKD-EPI equation.        No results found for: "CEA"  No results found for: "PSA"        Assessment/Plan:     Problem List Items Addressed This Visit       History of breast cancer - Primary     Patient is doing well.  Exam is negative and she will be due for mammogram in April 2025.  MARIO at this time.  Continue six month monitoring with exam " and yearly mammgram.       Also noted are the brain lesion issues.  No new symptoms.  To get MRI in November and will continue follow up with Dr. Muñoz.             Discussion:     Follow up in about 6 months (around 4/2/2025).      Electronically signed by Jona Arrington

## 2024-10-07 ENCOUNTER — PATIENT MESSAGE (OUTPATIENT)
Facility: HOSPITAL | Age: 59
End: 2024-10-07
Payer: MEDICARE

## 2024-10-07 ENCOUNTER — PATIENT MESSAGE (OUTPATIENT)
Facility: CLINIC | Age: 59
End: 2024-10-07
Payer: MEDICARE

## 2024-10-08 ENCOUNTER — OFFICE VISIT (OUTPATIENT)
Dept: PHYSICAL MEDICINE AND REHAB | Facility: CLINIC | Age: 59
End: 2024-10-08
Payer: MEDICARE

## 2024-10-08 DIAGNOSIS — M79.18 MYOFASCIAL PAIN SYNDROME OF THORACIC SPINE: Primary | ICD-10-CM

## 2024-10-08 PROCEDURE — 99213 OFFICE O/P EST LOW 20 MIN: CPT | Mod: 25,S$GLB,, | Performed by: STUDENT IN AN ORGANIZED HEALTH CARE EDUCATION/TRAINING PROGRAM

## 2024-10-08 PROCEDURE — 20552 NJX 1/MLT TRIGGER POINT 1/2: CPT | Mod: S$GLB,,, | Performed by: STUDENT IN AN ORGANIZED HEALTH CARE EDUCATION/TRAINING PROGRAM

## 2024-10-08 PROCEDURE — 99999 PR PBB SHADOW E&M-EST. PATIENT-LVL I: CPT | Mod: PBBFAC,,, | Performed by: STUDENT IN AN ORGANIZED HEALTH CARE EDUCATION/TRAINING PROGRAM

## 2024-10-08 PROCEDURE — 3044F HG A1C LEVEL LT 7.0%: CPT | Mod: CPTII,S$GLB,, | Performed by: STUDENT IN AN ORGANIZED HEALTH CARE EDUCATION/TRAINING PROGRAM

## 2024-10-08 RX ORDER — METHYLPREDNISOLONE ACETATE 40 MG/ML
40 INJECTION, SUSPENSION INTRA-ARTICULAR; INTRALESIONAL; INTRAMUSCULAR; SOFT TISSUE
Status: DISCONTINUED | OUTPATIENT
Start: 2024-10-08 | End: 2024-10-08 | Stop reason: HOSPADM

## 2024-10-08 RX ADMIN — METHYLPREDNISOLONE ACETATE 40 MG: 40 INJECTION, SUSPENSION INTRA-ARTICULAR; INTRALESIONAL; INTRAMUSCULAR; SOFT TISSUE at 01:10

## 2024-10-08 NOTE — PROGRESS NOTES
PHYSICAL MEDICINE AND REHABILITATION  F/u visit:    Subjective:   Chief Complaint:    F/u visit:     Interval note on 10/08/2024: She arrives for scheduled f/u. She would like to repeat injections. She found good relief with the previous injeciton with lidocaine only but had more relief with steroid.  To recalls proximally sacral weeks of relief of her pain with lidocaine only injections.  She would like to repeat if possible.    Interval note on 08/13/2024: Patient arrives today for scheduled follow up from her last clinic visit.  At this visit I performed trigger point injections to the right thoracic paraspinal musculature which provided relief of her pain.  She is here requesting repeat injections.  Denies any unwanted side effects of the previous injections.  No recent infection or antibiotic use.    HPI: She was sent to me for consultation for Today,  she complains of chronic right-sided mid to upper lumbar pain.  The pain will sometimes radiate into the right groin.  She has been to physical therapy and found no lasting benefit.  No radicular complaints into the leg.  No issues with bladder or bowel.  Her pain today is 7/10.  She has been to physical therapy without sustained relief.    Review of Systems  Denies    Imaging/Diagnostic Studies   The MRI is summarized below:     FINDINGS:  Alignment: Thoracic vertebral bodies maintain normal alignment without evidence of abnormal subluxation.  Mild straightening of the midthoracic kyphosis.     Vertebrae: No evidence of an acute fracture or aggressive marrow placement process. Small ovoid T1/T2 hyperintensities in the T6 and T9 vertebral bodies suggestive of incidental hemangiomas.  Susceptibility artifact compatible with ACDF hardware at C5-6 seen on  view.     Cord: Normal morphology and signal.     Degenerative findings: Mild multilevel disc degeneration with disc desiccation, intervertebral disc space narrowing, degenerative endplate change with  Schmorl's node formation and mild disc bulges most pronounced at T6-7 through T11-12.  No focal disc herniation.  Multilevel facet arthropathy, most pronounced in the lower thoracic spine.  No spinal canal stenosis or neural foraminal narrowing at any level.     Paraspinal muscles & soft tissues: Unremarkable.     Impression:     1. No acute fracture or malalignment.  2. Multilevel mild degenerative changes of the thoracic spine without evidence of significant neural foraminal or spinal canal stenosis at any thoracic level.      The MRI lumbar spine is summarized below:     FINDINGS:  There is no evidence of lumbar fracture or acute osseous destructive process.  2 mm L4 anterolisthesis is unchanged.  Alignment at all other levels is normal.  Intervertebral disc height is well preserved, with disc desiccation noted at L4-5 and L5-S1.  Signal within the conus medullaris is within normal limits.     T12-L1, L1-2, L2-3: No significant abnormalities.     L3-4: There is minimal broad-based disc bulging, without significant narrowing of the spinal canal or foramina.     L4-5: There is mild bilateral degenerative facet hypertrophy and minimal L4 anterolisthesis.  Minor broad-based disc bulging is also noted, with left paracentral outer annular tear.  There is minor narrowing of the spinal canal, without significant foraminal stenosis.     L5-S1: Mild bilateral degenerative facet hypertrophy and minimal broad-based disc bulging are noted, with left paracentral outer annular tear.  There is no significant narrowing of the spinal canal or foramina.     Impression:     1. Changes of mild multilevel lumbar degenerative disc and facet disease as above, without high-grade spinal stenosis or evidence of direct nerve root impingement.        Past Medical History:   Diagnosis Date    Breast cancer     General anesthetics causing adverse effect in therapeutic use     woke up during ankle surgery    PONV (postoperative nausea and  vomiting)        Past Surgical History:   Procedure Laterality Date    ANKLE SURGERY Right     2016    BREAST LUMPECTOMY Left 2008    CERVICAL FUSION N/A 20003    c-3, 4, 5    KNEE ARTHROSCOPY W/ MENISCECTOMY Right 10/24/2023    Procedure: ARTHROSCOPY, KNEE, WITH MENISCECTOMY;  Surgeon: Nicanor Luna MD;  Location: [facility];  Service: Orthopedics;  Laterality: Right;  lateral meniscectomy    ROTATOR CUFF REPAIR Right 2016    Dr. Perez       Review of patient's allergies indicates:   Allergen Reactions    Penicillins      I have no idea, my mother told me that  My mom said they gave it to me as a baby and it almost killed me    Sulfa (sulfonamide antibiotics)      I have no idea, my mother told me that    My mom said she is allergic to it so I am allergic to it       Current Outpatient Medications   Medication Sig Dispense Refill    aspirin-acetaminophen-caffeine 250-250-65 mg (EXCEDRIN MIGRAINE) 250-250-65 mg per tablet Take 1 tablet by mouth every 6 (six) hours as needed for Pain.      cyclobenzaprine (FLEXERIL) 5 MG tablet Take 5 mg by mouth 3 (three) times daily as needed for Muscle spasms.      ibuprofen (ADVIL,MOTRIN) 200 MG tablet Take 200 mg by mouth every 6 (six) hours as needed for Pain.       No current facility-administered medications for this visit.     Facility-Administered Medications Ordered in Other Visits   Medication Dose Route Frequency Provider Last Rate Last Admin    electrolyte-S (ISOLYTE)   Intravenous Continuous Rayray Alejo MD 70 mL/hr at 10/24/23 0822 New Bag at 10/24/23 0822    electrolyte-S (ISOLYTE)   Intravenous Continuous Rayray Alejo MD 10 mL/hr at 10/24/23 0619 New Bag at 10/24/23 0619    LIDOcaine (PF) 10 mg/ml (1%) injection 10 mg  1 mL Intradermal Once Rayray Alejo MD        ondansetron injection 4 mg  4 mg Intravenous Once PRN Rayray Alejo MD           Family History   Problem Relation Name Age of Onset    Breast cancer Maternal Aunt       Melanoma Maternal Grandfather         Social History     Socioeconomic History    Marital status:     Number of children: 1   Tobacco Use    Smoking status: Never    Smokeless tobacco: Never   Substance and Sexual Activity    Alcohol use: Yes    Drug use: No    Sexual activity: Not Currently     Social Drivers of Health     Financial Resource Strain: High Risk (1/31/2024)    Overall Financial Resource Strain (CARDIA)     Difficulty of Paying Living Expenses: Very hard   Food Insecurity: Food Insecurity Present (1/31/2024)    Hunger Vital Sign     Worried About Running Out of Food in the Last Year: Sometimes true     Ran Out of Food in the Last Year: Sometimes true   Transportation Needs: No Transportation Needs (1/31/2024)    PRAPARE - Transportation     Lack of Transportation (Medical): No     Lack of Transportation (Non-Medical): No   Physical Activity: Sufficiently Active (1/31/2024)    Exercise Vital Sign     Days of Exercise per Week: 4 days     Minutes of Exercise per Session: 80 min   Stress: Stress Concern Present (1/31/2024)    [employer] - Occupational Stress Questionnaire     Feeling of Stress : Very much   Housing Stability: Low Risk  (1/31/2024)    Housing Stability Vital Sign     Unable to Pay for Housing in the Last Year: No     Number of Places Lived in the Last Year: 1     Unstable Housing in the Last Year: No         Objective:    There were no vitals taken for this visit.  Physical Exam  Vitals and nursing note reviewed.   Constitutional:       Appearance: Normal appearance.   HENT:      Head: Normocephalic.   Eyes:      Extraocular Movements: Extraocular movements intact.   Cardiovascular:      Rate and Rhythm: Normal rate.      Pulses: Normal pulses.   Pulmonary:      Effort: Pulmonary effort is normal.   Abdominal:      General: Abdomen is flat.   Skin:     General: Skin is warm and dry.   Neurological:      General: No focal deficit present.      Mental  Status: She is alert and oriented to person, place, and time. Mental status is at baseline.   Psychiatric:         Mood and Affect: Mood normal.         Behavior: Behavior normal.         Thought Content: Thought content normal.        Back Exam     Tenderness   The patient is experiencing tenderness in the thoracic.    Range of Motion   Extension:  normal   Flexion:  normal   Lateral bend right:  normal   Lateral bend left:  normal   Rotation right:  normal   Rotation left:  normal     Other   Sensation: normal  Gait: normal   Erythema: no back redness  Scars: absent               Assessment:       ICD-10-CM ICD-9-CM    1. Myofascial pain syndrome of thoracic spine  M79.18 729.1 Trigger Point Injection                Plan:   1. Myofascial pain syndrome of thoracic spine  Assessment & Plan:  Reviewed imaging including thoracic spine MRI and lumbar spine MRI.  Neither were very remarkable for a cause.  Likely myofascial in nature.  Repeat trigger point injection x3 to the right thoracic paraspinal musculature with steroids.  She is happy with the relief obtained from previous injections.  She recalls approximately 20% relief until more recently where pain has gradually returned.  See procedure note for details.   Return to clinic in 6-8 weeks.  She has more pain relief with steroids than without.  Consider repeat trigger point injection without steroids at that time dependent on relief from today's injection without steroids.    Procedure log:   10/08/2024 TPI to the right thoracic paraspinal musculature with steroids  08/13/2024:  TPI without steroids  07/02/2024 TPI with 40 mg of Depo-Medrol and 4 cc of 1% lidocaine    Orders:  -     Trigger Point Injection      Ashish Helms MD  Physical Medicine & Rehabilitation     Disclaimer:  This note may have been prepared using voice recognition software, it may have not been extensively proofed, as such there could be errors within the text such as sound alike  errors.  Contact the author of this note for clarification.

## 2024-10-08 NOTE — ASSESSMENT & PLAN NOTE
Reviewed imaging including thoracic spine MRI and lumbar spine MRI.  Neither were very remarkable for a cause.  Likely myofascial in nature.  Repeat trigger point injection x3 to the right thoracic paraspinal musculature with steroids.  She is happy with the relief obtained from previous injections.  She recalls approximately 20% relief until more recently where pain has gradually returned.  See procedure note for details.   Return to clinic in 6-8 weeks.  She has more pain relief with steroids than without.  Consider repeat trigger point injection without steroids at that time dependent on relief from today's injection without steroids.    Procedure log:   10/08/2024 TPI to the right thoracic paraspinal musculature with steroids  08/13/2024:  TPI without steroids  07/02/2024 TPI with 40 mg of Depo-Medrol and 4 cc of 1% lidocaine

## 2024-10-21 ENCOUNTER — CLINICAL SUPPORT (OUTPATIENT)
Facility: HOSPITAL | Age: 59
End: 2024-10-21
Payer: MEDICARE

## 2024-10-21 DIAGNOSIS — M54.59 OTHER LOW BACK PAIN: Primary | ICD-10-CM

## 2024-10-21 PROCEDURE — 97810 ACUP 1/> WO ESTIM 1ST 15 MIN: CPT | Mod: PO

## 2024-10-21 PROCEDURE — 97813 ACUP 1/> W/ESTIM 1ST 15 MIN: CPT | Mod: PO

## 2024-10-24 NOTE — PROGRESS NOTES
Acupuncture Evaluation Note     [facility]  [facility] Number: [id number]    Traditional Chinese Medicine (TCM) Diagnosis: Qi Stagnation  Medical Diagnosis:   Encounter Diagnosis   Name Primary?    Other low back pain Yes          Evaluation Date:10/21/24    Visit #/Visits authorized:5/12    Precautions: Standard    Subjective     Chief Concern: Chronic pain of back and spine; possibly going back 25 years or so, but last year during a race with her son who has special needs they tried to overtake a competitor and she unfortunately has had 7 or 8 out of 10 pain pretty much constantly ever since    Medical necessity is demonstrated by the following IMPAIRMENTS: Medical Necessity: Decreased mobility limits day to day activities, social, and emergent situations              Aggravating Factors:  movement and pressure   Relieving Factors:  nothing    Symptom Description:     Quality:  Aching  Severity:  7  Frequency:  continuously    Previous Treatments Tried:  Medication    HEENT:  not noted     Chest:  not noted    Digestion:     Diet:  normal   Fluids:  normal   Taste/Appetite: normal   Symptoms: no blood in stool    Sleep: could improve    Energy Levels:  could improve    Psychological Symptoms:  not noted    Other Symptoms: not noted     GYN Symptoms: not noted    Objective     Observation: clean and calm    Tongue:      Body:  normal   Color:  pink   Coating:  thin,     Pulse:        wiry       New Findings:  none    Treatment     Treatment Principles:  move qi     Acupuncture points used:  K3, K7, UB 62/Lv5, TB 5, GB 20  Needles In: 10  Needles Out: 10  Needles W/O STIM placed: 1pm  [place] W/O STIM removed: 1:30pm      Other Traditional Chinese Medicine Modalities - Acupressure     Assessment     After treatment, patient felt nearly no pain on the table while getting acupuncture, and could still elicit the pain after but it had decreased a click or several     Patient prognosis is Good.     Patient will  continue to benefit from acupuncture treatment to address the deficits listed in the problem list box on initial evaluation, provide patient family education and to maximize pt's level of independence in the home and community environment.     Patient's spiritual, cultural and educational needs considered and pt agreeable to plan of care and goals.     Anticipated barriers to treatment: none    Plan     Recommend every week or two for 12 sessions with midway re-assess.      Education:  Patient is aware of cumulative benefit of acupuncture

## 2024-11-04 ENCOUNTER — CLINICAL SUPPORT (OUTPATIENT)
Facility: HOSPITAL | Age: 59
End: 2024-11-04
Payer: MEDICARE

## 2024-11-04 DIAGNOSIS — M54.59 OTHER LOW BACK PAIN: Primary | ICD-10-CM

## 2024-11-04 PROCEDURE — 97810 ACUP 1/> WO ESTIM 1ST 15 MIN: CPT | Mod: PO

## 2024-11-04 PROCEDURE — 97813 ACUP 1/> W/ESTIM 1ST 15 MIN: CPT | Mod: PO

## 2024-11-06 NOTE — PROGRESS NOTES
Acupuncture Evaluation Note     [facility]  [facility] Number: [id number]    Traditional Chinese Medicine (TCM) Diagnosis: Qi Stagnation  Medical Diagnosis:   Encounter Diagnosis   Name Primary?    Other low back pain Yes          Evaluation Date:11/4/24    Visit #/Visits authorized:6/12    Precautions: Standard    Subjective     Chief Concern: Chronic pain of back and spine; possibly going back 25 years or so, but last year during a race with her son who has special needs they tried to overtake a competitor and she unfortunately has had 7 or 8 out of 10 pain pretty much constantly ever since    Medical necessity is demonstrated by the following IMPAIRMENTS: Medical Necessity: Decreased mobility limits day to day activities, social, and emergent situations              Aggravating Factors:  movement and pressure   Relieving Factors:  nothing    Symptom Description:     Quality:  Aching  Severity:  7  Frequency:  continuously    Previous Treatments Tried:  Medication    HEENT:  not noted     Chest:  not noted    Digestion:     Diet:  normal   Fluids:  normal   Taste/Appetite: normal   Symptoms: no blood in stool    Sleep: could improve    Energy Levels:  could improve    Psychological Symptoms:  not noted    Other Symptoms: not noted     GYN Symptoms: not noted    Objective     Observation: clean and calm    Tongue:      Body:  normal   Color:  pink   Coating:  thin,     Pulse:        wiry       New Findings:  none    Treatment     Treatment Principles:  move qi     Acupuncture points used:  K3, K7, UB 62/Lv5, TB 5, GB 20  Needles In: 10  Needles Out: 10  Needles W/O STIM placed: 1pm  [place] W/O STIM removed: 1:30pm      Other Traditional Chinese Medicine Modalities - Acupressure     Assessment     After treatment, patient felt nearly no pain on the table while getting acupuncture, and could still elicit the pain after but it had decreased a click or several     Patient prognosis is Good.     Patient will  continue to benefit from acupuncture treatment to address the deficits listed in the problem list box on initial evaluation, provide patient family education and to maximize pt's level of independence in the home and community environment.     Patient's spiritual, cultural and educational needs considered and pt agreeable to plan of care and goals.     Anticipated barriers to treatment: none    Plan     Recommend every week or two for 12 sessions with midway re-assess.      Education:  Patient is aware of cumulative benefit of acupuncture

## 2024-12-02 ENCOUNTER — CLINICAL SUPPORT (OUTPATIENT)
Facility: HOSPITAL | Age: 59
End: 2024-12-02
Payer: MEDICARE

## 2024-12-02 DIAGNOSIS — M54.59 OTHER LOW BACK PAIN: Primary | ICD-10-CM

## 2024-12-02 PROCEDURE — 97810 ACUP 1/> WO ESTIM 1ST 15 MIN: CPT | Mod: PO

## 2024-12-02 PROCEDURE — 97811 ACUP 1/> W/O ESTIM EA ADD 15: CPT | Mod: PO

## 2024-12-03 NOTE — PROGRESS NOTES
Acupuncture Evaluation Note     [facility]  [facility] Number: [id number]    Traditional Chinese Medicine (TCM) Diagnosis: Qi Stagnation  Medical Diagnosis:   Encounter Diagnosis   Name Primary?    Other low back pain Yes          Evaluation Date:12/2/24    Visit #/Visits authorized:7/12    Precautions: Standard    Subjective     Chief Concern: Chronic pain of back and spine; possibly going back 25 years or so, but last year during a race with her son who has special needs they tried to overtake a competitor and she unfortunately has had 7 or 8 out of 10 pain pretty much constantly ever since    Medical necessity is demonstrated by the following IMPAIRMENTS: Medical Necessity: Decreased mobility limits day to day activities, social, and emergent situations              Aggravating Factors:  movement and pressure   Relieving Factors:  nothing    Symptom Description:     Quality:  Aching  Severity:  7  Frequency:  continuously    Previous Treatments Tried:  Medication    HEENT:  not noted     Chest:  not noted    Digestion:     Diet:  normal   Fluids:  normal   Taste/Appetite: normal   Symptoms: no blood in stool    Sleep: could improve    Energy Levels:  could improve    Psychological Symptoms:  not noted    Other Symptoms: not noted     GYN Symptoms: not noted    Objective     Observation: clean and calm    Tongue:      Body:  normal   Color:  pink   Coating:  thin,     Pulse:        wiry       New Findings:  none    Treatment     Treatment Principles:  move qi     Acupuncture points used:  K3, K7, UB 62/Lv5, TB 5, GB 20  Needles In: 10  Needles Out: 10  Needles W/O STIM placed: 1pm  [place] W/O STIM removed: 1:30pm      Other Traditional Chinese Medicine Modalities - Acupressure     Assessment     After treatment, patient felt nearly no pain on the table while getting acupuncture, and could still elicit the pain after but it had decreased a click or several     Patient prognosis is Good.     Patient will  continue to benefit from acupuncture treatment to address the deficits listed in the problem list box on initial evaluation, provide patient family education and to maximize pt's level of independence in the home and community environment.     Patient's spiritual, cultural and educational needs considered and pt agreeable to plan of care and goals.     Anticipated barriers to treatment: none    Plan     Recommend every week or two for 12 sessions with midway re-assess.      Education:  Patient is aware of cumulative benefit of acupuncture

## 2025-02-22 DIAGNOSIS — Z00.00 ENCOUNTER FOR MEDICARE ANNUAL WELLNESS EXAM: ICD-10-CM

## 2025-03-11 ENCOUNTER — PATIENT MESSAGE (OUTPATIENT)
Dept: OPHTHALMOLOGY | Facility: CLINIC | Age: 60
End: 2025-03-11
Payer: MEDICARE

## 2025-04-02 ENCOUNTER — OFFICE VISIT (OUTPATIENT)
Facility: CLINIC | Age: 60
End: 2025-04-02
Payer: MEDICARE

## 2025-04-02 VITALS
SYSTOLIC BLOOD PRESSURE: 139 MMHG | TEMPERATURE: 98 F | WEIGHT: 190.5 LBS | BODY MASS INDEX: 29.84 KG/M2 | RESPIRATION RATE: 17 BRPM | HEART RATE: 87 BPM | DIASTOLIC BLOOD PRESSURE: 81 MMHG

## 2025-04-02 DIAGNOSIS — G93.9 BRAIN LESION: ICD-10-CM

## 2025-04-02 DIAGNOSIS — Z17.0 MALIGNANT NEOPLASM OF CENTRAL PORTION OF LEFT BREAST IN FEMALE, ESTROGEN RECEPTOR POSITIVE: Primary | ICD-10-CM

## 2025-04-02 DIAGNOSIS — C50.112 MALIGNANT NEOPLASM OF CENTRAL PORTION OF LEFT BREAST IN FEMALE, ESTROGEN RECEPTOR POSITIVE: Primary | ICD-10-CM

## 2025-04-02 DIAGNOSIS — Z12.31 ENCOUNTER FOR SCREENING MAMMOGRAM FOR MALIGNANT NEOPLASM OF BREAST: ICD-10-CM

## 2025-04-02 PROCEDURE — 99213 OFFICE O/P EST LOW 20 MIN: CPT | Mod: S$GLB,,, | Performed by: INTERNAL MEDICINE

## 2025-04-02 PROCEDURE — G2211 COMPLEX E/M VISIT ADD ON: HCPCS | Mod: S$GLB,,, | Performed by: INTERNAL MEDICINE

## 2025-04-02 PROCEDURE — 99999 PR PBB SHADOW E&M-EST. PATIENT-LVL III: CPT | Mod: PBBFAC,,, | Performed by: INTERNAL MEDICINE

## 2025-04-02 PROCEDURE — 1159F MED LIST DOCD IN RCRD: CPT | Mod: CPTII,S$GLB,, | Performed by: INTERNAL MEDICINE

## 2025-04-02 PROCEDURE — 3079F DIAST BP 80-89 MM HG: CPT | Mod: CPTII,S$GLB,, | Performed by: INTERNAL MEDICINE

## 2025-04-02 PROCEDURE — 3008F BODY MASS INDEX DOCD: CPT | Mod: CPTII,S$GLB,, | Performed by: INTERNAL MEDICINE

## 2025-04-02 PROCEDURE — 3075F SYST BP GE 130 - 139MM HG: CPT | Mod: CPTII,S$GLB,, | Performed by: INTERNAL MEDICINE

## 2025-04-02 NOTE — ASSESSMENT & PLAN NOTE
Patient has been seen by Dr. Muñoz but was supposed to follow up and was not called.  Will message his office.

## 2025-04-02 NOTE — ASSESSMENT & PLAN NOTE
Patient is doing well and appears MARIO at this time.  Exam is negative and will continue to monitor with six month follow up.  Discussed this today.

## 2025-04-02 NOTE — PROGRESS NOTES
PROGRESS NOTE    Subjective:       Patient ID: Gil Cueva is a 60 y.o. female.    Dx: 8/8/2011, IDCA  Lump/ALND: 8/22/2011  ER 99%, %, Her2 neg  [id number], 8mm tumor.   Low risk oncotype:  Began Lennon 10/3/2011-2/2017    3/30/2023-MRI Brain:  Two subcentimeter enhancing lesions within the rightward aspect of the james concerning for intracranial metastasis.    5/2/2023-MRI brain(tumor w/ perfusion)  Stable subcentimeter somewhat brush like enhancing lesions within the right james 1 ventrally and 1 dorsally with corresponding susceptibility overall configuration most compatible with capillary telangiectasias.     There is a punctate separate focus of susceptibility in the right mid james suggestive for possible remote microhemorrhage without corresponding edema or enhancement     No evidence for separate enhancing lesion to suggest definite intracranial metastatic disease.    Chief Complaint:  No chief complaint on file.  f/u Stage I breast cancer, brain lesions.     History of Present Illness:   Gil Cueva is a 60 y.o. female who presents routine f/u for breast cancer.     Ms. Cueva is doing ok.  Feeling ok today.       Mammo neg 4/9/2024    Family and Social history reviewed and is unchanged from 9/16/2011.              Current Outpatient Medications:     aspirin-acetaminophen-caffeine 250-250-65 mg (EXCEDRIN MIGRAINE) 250-250-65 mg per tablet, Take 1 tablet by mouth every 6 (six) hours as needed for Pain., Disp: , Rfl:     ibuprofen (ADVIL,MOTRIN) 200 MG tablet, Take 200 mg by mouth every 6 (six) hours as needed for Pain., Disp: , Rfl:   No current facility-administered medications for this visit.    Facility-Administered Medications Ordered in Other Visits:     electrolyte-S (ISOLYTE), , Intravenous, Continuous, CousinRayray MD, Last Rate: 70 mL/hr at 10/24/23 0822, New Bag at 10/24/23 0822    electrolyte-S (ISOLYTE), , Intravenous,  Continuous, Rayray Alejo MD, Last Rate: 10 mL/hr at 10/24/23 0619, New Bag at 10/24/23 0619    LIDOcaine (PF) 10 mg/ml (1%) injection 10 mg, 1 mL, Intradermal, Once, Rayray Alejo MD    ondansetron injection 4 mg, 4 mg, Intravenous, Once PRN, Rayray Alejo MD        Objective:       Physical Examination:     /81   Pulse 87   Temp 98.1 °F (36.7 °C)   Resp 17   Wt 86.4 kg (190 lb 8 oz)   BMI 29.84 kg/m²     Physical Exam  Constitutional:       Appearance: She is well-developed.   HENT:      Head: Normocephalic and atraumatic.      Right Ear: External ear normal.      Left Ear: External ear normal.   Eyes:      Conjunctiva/sclera: Conjunctivae normal.      Pupils: Pupils are equal, round, and reactive to light.   Neck:      Thyroid: No thyromegaly.      Trachea: No tracheal deviation.   Cardiovascular:      Rate and Rhythm: Normal rate and regular rhythm.      Heart sounds: Normal heart sounds.   Pulmonary:      Effort: Pulmonary effort is normal.      Breath sounds: Normal breath sounds.   Chest:       Abdominal:      General: Bowel sounds are normal. There is no distension.      Palpations: Abdomen is soft. There is no mass.      Tenderness: There is no abdominal tenderness.   Skin:     Findings: No rash.   Neurological:      Comments: Neuro intact througout   Psychiatric:         Behavior: Behavior normal.         Thought Content: Thought content normal.         Judgment: Judgment normal.         Labs:   No results found for this or any previous visit (from the past 2 weeks).    CMP  Sodium   Date Value Ref Range Status   08/07/2024 137 136 - 145 mmol/L Final     Potassium   Date Value Ref Range Status   08/07/2024 4.9 3.5 - 5.1 mmol/L Final     Chloride   Date Value Ref Range Status   08/07/2024 105 95 - 110 mmol/L Final     CO2   Date Value Ref Range Status   08/07/2024 24 23 - 29 mmol/L Final     Glucose   Date Value Ref Range Status   08/07/2024 86 70 - 110 mg/dL Final     BUN   Date  "Value Ref Range Status   08/07/2024 17 6 - 20 mg/dL Final     Creatinine   Date Value Ref Range Status   08/07/2024 0.9 0.5 - 1.4 mg/dL Final     Calcium   Date Value Ref Range Status   08/07/2024 9.5 8.7 - 10.5 mg/dL Final     Total Protein   Date Value Ref Range Status   08/07/2024 7.0 6.0 - 8.4 g/dL Final     Albumin   Date Value Ref Range Status   08/07/2024 3.9 3.5 - 5.2 g/dL Final     Total Bilirubin   Date Value Ref Range Status   08/07/2024 0.4 0.1 - 1.0 mg/dL Final     Comment:     For infants and newborns, interpretation of results should be based  on gestational age, weight and in agreement with clinical  observations.    Premature Infant recommended reference ranges:  Up to 24 hours.............<8.0 mg/dL  Up to 48 hours............<12.0 mg/dL  3-5 days..................<15.0 mg/dL  6-29 days.................<15.0 mg/dL       Alkaline Phosphatase   Date Value Ref Range Status   08/07/2024 55 55 - 135 U/L Final     AST   Date Value Ref Range Status   08/07/2024 15 10 - 40 U/L Final     ALT   Date Value Ref Range Status   08/07/2024 14 10 - 44 U/L Final     Anion Gap   Date Value Ref Range Status   08/07/2024 8 8 - 16 mmol/L Final     eGFR if    Date Value Ref Range Status   08/25/2021 >60.0 >60 mL/min/1.73 m^2 Final     eGFR if non    Date Value Ref Range Status   08/25/2021 >60.0 >60 mL/min/1.73 m^2 Final     Comment:     Calculation used to obtain the estimated glomerular filtration  rate (eGFR) is the CKD-EPI equation.        No results found for: "CEA"  No results found for: "PSA"        Assessment/Plan:     Problem List Items Addressed This Visit       Brain lesion    Patient has been seen by Dr. Muñoz but was supposed to follow up and was not called.  Will message his office.           Malignant neoplasm of central portion of left breast in female, estrogen receptor positive - Primary    Patient is doing well and appears MARIO at this time.  Exam is negative and will " continue to monitor with six month follow up.  Discussed this today.          Relevant Orders    Mammo Digital Screening Bilat w/ Nabeel (XPD)     Other Visit Diagnoses         Encounter for screening mammogram for malignant neoplasm of breast        Relevant Orders    Mammo Digital Screening Bilat w/ Nabeel (XPD)              Discussion:     Follow up in about 6 months (around 10/2/2025).      Electronically signed by Jona Arrington

## 2025-04-14 ENCOUNTER — PATIENT MESSAGE (OUTPATIENT)
Dept: HEMATOLOGY/ONCOLOGY | Facility: CLINIC | Age: 60
End: 2025-04-14
Payer: MEDICARE

## 2025-04-30 ENCOUNTER — TELEPHONE (OUTPATIENT)
Dept: NEUROSURGERY | Facility: CLINIC | Age: 60
End: 2025-04-30
Payer: MEDICARE

## 2025-04-30 DIAGNOSIS — G93.9 BRAIN LESION: Primary | ICD-10-CM

## 2025-04-30 NOTE — TELEPHONE ENCOUNTER
LVM for pt. Informed her that MRI has been schedule for 5/8 by  and that I am scheduling her with Andressa for 5/20 at 9:30AM. Left callback number for pt for rescheduling if necessary

## 2025-04-30 NOTE — TELEPHONE ENCOUNTER
----- Message from Laura sent at 4/30/2025  9:25 AM CDT -----  Regarding: waiting for a responds / Appt  Gil Cueva calling regarding Patient Advice (message) for # pt  is calling to speak with nurse or provider regarding trying to schedule an appt with provider and they have sent 3 messages via portal and her oncologist has reached out to and no responds pls advise asap

## 2025-05-01 ENCOUNTER — HOSPITAL ENCOUNTER (OUTPATIENT)
Dept: RADIOLOGY | Facility: HOSPITAL | Age: 60
Discharge: HOME | End: 2025-05-01
Attending: INTERNAL MEDICINE
Payer: MEDICARE

## 2025-05-01 DIAGNOSIS — Z12.31 ENCOUNTER FOR SCREENING MAMMOGRAM FOR MALIGNANT NEOPLASM OF BREAST: ICD-10-CM

## 2025-05-01 DIAGNOSIS — Z17.0 MALIGNANT NEOPLASM OF CENTRAL PORTION OF LEFT BREAST IN FEMALE, ESTROGEN RECEPTOR POSITIVE: ICD-10-CM

## 2025-05-01 DIAGNOSIS — C50.112 MALIGNANT NEOPLASM OF CENTRAL PORTION OF LEFT BREAST IN FEMALE, ESTROGEN RECEPTOR POSITIVE: ICD-10-CM

## 2025-05-01 PROCEDURE — 77067 SCR MAMMO BI INCL CAD: CPT | Mod: 26,,, | Performed by: RADIOLOGY

## 2025-05-01 PROCEDURE — 77063 BREAST TOMOSYNTHESIS BI: CPT | Mod: 26,,, | Performed by: RADIOLOGY

## 2025-05-01 PROCEDURE — 77063 BREAST TOMOSYNTHESIS BI: CPT | Mod: TC,PO

## 2025-05-08 ENCOUNTER — PATIENT MESSAGE (OUTPATIENT)
Facility: CLINIC | Age: 60
End: 2025-05-08
Payer: MEDICARE

## 2025-05-08 ENCOUNTER — HOSPITAL ENCOUNTER (OUTPATIENT)
Dept: RADIOLOGY | Facility: HOSPITAL | Age: 60
Discharge: HOME | End: 2025-05-08
Attending: NEUROLOGICAL SURGERY
Payer: MEDICARE

## 2025-05-08 ENCOUNTER — PATIENT MESSAGE (OUTPATIENT)
Dept: NEUROSURGERY | Facility: CLINIC | Age: 60
End: 2025-05-08
Payer: MEDICARE

## 2025-05-08 DIAGNOSIS — G93.9 BRAIN LESION: ICD-10-CM

## 2025-05-08 LAB
CREAT SERPL-MCNC: 0.7 MG/DL (ref 0.5–1.4)
SAMPLE: NORMAL

## 2025-05-08 PROCEDURE — 70553 MRI BRAIN STEM W/O & W/DYE: CPT | Mod: TC,PO

## 2025-05-08 PROCEDURE — A9585 GADOBUTROL INJECTION: HCPCS | Mod: PO | Performed by: NEUROLOGICAL SURGERY

## 2025-05-08 PROCEDURE — 25500020 PHARM REV CODE 255: Mod: PO | Performed by: NEUROLOGICAL SURGERY

## 2025-05-08 PROCEDURE — 70553 MRI BRAIN STEM W/O & W/DYE: CPT | Mod: 26,,, | Performed by: RADIOLOGY

## 2025-05-08 RX ORDER — GADOBUTROL 604.72 MG/ML
8.5 INJECTION INTRAVENOUS
Status: COMPLETED | OUTPATIENT
Start: 2025-05-08 | End: 2025-05-08

## 2025-05-08 RX ADMIN — GADOBUTROL 8.5 ML: 604.72 INJECTION INTRAVENOUS at 08:05

## 2025-05-20 ENCOUNTER — OFFICE VISIT (OUTPATIENT)
Dept: NEUROSURGERY | Facility: CLINIC | Age: 60
End: 2025-05-20
Payer: MEDICARE

## 2025-05-20 VITALS — HEART RATE: 80 BPM | SYSTOLIC BLOOD PRESSURE: 130 MMHG | DIASTOLIC BLOOD PRESSURE: 88 MMHG

## 2025-05-20 DIAGNOSIS — G93.9 BRAIN LESION: ICD-10-CM

## 2025-05-20 DIAGNOSIS — R42 VERTIGO: Primary | ICD-10-CM

## 2025-05-20 DIAGNOSIS — G43.709 CHRONIC MIGRAINE WITHOUT AURA WITHOUT STATUS MIGRAINOSUS, NOT INTRACTABLE: ICD-10-CM

## 2025-05-20 PROCEDURE — 99215 OFFICE O/P EST HI 40 MIN: CPT | Mod: S$GLB,,, | Performed by: PHYSICIAN ASSISTANT

## 2025-05-20 PROCEDURE — 3075F SYST BP GE 130 - 139MM HG: CPT | Mod: CPTII,S$GLB,, | Performed by: PHYSICIAN ASSISTANT

## 2025-05-20 PROCEDURE — 1159F MED LIST DOCD IN RCRD: CPT | Mod: CPTII,S$GLB,, | Performed by: PHYSICIAN ASSISTANT

## 2025-05-20 PROCEDURE — 99999 PR PBB SHADOW E&M-EST. PATIENT-LVL III: CPT | Mod: PBBFAC,,, | Performed by: PHYSICIAN ASSISTANT

## 2025-05-20 PROCEDURE — 3079F DIAST BP 80-89 MM HG: CPT | Mod: CPTII,S$GLB,, | Performed by: PHYSICIAN ASSISTANT

## 2025-05-20 NOTE — PROGRESS NOTES
Neurosurgery  Established Patient    SUBJECTIVE:     History of Present Illness:  Gil Cueva is a 60 y.o. 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     Patient complains of headaches and vertigo. She reports usual headaches 3-4x/month but reports severe headaches lately that will take her up at night. Headache are at the top of her head and are severe. She worries she is having a stroke. She has taken her BP during a headache and it usually normal. She reports taking excedrin essentially every morning. She reports this almost resolves the headache. She reports  she had taken fioricet and topamax years ago. But reports significant side effects with the topamax.     Reports vertigo mostly when driving over bridges or on an escalator (when sides will drop off). This often leads to an anxiety attack.        Review of patient's allergies indicates:   Allergen Reactions    Penicillins      I have no idea, my mother told me that  My mom said they gave it to me as a baby and it almost killed me    Sulfa (sulfonamide antibiotics)      I have no idea, my mother told me that    My mom said she is allergic to it so I am allergic to it       Current Medications[1]    Past Medical History:   Diagnosis Date    Breast cancer     General anesthetics causing adverse effect in therapeutic use     woke up during ankle surgery    PONV (postoperative nausea and vomiting)      Past Surgical History:   Procedure Laterality Date    ANKLE SURGERY Right     2016    BREAST LUMPECTOMY Left 2008    CERVICAL FUSION N/A 20003    c-3, 4, 5    KNEE ARTHROSCOPY W/ MENISCECTOMY Right 10/24/2023    Procedure: ARTHROSCOPY, KNEE, WITH MENISCECTOMY;  Surgeon: Nicanor Luna MD;  Location: [facility];  Service: Orthopedics;  Laterality: Right;  lateral meniscectomy    ROTATOR CUFF REPAIR Right 2016    Dr. Perez      Family History       Problem Relation (Age of Onset)    Breast cancer Maternal Aunt    Melanoma Maternal Grandfather          Social History     Socioeconomic History    Marital status:     Number of children: 1   Tobacco Use    Smoking status: Never    Smokeless tobacco: Never   Substance and Sexual Activity    Alcohol use: Yes    Drug use: No    Sexual activity: Not Currently     Social Drivers of Health     Financial Resource Strain: Medium Risk (5/13/2025)    Overall Financial Resource Strain (CARDIA)     Difficulty of Paying Living Expenses: Somewhat hard   Food Insecurity: No Food Insecurity (5/13/2025)    Hunger Vital Sign     Worried About Running Out of Food in the Last Year: Never true     Ran Out of Food in the Last Year: Never true   Transportation Needs: No Transportation Needs (5/13/2025)    PRAPARE - Transportation     Lack of Transportation (Medical): No     Lack of Transportation (Non-Medical): No   Physical Activity: Sufficiently Active (5/13/2025)    Exercise Vital Sign     Days of Exercise per Week: 3 days     Minutes of Exercise per Session: 80 min   Stress: Stress Concern Present (5/13/2025)    [employer] - Occupational Stress Questionnaire     Feeling of Stress : Very much   Housing Stability: Low Risk  (5/13/2025)    Housing Stability Vital Sign     Unable to Pay for Housing in the Last Year: No     Number of Times Moved in the Last Year: 0     Homeless in the Last Year: No       Review of Systems    OBJECTIVE:     Vital Signs  Pulse: 80  BP: 130/88  Pain Score:   9  There is no height or weight on file to calculate BMI.    Neurosurgery Physical Exam  General: well developed, well nourished, no distress.   Head: normocephalic, atraumatic  Neurologic: Alert and oriented. Thought content appropriate.  Language: No aphasia  Speech: No dysarthria  Cranial nerves: face symmetric, tongue midline, CN II-XII grossly intact.   Eyes: EOMI.   Pulmonary: normal  respirations, no signs of respiratory distress  Skin: Skin is warm, dry and intact.  Motor Strength:Moves all extremities spontaneously with good tone.  Full strength upper and lower extremities. No abnormal movements seen.   Finger-to-nose: intact bilaterally   Pronator drift: absent bilaterally  Gait stable, fluid.       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      ASSESSMENT/PLAN:      60 y.o. 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 This is stable compared to previous imaging. Reviewed with Dr. Muñoz. No further follow up necessary for pontine capillary telangiectasia. I will refer patient to ENT for vertigo. I will refer patient to headache neurology. I educated patient on excedrin daily use may be causing her rebound headaches. I would like the patient to follow-up in clinic PRN. I have encouraged her to contact the clinic with any questions, concerns, or adverse clinical changes. She verbalized understanding.       Andressa Milner PA-C  Neurosurgery   [facility]      Time spent on this encounter: 49 minutes. This includes face-to-face time and non-face to face time preparing to see the patient (eg, review of tests), obtaining and/or reviewing separately obtained history, documenting clinical information in the electronic or other health record, independently interpreting results and communicating results to the patient/family/caregiver, or care coordinator     Note dictated with voice recognition software, please excuse any grammatical errors.             [1]   Current Outpatient Medications   Medication Sig Dispense Refill    aspirin-acetaminophen-caffeine 250-250-65 mg (EXCEDRIN MIGRAINE) 250-250-65 mg  per tablet Take 1 tablet by mouth every 6 (six) hours as needed for Pain.      ibuprofen (ADVIL,MOTRIN) 200 MG tablet Take 200 mg by mouth every 6 (six) hours as needed for Pain.       No current facility-administered medications for this visit.     Facility-Administered Medications Ordered in Other Visits   Medication Dose Route Frequency Provider Last Rate Last Admin    electrolyte-S (ISOLYTE)   Intravenous Continuous CouRayray james MD 70 mL/hr at 10/24/23 0822 New Bag at 10/24/23 0822    electrolyte-S (ISOLYTE)   Intravenous Continuous CouRayray james MD 10 mL/hr at 10/24/23 0619 New Bag at 10/24/23 0619    LIDOcaine (PF) 10 mg/ml (1%) injection 10 mg  1 mL Intradermal Once Rayray Alejo MD        ondansetron injection 4 mg  4 mg Intravenous Once PRN Rayray Alejo MD

## 2025-05-21 ENCOUNTER — OFFICE VISIT (OUTPATIENT)
Dept: FAMILY MEDICINE | Facility: CLINIC | Age: 60
End: 2025-05-21
Payer: MEDICARE

## 2025-05-21 VITALS
RESPIRATION RATE: 14 BRPM | SYSTOLIC BLOOD PRESSURE: 112 MMHG | DIASTOLIC BLOOD PRESSURE: 77 MMHG | BODY MASS INDEX: 29.41 KG/M2 | HEART RATE: 80 BPM | HEIGHT: 67 IN | OXYGEN SATURATION: 97 % | TEMPERATURE: 99 F | WEIGHT: 187.38 LBS

## 2025-05-21 DIAGNOSIS — G89.29 CHRONIC NONINTRACTABLE HEADACHE, UNSPECIFIED HEADACHE TYPE: ICD-10-CM

## 2025-05-21 DIAGNOSIS — F41.1 GAD (GENERALIZED ANXIETY DISORDER): ICD-10-CM

## 2025-05-21 DIAGNOSIS — E78.5 HYPERLIPIDEMIA, UNSPECIFIED HYPERLIPIDEMIA TYPE: Primary | ICD-10-CM

## 2025-05-21 DIAGNOSIS — G89.29 CHRONIC MUSCULOSKELETAL PAIN: ICD-10-CM

## 2025-05-21 DIAGNOSIS — I70.0 AORTIC ATHEROSCLEROSIS: ICD-10-CM

## 2025-05-21 DIAGNOSIS — M79.18 MYOFASCIAL PAIN SYNDROME OF THORACIC SPINE: ICD-10-CM

## 2025-05-21 DIAGNOSIS — E66.811 OBESITY (BMI 30.0-34.9): ICD-10-CM

## 2025-05-21 DIAGNOSIS — R51.9 CHRONIC NONINTRACTABLE HEADACHE, UNSPECIFIED HEADACHE TYPE: ICD-10-CM

## 2025-05-21 DIAGNOSIS — M79.18 CHRONIC MUSCULOSKELETAL PAIN: ICD-10-CM

## 2025-05-21 DIAGNOSIS — Z85.3 HISTORY OF BREAST CANCER: ICD-10-CM

## 2025-05-21 PROCEDURE — 99999 PR PBB SHADOW E&M-EST. PATIENT-LVL III: CPT | Mod: PBBFAC,,, | Performed by: FAMILY MEDICINE

## 2025-05-21 PROCEDURE — 1159F MED LIST DOCD IN RCRD: CPT | Mod: CPTII,S$GLB,, | Performed by: FAMILY MEDICINE

## 2025-05-21 PROCEDURE — 1160F RVW MEDS BY RX/DR IN RCRD: CPT | Mod: CPTII,S$GLB,, | Performed by: FAMILY MEDICINE

## 2025-05-21 PROCEDURE — 3078F DIAST BP <80 MM HG: CPT | Mod: CPTII,S$GLB,, | Performed by: FAMILY MEDICINE

## 2025-05-21 PROCEDURE — G2211 COMPLEX E/M VISIT ADD ON: HCPCS | Mod: S$GLB,,, | Performed by: FAMILY MEDICINE

## 2025-05-21 PROCEDURE — 3008F BODY MASS INDEX DOCD: CPT | Mod: CPTII,S$GLB,, | Performed by: FAMILY MEDICINE

## 2025-05-21 PROCEDURE — 3074F SYST BP LT 130 MM HG: CPT | Mod: CPTII,S$GLB,, | Performed by: FAMILY MEDICINE

## 2025-05-21 PROCEDURE — 99214 OFFICE O/P EST MOD 30 MIN: CPT | Mod: S$GLB,,, | Performed by: FAMILY MEDICINE

## 2025-05-21 RX ORDER — LORAZEPAM 1 MG/1
1 TABLET ORAL EVERY 8 HOURS PRN
Qty: 10 TABLET | Refills: 0 | Status: SHIPPED | OUTPATIENT
Start: 2025-05-21 | End: 2025-06-20

## 2025-05-21 RX ORDER — DULOXETIN HYDROCHLORIDE 30 MG/1
30 CAPSULE, DELAYED RELEASE ORAL DAILY
Qty: 30 CAPSULE | Refills: 5 | Status: SHIPPED | OUTPATIENT
Start: 2025-05-21 | End: 2026-05-21

## 2025-05-21 NOTE — PROGRESS NOTES
"Subjective:       Patient ID: Gil Mariscal is a 60 y.o. female.    Chief Complaint: Follow-up (6mth f/u)    Problem List[1]  Patient is here for a chronic conditions follow up.    Reviewed labs 8/2024   History of Present Illness    CHIEF COMPLAINT:  Ms. Mariscal presents today for follow up of back pain and ongoing headaches.    BACK PAIN:  She reports persistent back pain without improvement, stating she is learning to tolerate it. Previous interventions including trigger point injections (in ears, feet, and forehead) and physical therapy were ineffective in managing her symptoms.    HEADACHES AND MIGRAINES:  She experiences two distinct types of headaches: migraines and severe nighttime headaches. The nighttime headaches are described as severe enough to raise stroke concerns. She currently manages headaches with Excedrin, taking two tablets daily upon waking.    OCULAR SYMPTOMS:  She reports intermittent vision loss in both eyes while watching television, occurring spontaneously without identifiable triggers. She was diagnosed with possible episcleritis by another provider. The condition presents with prodromal symptoms including fever-like sensations and eye stickiness, followed by eye redness the next day.    ANXIETY:  She experiences vertigo when crossing bridges and startles easily. She reports using her son's Lorazepam for anxiety while driving, which alleviated her symptoms.    SLEEP:  She experiences nightmares during deep sleep and reports being able to wake herself when recognizing entry into deep sleep. She also reports bruxism and uses a dental brace for management.    MEDICATIONS:  She has muscle relaxers with 97 refills remaining, having only used 3-4 refills. She expresses being "anti-med" and reports a previous adverse reaction to an SSRI prescribed for headaches approximately 20 years ago, which caused hallucinations and "crazy thoughts." She expresses concern about potential weight gain from " "psychiatric medications.      ROS:  ROS findings as noted in HPI.        Review of Systems   Constitutional:  Negative for fatigue and unexpected weight change.   Respiratory:  Negative for chest tightness and shortness of breath.    Cardiovascular:  Negative for chest pain, palpitations and leg swelling.   Gastrointestinal:  Negative for abdominal pain.   Musculoskeletal:  Negative for arthralgias.   Neurological:  Negative for dizziness, syncope, light-headedness and headaches.      Relevant History:  Typically declines vaccines    Phys med Dr. Helms treating myofascial pain syndrome of t-spine.   C/o severe chronic pain lower thoracic paraspinal area started after walking a 1 mile run 9/23 with hurt right knee.   Dr. Helms did trigger point injections which has helped 20%.  MRI ls spine and T spine 2024 showed multi level DDD without high grade stenosis. Tried acupuncture 12/2024      Ortho Dr. Luna right knee osteochondral lesion/meniscal tear s/p surgery 10/23. Now having low back pain radiates to right hip and spasms. Back exercises done at gym and not helping. Taking meloxicam. Did PT 2024        Psych Mood-Self weaned xanax due to vertigo and worsening panic attacks. DPS checked -last rx filled 2/24. Didn't want to be on addicting drugs and didn't feel like it was working anymore. Reporting that "she doesn't give a shit about anything anymore".  Hesitant to take anti depressants.  Lots of worry and stress about adult son who has high functioning autism.  Willing to talk to counselor  Chronic insomnia and ADIA controlled with xanax .5 mg-takes 2 at bedtime and non during day. Has been filling monthly and has a surplus     Derm Dr. Van MACHADO     NS Dr. Muñoz brain lesion. MRI (tumor with perfusion) and spectroscopy. This is a patient who has had recent vertigo that comes on with anxiety.  She has a history of breast cancer more than 12 years ago but has been disease-free since. An MRI was ordered " "revealing 2 very small areas of enhancement in the james that were concerning for possible metastases. At the time of our last clinic visit on 4/11/2023, the pt is here to discuss her PET scan results, which show no evidence of hypermetabolic tumor. ? Congenital lesion. 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  Punctate separate focus susceptibility in the right mid james, which may represent sequela of remote microhemorrhage.  No surrounding edema or enhancement.     Neuro Dr. Rubio Has h/o migraines. Has been having more migraines last 1 month.  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 Relieved with IB      Spine Dr. Travis treating neck and upper back pain. Cervical ddd s/p fusion 2003  Phys med Dr. Helms treating myofascial pain syndrome     Onc Dr. Carter h/o left breast ca 2008 lumpectomy. Mammo neg 3/22 . Mammo 4/24 neg. PET scan 4/23 In this patient with history of left breast cancer post lumpectomy and brain metastasis, there is no evidence of hypermetabolic tumor.      Card Dr. Borwn-has seen for palpitations -ended up related to anxiety.  High stress at  Home. 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 Under care of Dr. William.  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  Extremely resistant to taking anti-depressant meds. Intolerant to several which made her worse  HPL-Your cholesterol is high.           GYN Dr. Alvarado PAP utd- 2023 pap neg, HPV HR neg       Eye Dr. MYNOR Cifuentes/Satish- ?  Episcleritis. treats occ red eye and eyelid irritation with gooey d/c with rare limited use of prednisolone eye drops. Use 3-4 times per year for 2-5 days. Usually left eye.      GI colonoscopy 2015 + polyps. [facility]commended 5 " [facility]  Objective:      Physical Exam  Vitals and nursing note reviewed.   Constitutional:       Appearance: She is well-developed.   Cardiovascular:      Rate and Rhythm: Normal rate and regular rhythm.      Heart sounds: Normal heart sounds.   Pulmonary:      Effort: Pulmonary effort is normal.      Breath sounds: Normal breath sounds.   Skin:     General: Skin is warm and dry.   Neurological:      Mental Status: She is alert and oriented to person, place, and time.         Assessment:       ICD-10-CM ICD-9-CM    1. Hyperlipidemia, unspecified hyperlipidemia type  E78.5 272.4 CBC Auto Differential      Comprehensive Metabolic Panel      Lipid Panel      2. Aortic atherosclerosis  I70.0 440.0       3. History of breast cancer  Z85.3 V10.3       4. Obesity (BMI 30.0-34.9)  E66.811 278.00       5. Myofascial pain syndrome of thoracic spine  M79.18 729.1       6. ADIA (generalized anxiety disorder)  F41.1 300.02 DULoxetine (CYMBALTA) 30 MG capsule      LORazepam (ATIVAN) 1 MG tablet      7. Chronic musculoskeletal pain  M79.18 729.1 Sedimentation rate    G89.29 338.29 C-Reactive Protein      RICO Screen w/Reflex      Rheumatoid Factor      8. Chronic nonintractable headache, unspecified headache type  R51.9 784.0     G89.29           Plan:   1. Hyperlipidemia, unspecified hyperlipidemia type (Primary)  I recommend a heart healthy diet rich in fiber, fresh vegetables and fruit and low in saturated fats (fried foods, red meat, etc.).  I also recommend regular exercise including a minimum of 150 minutes of cardio exercise per week and 2-30 minute workouts of strength training like light weights, yoga, pilates, etc.  You should work toward a body mass index of < 25.      - CBC Auto Differential; Future  - Comprehensive Metabolic Panel; Future  - Lipid Panel; Future    2. Aortic atherosclerosis  Cont to lower risk factors    3. History of breast cancer  Cont onc monitoring and yearly  "mammograms    4. Obesity (BMI 30.0-34.9)  Counseled patient on his ideal body weight, health consequences of being obese and current recommendations including weekly exercise and a heart healthy diet.  Current BMI is:Estimated body mass index is 29.35 kg/m² as calculated from the following:    Height as of this encounter: 5' 7" (1.702 m).    Weight as of this encounter: 85 kg (187 lb 6.3 oz)..  Patient is aware that ideal BMI < 25 or Weight in (lb) to have BMI = 25: 159.3.      5. Myofascial pain syndrome of thoracic spine  Unimproved with trigger points, acupuncture, PT    6. ADIA (generalized anxiety disorder)  Resistant to taking meds.  Add  - DULoxetine (CYMBALTA) 30 MG capsule; Take 1 capsule (30 mg total) by mouth once daily.  Dispense: 30 capsule; Refill: 5  Use prn  - LORazepam (ATIVAN) 1 MG tablet; Take 1 tablet (1 mg total) by mouth every 8 (eight) hours as needed for Anxiety.  Dispense: 10 tablet; Refill: 0    7. Chronic musculoskeletal pain  Screen and treat as indicated:    - Sedimentation rate; Future  - C-Reactive Protein; Future  - RICO Screen w/Reflex; Future  - Rheumatoid Factor; Future      8. Chronic nonintractable headache, unspecified headache type  Suspect may be related to ddd c and t spine, teeth grinding, rebound headaches, uncontrolled THOMAS, stress headaches, migraines or combination of these.  Cont NS monitoring and care. Cont use of mouthguard and use of muscle relaxer at bedtime.  HST ordered. Avoid use of same HA relief meds more than 2 x week to reduce risk of rebound.  May be candidate for botox?  F/u 1 month VV      Assessment & Plan    - Explained the concept of tension headaches originating from neck and shoulder tension.  - Ms. Mariscal to consider taking muscle relaxer at bedtime to interrupt tension headache cycle.  - Continued muscle relaxer with instruction to consider taking at bedtime.  - Discussed sleep paralysis and its symptoms.  - Ordered home sleep study to screen for " sleep apnea.  - Provided information on rebound headaches from frequent use of Excedrin Migraine, explaining the mechanism of medication rebound effects, comparing it to Afrin nasal spray dependency.  - Ms. Mariscal to limit Excedrin migraine use to 2 times per week or less to avoid rebound headaches.  - Recommend alternating pain medications (e.g. ibuprofen) if daily use is needed.  - Started Cymbalta (duloxetine) 30 mg daily for 1 month for management of anxiety, muscular pain, and headaches.  - Prescribed lorazepam for occasional use as needed for anxiety.  - Ordered autoimmune screening labs in 6 months: RICO, rheumatoid factor, sed rate, CRP.       Time spent with patient: 20 minutes  Patient with be reevaluated in 6 months or sooner prn  Greater than 50% of this visit was spent counseling as described in above documentation:Yes  This note was generated with the assistance of ambient listening technology. Verbal consent was obtained by the patient and accompanying visitor(s) for the recording of patient appointment to facilitate this note. I attest to having reviewed and edited the generated note for accuracy, though some syntax or spelling errors may persist. Please contact the author of this note for any clarification.            [1]   Patient Active Problem List  Diagnosis    Arthritis    Osteoarthritis    History of breast cancer    DDD (degenerative disc disease), cervical s/p fusion 2003    Syncope    Arthralgia    Insomnia    ADIA (generalized anxiety disorder)    Obesity (BMI 30.0-34.9)    Hyperlipidemia    B12 deficiency    Vertigo    Aortic atherosclerosis    Brain lesion    Pain in thoracic spine    Myofascial pain syndrome of thoracic spine    Malignant neoplasm of central portion of left breast in female, estrogen receptor positive

## 2025-05-22 PROBLEM — F32.1 MAJOR DEPRESSIVE DISORDER, SINGLE EPISODE, MODERATE: Status: RESOLVED | Noted: 2024-08-08 | Resolved: 2025-05-22

## 2025-06-06 ENCOUNTER — TELEPHONE (OUTPATIENT)
Dept: NEUROLOGY | Facility: CLINIC | Age: 60
End: 2025-06-06
Payer: MEDICARE

## 2025-06-10 ENCOUNTER — OFFICE VISIT (OUTPATIENT)
Dept: OPHTHALMOLOGY | Facility: CLINIC | Age: 60
End: 2025-06-10
Payer: MEDICARE

## 2025-06-10 DIAGNOSIS — H04.123 DRY EYE SYNDROME, BILATERAL: ICD-10-CM

## 2025-06-10 DIAGNOSIS — H57.12 PAIN AROUND LEFT EYE: ICD-10-CM

## 2025-06-10 DIAGNOSIS — H52.03 HYPEROPIA OF BOTH EYES: Primary | ICD-10-CM

## 2025-06-10 PROCEDURE — 1160F RVW MEDS BY RX/DR IN RCRD: CPT | Mod: CPTII,S$GLB,, | Performed by: OPHTHALMOLOGY

## 2025-06-10 PROCEDURE — 92015 DETERMINE REFRACTIVE STATE: CPT | Mod: S$GLB,,, | Performed by: OPHTHALMOLOGY

## 2025-06-10 PROCEDURE — 92014 COMPRE OPH EXAM EST PT 1/>: CPT | Mod: S$GLB,,, | Performed by: OPHTHALMOLOGY

## 2025-06-10 PROCEDURE — 99999 PR PBB SHADOW E&M-EST. PATIENT-LVL III: CPT | Mod: PBBFAC,,, | Performed by: OPHTHALMOLOGY

## 2025-06-10 PROCEDURE — 1159F MED LIST DOCD IN RCRD: CPT | Mod: CPTII,S$GLB,, | Performed by: OPHTHALMOLOGY

## 2025-06-10 RX ORDER — KETOROLAC TROMETHAMINE 5 MG/ML
1 SOLUTION OPHTHALMIC 3 TIMES DAILY PRN
Qty: 5 ML | Refills: 2 | Status: SHIPPED | OUTPATIENT
Start: 2025-06-10

## 2025-06-10 NOTE — PROGRESS NOTES
HPI    Pt  has been having shooting pain inOS. States started about 2 mths   ago. And went to see another eye dr and was told it was episcleritis OS.    was prescribed keto OS TID x 1 week. And pain stopped. States   happened once again after that. Has not had symptoms since a month  ago.    will be seeing PCP soon to do BW for RA     States no other complaints.     Denies pain in eyes today. Denies FOL/ floaters.     Optase OU PRN   Last edited by Charley Vegas on 6/10/2025  1:40 PM.            Assessment /Plan     For exam results, see Encounter Report.    Hyperopia of both eyes    Dry eye syndrome, bilateral    Pain around left eye      1. Hyperopia of both eyes (Primary)  New glasses Rx today    2. Dry eye syndrome, bilateral  Recommend ATs up to QID PRN    3. Pain around left eye  Has resolved  Based on description, I suspect migraine related  Pt reassured    4. Episcleritis OS  Quiet now  Has recurrent episodes  Ketorolac TID up to 1 week PRN    F/u 1 year, routine exam

## 2025-06-20 ENCOUNTER — OFFICE VISIT (OUTPATIENT)
Dept: FAMILY MEDICINE | Facility: CLINIC | Age: 60
End: 2025-06-20
Payer: MEDICARE

## 2025-06-20 DIAGNOSIS — F41.1 GAD (GENERALIZED ANXIETY DISORDER): ICD-10-CM

## 2025-06-20 RX ORDER — DULOXETIN HYDROCHLORIDE 60 MG/1
60 CAPSULE, DELAYED RELEASE ORAL DAILY
Qty: 30 CAPSULE | Refills: 5 | Status: SHIPPED | OUTPATIENT
Start: 2025-06-20 | End: 2026-06-20

## 2025-06-20 NOTE — PROGRESS NOTES
Subjective:       Patient ID: Gil Cueva is a 60 y.o. female.    Chief Complaint: No chief complaint on file.    HPI  Review of Systems   Constitutional:  Negative for fatigue and unexpected weight change.   Respiratory:  Negative for chest tightness and shortness of breath.    Cardiovascular:  Negative for chest pain, palpitations and leg swelling.   Gastrointestinal:  Negative for abdominal pain.   Musculoskeletal:  Negative for arthralgias.   Neurological:  Negative for dizziness, syncope, light-headedness and headaches.       Problem List[1]  Patient is here for telemedicine visit  The patient location is: [place]  The chief complaint leading to consultation is: f/u  Visit type: Virtual visit with synchronous audio and video  Total time spent with patient: 10-20 min  Each patient to whom he or she provides medical services by telemedicine is:  (1) informed of the relationship between the physician and patient and the respective role of any other health care provider with respect to management of the patient; and (2) notified that he or she may decline to receive medical services by telemedicine and may withdraw from such care at any time.    Notes: see below   AUDIO VISIT ONLY? no  Here to f/u mood after starting cymbalta 30mg. Tolerating it well and seeing some slight improvement in anxiety. Would like to increase to 60mg       Typically declines vaccines     Phys med Dr. Helms treating myofascial pain syndrome of t-spine.   C/o severe chronic pain lower thoracic paraspinal area started after walking a 1 mile run 9/23 with hurt right knee.   Dr. Helms did trigger point injections which has helped 20%.  MRI ls spine and T spine 2024 showed multi level DDD without high grade stenosis. Tried acupuncture 12/2024      Ortho Dr. Luna right knee osteochondral lesion/meniscal tear s/p surgery 10/23. Now having low back pain radiates to right hip and spasms. Back exercises done at gym and not helping.  "Taking meloxicam. Did PT 2024        Psych Mood-Self weaned xanax due to vertigo and worsening panic attacks. DPS checked -last rx filled 2/24. Didn't want to be on addicting drugs and didn't feel like it was working anymore. Reporting that "she doesn't give a shit about anything anymore".  Hesitant to take anti depressants.  Lots of worry and stress about adult son who has high functioning autism.  Willing to talk to counselor  Chronic insomnia and ADIA controlled with xanax .5 mg-takes 2 at bedtime and non during day. Has been filling monthly and has a surplus     Derm Dr. Van MACHADO     NS Dr. Muñoz brain lesion. MRI (tumor with perfusion) and spectroscopy. This is a patient who has had recent vertigo that comes on with anxiety.  She has a history of breast cancer more than 12 years ago but has been disease-free since. An MRI was ordered revealing 2 very small areas of enhancement in the james that were concerning for possible metastases. At the time of our last clinic visit on 4/11/2023, the pt is here to discuss her PET scan results, which show no evidence of hypermetabolic tumor. ? Congenital lesion. 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  Punctate separate focus susceptibility in the right mid james, which may represent sequela of remote microhemorrhage.  No surrounding edema or enhancement.     Neuro Dr. Rubio Has h/o migraines. Has been having more migraines last 1 month.  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 Relieved with IB      Spine Dr. Travis treating neck and upper back pain. Cervical ddd s/p fusion 2003  Phys med Dr. Helms treating myofascial pain syndrome     Onc Dr. Carter h/o left breast ca 2008 lumpectomy. Mammo neg 3/22 . Mammo 4/24 neg. PET scan 4/23 " "In this patient with history of left breast cancer post lumpectomy and brain metastasis, there is no evidence of hypermetabolic tumor.      Card Dr. Brown-has seen for palpitations -ended up related to anxiety.  High stress at  Home. 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 Under care of Dr. William.  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  Extremely resistant to taking anti-depressant meds. Intolerant to several which made her worse  HPL-Your cholesterol is high.           GYN Dr. Alvarado PAP utd- 2023 pap neg, HPV HR neg       Eye Dr. MYNOR Cifuentes/Satish- ?  Episcleritis. treats occ red eye and eyelid irritation with gooey d/c with rare limited use of prednisolone eye drops. Use 3-4 times per year for 2-5 days. Usually left eye.      GI colonoscopy 2015 + polyps. Recommended 5 year surveillance. Cologuard 2/24 neg  Objective:      Physical Exam  Constitutional:       Appearance: Normal appearance.   Cardiovascular:      Pulses: Normal pulses.   Pulmonary:      Effort: Pulmonary effort is normal.   Neurological:      General: No focal deficit present.      Mental Status: She is alert.   Psychiatric:         Mood and Affect: Mood normal.         Thought Content: Thought content normal.         Assessment:       1. ADIA (generalized anxiety disorder)        Plan:         1. ADIA (generalized anxiety disorder)  Increase to  - DULoxetine (CYMBALTA) 60 MG capsule; Take 1 capsule (60 mg total) by mouth once daily.  Dispense: 30 capsule; Refill: 5      Time spent with patient: 20 minutes    Patient with be reevaluated in 6 months or sooner prn    Greater than 50% of this visit was spent counseling as described in above documentation:Yes         [1]   Patient Active Problem List  Diagnosis    Arthritis    Osteoarthritis    History of breast cancer    DDD (degenerative disc disease), cervical s/p fusion 2003    Syncope    Arthralgia    Insomnia    ADIA (generalized " anxiety disorder)    Obesity (BMI 30.0-34.9)    Hyperlipidemia    B12 deficiency    Vertigo    Aortic atherosclerosis    Brain lesion    Pain in thoracic spine    Myofascial pain syndrome of thoracic spine    Malignant neoplasm of central portion of left breast in female, estrogen receptor positive

## 2025-06-24 ENCOUNTER — TELEPHONE (OUTPATIENT)
Dept: FAMILY MEDICINE | Facility: CLINIC | Age: 60
End: 2025-06-24
Payer: MEDICARE

## 2025-06-24 ENCOUNTER — PATIENT MESSAGE (OUTPATIENT)
Dept: FAMILY MEDICINE | Facility: CLINIC | Age: 60
End: 2025-06-24
Payer: MEDICARE

## 2025-06-24 DIAGNOSIS — F41.1 GAD (GENERALIZED ANXIETY DISORDER): ICD-10-CM

## 2025-06-24 RX ORDER — DULOXETIN HYDROCHLORIDE 30 MG/1
30 CAPSULE, DELAYED RELEASE ORAL DAILY
Qty: 30 CAPSULE | Refills: 5 | Status: SHIPPED | OUTPATIENT
Start: 2025-06-24 | End: 2026-06-24

## 2025-07-14 DIAGNOSIS — F41.1 GAD (GENERALIZED ANXIETY DISORDER): ICD-10-CM

## 2025-07-14 RX ORDER — LORAZEPAM 1 MG/1
1 TABLET ORAL EVERY 8 HOURS PRN
Qty: 10 TABLET | Refills: 0 | Status: SHIPPED | OUTPATIENT
Start: 2025-07-14

## 2025-07-14 NOTE — TELEPHONE ENCOUNTER
No care due was identified.  [facility] Embedded Care Due Messages. Reference number: [id number].   7/14/2025 4:54:45 PM CDT

## (undated) DEVICE — DRAPE U SPLIT SHEET 54X76IN

## (undated) DEVICE — UNDERGLOVES BIOGEL PI SIZE 8

## (undated) DEVICE — SLEEVE SCD EXPRESS KNEE MEDIUM

## (undated) DEVICE — BANDAGE ESMARK ELASTIC ST 6X9

## (undated) DEVICE — APPLICATOR CHLORAPREP ORN 26ML

## (undated) DEVICE — UNDERGLOVES BIOGEL PI SIZE 7.5

## (undated) DEVICE — SPONGE BULKEE II ABSRB 6X6.75

## (undated) DEVICE — WRAP KNEE ACCU THERM GEL PACK

## (undated) DEVICE — SOL NACL IRR 3000ML

## (undated) DEVICE — PADDING WYTEX UNDRCST 6INX4YD

## (undated) DEVICE — DRAPE EXTREMITY ORTHOMAX

## (undated) DEVICE — CUTTER AGGRESSIVE PLUS 3.5MM

## (undated) DEVICE — SUT ETHILON 3-0 PS2 18 BLK

## (undated) DEVICE — GOWN POLY REINF X-LONG XL

## (undated) DEVICE — GOWN POLY REINF BRTH SLV XL

## (undated) DEVICE — COVER SURG LIGHT HANDLE

## (undated) DEVICE — PACK SET UP 190 OMC-NS

## (undated) DEVICE — PAD ABDOMINAL STERILE 8X10IN

## (undated) DEVICE — SYR LUER LOCK STERILE 10ML

## (undated) DEVICE — DRAPE STERI INSTRUMENT 1018

## (undated) DEVICE — MANIFOLD 4 PORT

## (undated) DEVICE — GLOVE SURG ULTRA TOUCH 7.5

## (undated) DEVICE — NDL SAFETY 21G X 1 1/2 ECLPSE

## (undated) DEVICE — MAT QUICK 40X30 FLOOR FLUID LF

## (undated) DEVICE — DRAPE STERI U-SHAPED 47X51IN

## (undated) DEVICE — ALCOHOL 70% ANTISEPTIC ISO 4OZ

## (undated) DEVICE — PACK SIRUS BASIC V SURG STRL

## (undated) DEVICE — BLADE SURG CARBON STEEL SZ11

## (undated) DEVICE — TUBING SUC UNIV W/CONN 12FT

## (undated) DEVICE — DRESSING N ADH OIL EMUL 3X3

## (undated) DEVICE — PADDING CAST SPECIALIST 6X4YD

## (undated) DEVICE — TOURNIQUET SB QC DP 34X4IN

## (undated) DEVICE — BANDAGE MATRIX HK LOOP 6IN 5YD

## (undated) DEVICE — TUBING ARTHROSCOPY

## (undated) DEVICE — SKINMARKER W/RULER DEVON

## (undated) DEVICE — NDL SPINAL 18GX3.5 SPINOCAN

## (undated) DEVICE — GLOVE SURG ULTRA TOUCH 8

## (undated) DEVICE — SOCKINETTE IMPERVIOUS 12X48IN

## (undated) DEVICE — TOWEL OR DISP STRL BLUE 4/PK

## (undated) DEVICE — BNDG COFLEX FOAM LF2 ST 6X5YD

## (undated) DEVICE — STRAP OR TABLE 5IN X 72IN